# Patient Record
Sex: FEMALE | Race: WHITE | Employment: OTHER | URBAN - METROPOLITAN AREA
[De-identification: names, ages, dates, MRNs, and addresses within clinical notes are randomized per-mention and may not be internally consistent; named-entity substitution may affect disease eponyms.]

---

## 2021-10-15 ENCOUNTER — APPOINTMENT (OUTPATIENT)
Dept: MRI IMAGING | Age: 71
DRG: 064 | End: 2021-10-15
Attending: FAMILY MEDICINE
Payer: MEDICARE

## 2021-10-15 ENCOUNTER — APPOINTMENT (OUTPATIENT)
Dept: CT IMAGING | Age: 71
End: 2021-10-15
Attending: EMERGENCY MEDICINE
Payer: MEDICARE

## 2021-10-15 ENCOUNTER — HOSPITAL ENCOUNTER (EMERGENCY)
Age: 71
Discharge: ANOTHER HOSPITAL | End: 2021-10-15
Attending: EMERGENCY MEDICINE
Payer: MEDICARE

## 2021-10-15 ENCOUNTER — APPOINTMENT (OUTPATIENT)
Dept: CT IMAGING | Age: 71
DRG: 064 | End: 2021-10-15
Attending: EMERGENCY MEDICINE
Payer: MEDICARE

## 2021-10-15 ENCOUNTER — HOSPITAL ENCOUNTER (INPATIENT)
Age: 71
LOS: 10 days | Discharge: NURSING, REHAB OR LONG-TERM CARE | DRG: 064 | End: 2021-10-25
Attending: EMERGENCY MEDICINE | Admitting: FAMILY MEDICINE
Payer: MEDICARE

## 2021-10-15 VITALS
OXYGEN SATURATION: 99 % | RESPIRATION RATE: 17 BRPM | SYSTOLIC BLOOD PRESSURE: 153 MMHG | DIASTOLIC BLOOD PRESSURE: 66 MMHG | BODY MASS INDEX: 41.7 KG/M2 | WEIGHT: 265.65 LBS | TEMPERATURE: 98.2 F | HEIGHT: 67 IN | HEART RATE: 67 BPM

## 2021-10-15 DIAGNOSIS — I63.9 CEREBROVASCULAR ACCIDENT (CVA), UNSPECIFIED MECHANISM (HCC): Primary | ICD-10-CM

## 2021-10-15 DIAGNOSIS — R47.01 APHASIA: ICD-10-CM

## 2021-10-15 DIAGNOSIS — R29.898 WEAKNESS OF RIGHT LOWER EXTREMITY: ICD-10-CM

## 2021-10-15 DIAGNOSIS — M62.81 MUSCLE WEAKNESS OF RIGHT UPPER EXTREMITY: ICD-10-CM

## 2021-10-15 DIAGNOSIS — I63.512 ACUTE STROKE DUE TO OCCLUSION OF LEFT MIDDLE CEREBRAL ARTERY (HCC): Primary | ICD-10-CM

## 2021-10-15 LAB
ALBUMIN SERPL-MCNC: 3.8 G/DL (ref 3.5–5)
ALBUMIN/GLOB SERPL: 1.2 {RATIO} (ref 1.1–2.2)
ALP SERPL-CCNC: 86 U/L (ref 45–117)
ALT SERPL-CCNC: 22 U/L (ref 12–78)
ANION GAP SERPL CALC-SCNC: 3 MMOL/L (ref 5–15)
AST SERPL-CCNC: 17 U/L (ref 15–37)
BASOPHILS # BLD: 0.1 K/UL (ref 0–0.1)
BASOPHILS NFR BLD: 1 % (ref 0–1)
BILIRUB SERPL-MCNC: 0.5 MG/DL (ref 0.2–1)
BUN SERPL-MCNC: 30 MG/DL (ref 6–20)
BUN/CREAT SERPL: 42 (ref 12–20)
CALCIUM SERPL-MCNC: 8.8 MG/DL (ref 8.5–10.1)
CHLORIDE SERPL-SCNC: 104 MMOL/L (ref 97–108)
CK SERPL-CCNC: 43 U/L (ref 26–192)
CO2 SERPL-SCNC: 36 MMOL/L (ref 21–32)
COMMENT, HOLDF: NORMAL
COMMENT, HOLDF: NORMAL
COVID-19 RAPID TEST, COVR: NOT DETECTED
CREAT SERPL-MCNC: 0.72 MG/DL (ref 0.55–1.02)
DIFFERENTIAL METHOD BLD: NORMAL
EOSINOPHIL # BLD: 0.1 K/UL (ref 0–0.4)
EOSINOPHIL NFR BLD: 2 % (ref 0–7)
ERYTHROCYTE [DISTWIDTH] IN BLOOD BY AUTOMATED COUNT: 13.5 % (ref 11.5–14.5)
GLOBULIN SER CALC-MCNC: 3.3 G/DL (ref 2–4)
GLUCOSE BLD STRIP.AUTO-MCNC: 109 MG/DL (ref 65–117)
GLUCOSE BLD STRIP.AUTO-MCNC: 112 MG/DL (ref 65–117)
GLUCOSE SERPL-MCNC: 128 MG/DL (ref 65–100)
HCT VFR BLD AUTO: 40.5 % (ref 35–47)
HGB BLD-MCNC: 13 G/DL (ref 11.5–16)
IMM GRANULOCYTES # BLD AUTO: 0 K/UL (ref 0–0.04)
IMM GRANULOCYTES NFR BLD AUTO: 0 % (ref 0–0.5)
INR PPP: 1.1 (ref 0.9–1.1)
LYMPHOCYTES # BLD: 1.9 K/UL (ref 0.8–3.5)
LYMPHOCYTES NFR BLD: 26 % (ref 12–49)
MCH RBC QN AUTO: 30.6 PG (ref 26–34)
MCHC RBC AUTO-ENTMCNC: 32.1 G/DL (ref 30–36.5)
MCV RBC AUTO: 95.3 FL (ref 80–99)
MONOCYTES # BLD: 0.5 K/UL (ref 0–1)
MONOCYTES NFR BLD: 7 % (ref 5–13)
NEUTS SEG # BLD: 4.7 K/UL (ref 1.8–8)
NEUTS SEG NFR BLD: 64 % (ref 32–75)
NRBC # BLD: 0 K/UL (ref 0–0.01)
NRBC BLD-RTO: 0 PER 100 WBC
PLATELET # BLD AUTO: 270 K/UL (ref 150–400)
PMV BLD AUTO: 9.8 FL (ref 8.9–12.9)
POTASSIUM SERPL-SCNC: 3.7 MMOL/L (ref 3.5–5.1)
PROT SERPL-MCNC: 7.1 G/DL (ref 6.4–8.2)
PROTHROMBIN TIME: 10.6 SEC (ref 9–11.1)
RBC # BLD AUTO: 4.25 M/UL (ref 3.8–5.2)
SAMPLES BEING HELD,HOLD: NORMAL
SAMPLES BEING HELD,HOLD: NORMAL
SERVICE CMNT-IMP: NORMAL
SERVICE CMNT-IMP: NORMAL
SODIUM SERPL-SCNC: 143 MMOL/L (ref 136–145)
SOURCE, COVRS: NORMAL
TROPONIN-HIGH SENSITIVITY: 10 NG/L (ref 0–51)
TROPONIN-HIGH SENSITIVITY: 8 NG/L (ref 0–51)
WBC # BLD AUTO: 7.3 K/UL (ref 3.6–11)

## 2021-10-15 PROCEDURE — 65660000000 HC RM CCU STEPDOWN

## 2021-10-15 PROCEDURE — 80053 COMPREHEN METABOLIC PANEL: CPT

## 2021-10-15 PROCEDURE — 74011250637 HC RX REV CODE- 250/637: Performed by: EMERGENCY MEDICINE

## 2021-10-15 PROCEDURE — 84484 ASSAY OF TROPONIN QUANT: CPT

## 2021-10-15 PROCEDURE — 74011000636 HC RX REV CODE- 636: Performed by: EMERGENCY MEDICINE

## 2021-10-15 PROCEDURE — 36415 COLL VENOUS BLD VENIPUNCTURE: CPT

## 2021-10-15 PROCEDURE — 70450 CT HEAD/BRAIN W/O DYE: CPT

## 2021-10-15 PROCEDURE — 85610 PROTHROMBIN TIME: CPT

## 2021-10-15 PROCEDURE — 82962 GLUCOSE BLOOD TEST: CPT

## 2021-10-15 PROCEDURE — 74011000250 HC RX REV CODE- 250: Performed by: FAMILY MEDICINE

## 2021-10-15 PROCEDURE — 87635 SARS-COV-2 COVID-19 AMP PRB: CPT

## 2021-10-15 PROCEDURE — 4A03X5D MEASUREMENT OF ARTERIAL FLOW, INTRACRANIAL, EXTERNAL APPROACH: ICD-10-PCS | Performed by: RADIOLOGY

## 2021-10-15 PROCEDURE — 93005 ELECTROCARDIOGRAM TRACING: CPT

## 2021-10-15 PROCEDURE — 99285 EMERGENCY DEPT VISIT HI MDM: CPT

## 2021-10-15 PROCEDURE — 74011250636 HC RX REV CODE- 250/636: Performed by: EMERGENCY MEDICINE

## 2021-10-15 PROCEDURE — 70498 CT ANGIOGRAPHY NECK: CPT

## 2021-10-15 PROCEDURE — 0042T CT CODE NEURO PERF W CBF: CPT

## 2021-10-15 PROCEDURE — 85025 COMPLETE CBC W/AUTO DIFF WBC: CPT

## 2021-10-15 PROCEDURE — 74011250636 HC RX REV CODE- 250/636: Performed by: FAMILY MEDICINE

## 2021-10-15 PROCEDURE — 70551 MRI BRAIN STEM W/O DYE: CPT

## 2021-10-15 PROCEDURE — 82550 ASSAY OF CK (CPK): CPT

## 2021-10-15 RX ORDER — CLOPIDOGREL BISULFATE 75 MG/1
75 TABLET ORAL DAILY
Status: DISCONTINUED | OUTPATIENT
Start: 2021-10-16 | End: 2021-10-20

## 2021-10-15 RX ORDER — CHOLECALCIFEROL (VITAMIN D3) 50 MCG
CAPSULE ORAL DAILY
COMMUNITY

## 2021-10-15 RX ORDER — SODIUM CHLORIDE 9 MG/ML
75 INJECTION, SOLUTION INTRAVENOUS CONTINUOUS
Status: DISPENSED | OUTPATIENT
Start: 2021-10-15 | End: 2021-10-16

## 2021-10-15 RX ORDER — ASPIRIN 81 MG/1
81 TABLET ORAL DAILY
Status: DISCONTINUED | OUTPATIENT
Start: 2021-10-16 | End: 2021-10-25 | Stop reason: HOSPADM

## 2021-10-15 RX ORDER — GUAIFENESIN 100 MG/5ML
325 LIQUID (ML) ORAL
Status: COMPLETED | OUTPATIENT
Start: 2021-10-15 | End: 2021-10-15

## 2021-10-15 RX ORDER — MELATONIN
1000 DAILY
COMMUNITY

## 2021-10-15 RX ORDER — ATORVASTATIN CALCIUM 10 MG/1
10 TABLET, FILM COATED ORAL DAILY
Status: DISCONTINUED | OUTPATIENT
Start: 2021-10-16 | End: 2021-10-16

## 2021-10-15 RX ORDER — ASPIRIN 81 MG/1
81 TABLET ORAL DAILY
COMMUNITY

## 2021-10-15 RX ORDER — THERA TABS 400 MCG
1 TAB ORAL DAILY
COMMUNITY

## 2021-10-15 RX ORDER — NAPROXEN 500 MG/1
500 TABLET ORAL 2 TIMES DAILY WITH MEALS
COMMUNITY
End: 2021-10-25

## 2021-10-15 RX ORDER — ACETAMINOPHEN 325 MG/1
650 TABLET ORAL
Status: DISCONTINUED | OUTPATIENT
Start: 2021-10-15 | End: 2021-10-25 | Stop reason: HOSPADM

## 2021-10-15 RX ORDER — HYDROCHLOROTHIAZIDE 25 MG/1
25 TABLET ORAL DAILY
Status: ON HOLD | COMMUNITY
End: 2021-10-25 | Stop reason: SDUPTHER

## 2021-10-15 RX ORDER — ATORVASTATIN CALCIUM 10 MG/1
10 TABLET, FILM COATED ORAL DAILY
Status: ON HOLD | COMMUNITY
End: 2021-10-25 | Stop reason: SDUPTHER

## 2021-10-15 RX ORDER — ACETAMINOPHEN 650 MG/1
650 SUPPOSITORY RECTAL
Status: DISCONTINUED | OUTPATIENT
Start: 2021-10-15 | End: 2021-10-25 | Stop reason: HOSPADM

## 2021-10-15 RX ADMIN — FAMOTIDINE 20 MG: 10 INJECTION, SOLUTION INTRAVENOUS at 22:56

## 2021-10-15 RX ADMIN — IOPAMIDOL 50 ML: 755 INJECTION, SOLUTION INTRAVENOUS at 14:08

## 2021-10-15 RX ADMIN — SODIUM CHLORIDE 75 ML/HR: 9 INJECTION, SOLUTION INTRAVENOUS at 22:57

## 2021-10-15 RX ADMIN — ASPIRIN 81 MG CHEWABLE TABLET 324 MG: 81 TABLET CHEWABLE at 11:40

## 2021-10-15 RX ADMIN — SODIUM CHLORIDE 1000 ML: 9 INJECTION, SOLUTION INTRAVENOUS at 11:40

## 2021-10-15 RX ADMIN — IOPAMIDOL 100 ML: 755 INJECTION, SOLUTION INTRAVENOUS at 10:48

## 2021-10-15 NOTE — H&P
History & Physical    Primary Care Provider: None  Source of Information:     History of Presenting Illness:   Alvin Gavin is a 66 y.o. female who presents with aphasia     Patient presented to [facility] with aphasia. No history can be obtained from the patient, apparently patient went to bed in normal state, woke up today morning with complete aphasia and right-sided weakness, she was unable to speak or stand, came to the ER, was found to have acute CVA and was requested to be admitted to the hospitalist service. No further history could be obtained from the patient or chart review         Review of Systems:  Review of systems not obtained due to patient factors. Aphasia  All other systems reviewed, pertinent positives and negatives noted in HPI    No past medical history on file. No past surgical history on file. Prior to Admission medications    Medication Sig Start Date End Date Taking? Authorizing Provider   atorvastatin (LIPITOR) 10 mg tablet Take 10 mg by mouth daily. Provider, Historical   aspirin delayed-release 81 mg tablet Take 81 mg by mouth daily. Provider, Historical   hydroCHLOROthiazide (HYDRODIURIL) 25 mg tablet Take 25 mg by mouth daily. Provider, Historical   cholecalciferol (VITAMIN D3) (1000 Units /25 mcg) tablet Take 1,000 Units by mouth daily. Provider, Historical   therapeutic multivitamin (THERAGRAN) tablet Take 1 Tablet by mouth daily. Provider, Historical   B.infantis-B.ani-B.long-B.bifi (Probiotic 4X) 10-15 mg TbEC Take  by mouth daily. Provider, Historical   naproxen (NAPROSYN) 500 mg tablet Take 500 mg by mouth two (2) times daily (with meals). Provider, Historical     No Known Allergies   No family history on file.    Family history was discussed with the patient, all pertinent and relevant details are mentioned as above, no other pertinent and relevant family history was noted on my discussion with the patient. Patient specifically denies any history of Gaucher disease in the family  SOCIAL HISTORY:  Patient resides:  Independently    Assisted Living    SNF    With family care       Smoking history:   None x   Former    Chronic      Alcohol history:   None x   Social    Chronic      Ambulates:   Independently x   w/cane    w/walker    w/wc    CODE STATUS:  DNR    Full x   Other      Objective:     Physical Exam:     Visit Vitals  BP (!) 118/94   Pulse 69   Temp 98.8 °F (37.1 °C)   Resp 15   Wt 120.5 kg (265 lb 10.5 oz)   SpO2 95%   BMI 41.61 kg/m²      O2 Device: None (Room air)    General : Awake, aphasic  HEENT: PEERL, moist mucus membrane, TM clear  Neck: supple, no JVD, no meningeal signs  Chest: Clear to auscultation bilaterally   CVS: S1 S2 heard, Capillary refill less than 2 seconds  Abd: soft/ Non tender, non distended, BS physiological,   Ext: no clubbing, no cyanosis, no edema, brisk 2+ DP pulses  Neuro/Psych: Aphasic, 0/5 strength right upper extremity, 1/5 strength right lower extremity, cranial S cannot be tested, DTR 1+x4  Skin: warm     EKG: Normal sinus rhythm with nonspecific ST changes    Data Review:     Recent Days:  Recent Labs     10/15/21  1035   WBC 7.3   HGB 13.0   HCT 40.5        Recent Labs     10/15/21  1035      K 3.7      CO2 36*   *   BUN 30*   CREA 0.72   CA 8.8   ALB 3.8   ALT 22   INR 1.1     No results for input(s): PH, PCO2, PO2, HCO3, FIO2 in the last 72 hours.     24 Hour Results:  Recent Results (from the past 24 hour(s))   GLUCOSE, POC    Collection Time: 10/15/21 10:30 AM   Result Value Ref Range    Glucose (POC) 112 65 - 117 mg/dL    Performed by Elvie Collet    CBC WITH AUTOMATED DIFF    Collection Time: 10/15/21 10:35 AM   Result Value Ref Range    WBC 7.3 3.6 - 11.0 K/uL    RBC 4.25 3.80 - 5.20 M/uL    HGB 13.0 11.5 - 16.0 g/dL    HCT 40.5 35.0 - 47.0 %    MCV 95.3 80.0 - 99.0 FL    MCH 30.6 26.0 - 34.0 PG    MCHC 32.1 30.0 - 36.5 g/dL    RDW 13.5 11.5 - 14.5 %    PLATELET 493 902 - 630 K/uL    MPV 9.8 8.9 - 12.9 FL    NRBC 0.0 0  WBC    ABSOLUTE NRBC 0.00 0.00 - 0.01 K/uL    NEUTROPHILS 64 32 - 75 %    LYMPHOCYTES 26 12 - 49 %    MONOCYTES 7 5 - 13 %    EOSINOPHILS 2 0 - 7 %    BASOPHILS 1 0 - 1 %    IMMATURE GRANULOCYTES 0 0.0 - 0.5 %    ABS. NEUTROPHILS 4.7 1.8 - 8.0 K/UL    ABS. LYMPHOCYTES 1.9 0.8 - 3.5 K/UL    ABS. MONOCYTES 0.5 0.0 - 1.0 K/UL    ABS. EOSINOPHILS 0.1 0.0 - 0.4 K/UL    ABS. BASOPHILS 0.1 0.0 - 0.1 K/UL    ABS. IMM. GRANS. 0.0 0.00 - 0.04 K/UL    DF AUTOMATED     METABOLIC PANEL, COMPREHENSIVE    Collection Time: 10/15/21 10:35 AM   Result Value Ref Range    Sodium 143 136 - 145 mmol/L    Potassium 3.7 3.5 - 5.1 mmol/L    Chloride 104 97 - 108 mmol/L    CO2 36 (H) 21 - 32 mmol/L    Anion gap 3 (L) 5 - 15 mmol/L    Glucose 128 (H) 65 - 100 mg/dL    BUN 30 (H) 6 - 20 MG/DL    Creatinine 0.72 0.55 - 1.02 MG/DL    BUN/Creatinine ratio 42 (H) 12 - 20      GFR est AA >60 >60 ml/min/1.73m2    GFR est non-AA >60 >60 ml/min/1.73m2    Calcium 8.8 8.5 - 10.1 MG/DL    Bilirubin, total 0.5 0.2 - 1.0 MG/DL    ALT (SGPT) 22 12 - 78 U/L    AST (SGOT) 17 15 - 37 U/L    Alk. phosphatase 86 45 - 117 U/L    Protein, total 7.1 6.4 - 8.2 g/dL    Albumin 3.8 3.5 - 5.0 g/dL    Globulin 3.3 2.0 - 4.0 g/dL    A-G Ratio 1.2 1.1 - 2.2     PROTHROMBIN TIME + INR    Collection Time: 10/15/21 10:35 AM   Result Value Ref Range    INR 1.1 0.9 - 1.1      Prothrombin time 10.6 9.0 - 11.1 sec   SAMPLES BEING HELD    Collection Time: 10/15/21 10:35 AM   Result Value Ref Range    SAMPLES BEING HELD 1SST, 1RED     COMMENT        Add-on orders for these samples will be processed based on acceptable specimen integrity and analyte stability, which may vary by analyte.    TROPONIN-HIGH SENSITIVITY    Collection Time: 10/15/21 10:35 AM   Result Value Ref Range    Troponin-High Sensitivity 10 0 - 51 ng/L   EKG, 12 LEAD, INITIAL    Collection Time: 10/15/21 11:43 AM   Result Value Ref Range    Ventricular Rate 66 BPM    Atrial Rate 66 BPM    P-R Interval 168 ms    QRS Duration 90 ms    Q-T Interval 436 ms    QTC Calculation (Bezet) 457 ms    Calculated P Axis 77 degrees    Calculated R Axis -26 degrees    Calculated T Axis 41 degrees    Diagnosis       Normal sinus rhythm  Normal ECG  No previous ECGs available     COVID-19 RAPID TEST    Collection Time: 10/15/21  1:45 PM   Result Value Ref Range    Specimen source Nasopharyngeal      COVID-19 rapid test Not detected NOTD     SAMPLES BEING HELD    Collection Time: 10/15/21  2:02 PM   Result Value Ref Range    SAMPLES BEING HELD 1RED,1BLU,1PST,1LAV     COMMENT        Add-on orders for these samples will be processed based on acceptable specimen integrity and analyte stability, which may vary by analyte. Imaging:         CTA CODE NEURO HEAD AND NECK W CONT    Addendum Date: 10/15/2021    Addendum: Review of the images demonstrates possible occlusion of the anterior division of the left anterior cerebral artery, with a relative paucity of vessels in the left frontal lobe anterior middle cerebral artery territory.:     Result Date: 10/15/2021  1. No acute large vessel occlusion or flow-limiting stenosis. CT CODE NEURO HEAD WO CONTRAST    Result Date: 10/15/2021  Findings compatible with acute left MCA infarct. No evidence for parenchymal hemorrhage at this time. This result was verbally relayed by me at 1341 hours to [place], [address] [facility]    Result Date: 10/15/2021  Subtle decreased attenuation left anterior MCA territory is concerning for acute ischemia. No hemorrhage is identified. The findings were called to Dr. Claudetta Byers on 10/15/2021 at 10:53 AM by myself. 1     CT CODE NEURO PERF W CBF    Result Date: 10/15/2021  Ischemia in the left middle cerebral artery territory in the left frontal lobe. 20 cc mismatch volume. Mismatch ratio 1.8.     Assessment/Plan     Acute CVA      Patient will be admitted on a neuro telemetry bed, NIS assessed the patient, no acute NIS intervention, start patient on DAPT, PT OT speech consult, MRI of the brain, neurology consult, neurovascular checks, echocardiogram, any changes in neurological status will mandate emergent intervention, continue monitor, permissive hypertension reassess as needed      GI DVT prophylaxis: Patient will be on SCD           Please note that this dictation was completed with Intentio, the computer voice recognition software. Quite often unanticipated grammatical, syntax, homophones, and other interpretive errors are inadvertently transcribed by the computer software. Please disregard these errors. Please excuse any errors that have escaped final proofreading.          Signed By: Rinku Harrison MD     October 15, 2021

## 2021-10-15 NOTE — ED TRIAGE NOTES
Pt is a transfer from [address] [place] [address]. Per family report, the pt was last seen normal at 2200 on 10/14/21. This morning she woke up with right sided weakness and complete aphasia. She was able to shower unassisted, but family found her unable to speak or stand, no hx of stroke. Pt had an NIH of 12.

## 2021-10-15 NOTE — ED NOTES
TRANSFER - OUT REPORT:    Verbal report given to CAROL Mccall RN on Tien Brooke  being transferred to [facility] ED for urgent transfer       Report consisted of patients Situation, Background, Assessment and   Recommendations(SBAR). Information from the following report(s) SBAR, Kardex, ED Summary, MAR, Recent Results and Med Rec Status was reviewed with the receiving nurse. Lines:   Peripheral IV 10/15/21 Left Hand (Active)   Site Assessment Clean, dry, & intact 10/15/21 1110   Phlebitis Assessment 0 10/15/21 1110   Infiltration Assessment 0 10/15/21 1110   Dressing Status Clean, dry, & intact 10/15/21 1110        Opportunity for questions and clarification was provided.       Patient transported with:  [employer]

## 2021-10-15 NOTE — ED NOTES
TRANSFER - OUT REPORT:    Verbal report given to Omar Pham RN(name) on Shon Quiroz  being transferred to (unit) for routine progression of care       Report consisted of patients Situation, Background, Assessment and   Recommendations(SBAR). Information from the following report(s) SBAR was reviewed with the receiving nurse. Lines:   Peripheral IV 10/15/21 Right Antecubital (Active)   Site Assessment Clean, dry, & intact 10/15/21 1402   Phlebitis Assessment 0 10/15/21 1402   Infiltration Assessment 0 10/15/21 1402   Dressing Status Clean, dry, & intact 10/15/21 1402   Dressing Type Transparent 10/15/21 1402   Hub Color/Line Status Patent; Flushed 10/15/21 1402        Opportunity for questions and clarification was provided.       Patient transported with:   [employer]

## 2021-10-15 NOTE — ADVANCED PRACTICE NURSE
Neurocritical Care Code Stroke Documentation      Symptoms:   Right sided hemiplegia, expressive aphasia, right facial droop    Last Known Well:  This morning sometime before 10 AM, report is unclear    Medical hx: Active Problems:    * No active hospital problems. *     Anticoagulation:  none   VAN:   Positive   NIHSS:   1a-LOC: 0    1b-Month/Age:2    1c-Open/Close Hand:1    2-Best Gaze:0    3-Visual Fields:0    4-Facial Palsy:1    5a-Left Arm:0    5b-Right Arm:4    6a-Left Le    6b-Right Le    7-Limb Ataxia:0    8-Sensory:0    9-Best Language:0    10-Dysarthria:2    11-Extinction/Inattention:0  TOTAL SCORE: 11   Imaging:   CT- IMPRESSION  Findings compatible with acute left MCA infarct. No evidence for parenchymal hemorrhage at this time. CTA    CTP   Plan:   TPA Candidate:NO    Mechanical thrombectomy Candidate: NO     Discussed with: Dr Roly Wilkins, Dr. Lindle Meckel, ED RN,     Patient arrived with known left M2 occlusion and right sided hemiplegia with expressive aphasia ( mute). Patient arrives via helicopter and is taken immediately for repeat head CT and CTP. The patient unfortunately has completed her stroke and is not a candidate for mechanical thrombectomy. There is not enough ischemic penumbra to justify the procedure at this point. Attempted to call the patients , Stacie Brown with no answer. Message left to return call for update. Arrival time: 0125  Time spent:  minutes.      Damaso Alfred NP  Neurocritical Care Nurse Practitioner  [phone]

## 2021-10-15 NOTE — ED PROVIDER NOTES
[address]  EMERGENCY DEPARTMENT HISTORY AND PHYSICAL EXAM         Date of Service: 10/15/2021   Patient Name: Augie Mosher   YOB: 1950  Medical Record Number: [id number]    History of Presenting Illness     Chief Complaint   Patient presents with    Extremity Weakness        History Provided By:  EMS    Additional History:   Augie Mosher is a 66 y.o. female who presents via EMS to the ED with cc of probable CVA. Per family, pt was normal at bedtime last night. This morning, she awoke with right side weakness and complete aphasia. She was able to take a shower unassisted, but family found her unable to speak or stand. She has no prior H/O strokes. No other history available. There are no other complaints, changes or physical findings at this time. Primary Care Provider: No primary care provider on file. Specialist:    Past History     Past Medical History:   History reviewed. No pertinent past medical history. Past Surgical History:   No past surgical history on file. Family History:   History reviewed. No pertinent family history. Social History:   Social History     Tobacco Use    Smoking status: Not on file   Substance Use Topics    Alcohol use: Not on file    Drug use: Not on file        Allergies:   No Known Allergies     Review of Systems   Review of Systems   Unable to perform ROS: Patient nonverbal       Physical Exam  Physical Exam  Vitals and nursing note reviewed. Constitutional:       General: She is not in acute distress. Appearance: She is well-developed. She is obese. HENT:      Head: Normocephalic and atraumatic. Eyes:      General: No scleral icterus. Conjunctiva/sclera: Conjunctivae normal.      Pupils: Pupils are equal, round, and reactive to light. Cardiovascular:      Rate and Rhythm: Normal rate and regular rhythm. Heart sounds: No murmur heard. No gallop.     Pulmonary:      Effort: Pulmonary effort is normal. No respiratory distress. Breath sounds: No stridor. No wheezing or rales. Abdominal:      General: Bowel sounds are normal. There is no distension. Palpations: Abdomen is soft. There is no mass. Tenderness: There is no abdominal tenderness. There is no guarding or rebound. Musculoskeletal:         General: Normal range of motion. Cervical back: Normal range of motion and neck supple. Lymphadenopathy:      Cervical: No cervical adenopathy. Skin:     General: Skin is warm and dry. Findings: No erythema or rash. Neurological:      Mental Status: She is alert. Cranial Nerves: Cranial nerve deficit present. Sensory: No sensory deficit. Motor: Weakness present. Coordination: Coordination abnormal.      Comments: Aphasic. 3/5 strength right upper and lower extremities, right facial droop. Medical Decision Making   I am the first provider for this patient. I reviewed the vital signs, available nursing notes, past medical history, past surgical history, family history and social history. Old Medical Records: EMS report     Provider Notes:   DDX: CVA, LVO, metabolic abnormality. ED Course:  10:27 AM   Initial assessment performed. The patients presenting problems have been discussed, and they are in agreement with the care plan formulated and outlined with them. I have encouraged them to ask questions as they arise throughout their visit. Progress Notes:  11:03 AM  Pt seen by Dr. Sammi Stewart, Neurology. Radiology sees a hypodensity C/W left MCA infarct. Await CTA. Valerie Bullock MD     11:23 AM  Dr. Sammi Stewart spoke to Dr. Riccardo Barth at [facility]; requests emergent transfer there for possible intervention. Valerie Bullock MD       11:25 AM  Valerie Bullock MD spoke with Dr. Jeff Gonzalez, Consult for [facility] ED. Discussed available diagnostic tests and clinical findings. He is in agreement with care plans as outlined.  He/she accepts transfer. CRITICAL CARE NOTE:  IMPENDING DETERIORATION -CNS  ASSOCIATED RISK FACTORS - Hypotension and CNS Decompensation  MANAGEMENT- Bedside Assessment  INTERPRETATION -  CT Scan, ECG, Blood Pressure and Cardiac Output Measures   INTERVENTIONS - Neurologic interventions   CASE REVIEW - Medical Sub-Specialist and Nursing  TREATMENT RESPONSE -Stable  PERFORMED BY - Self  NOTES:  I have spent 45 minutes of critical care time involved in lab review, consultations with specialist, family decision- making, bedside attention and documentation. During this entire length of time I was immediately available to the patient. Sunday Addison MD            Procedures:   Procedures    Diagnostic Study Results   Labs -      Recent Results (from the past 12 hour(s))   GLUCOSE, POC    Collection Time: 10/15/21 10:30 AM   Result Value Ref Range    Glucose (POC) 112 65 - 117 mg/dL    Performed by Frances Riggins    CBC WITH AUTOMATED DIFF    Collection Time: 10/15/21 10:35 AM   Result Value Ref Range    WBC 7.3 3.6 - 11.0 K/uL    RBC 4.25 3.80 - 5.20 M/uL    HGB 13.0 11.5 - 16.0 g/dL    HCT 40.5 35.0 - 47.0 %    MCV 95.3 80.0 - 99.0 FL    MCH 30.6 26.0 - 34.0 PG    MCHC 32.1 30.0 - 36.5 g/dL    RDW 13.5 11.5 - 14.5 %    PLATELET 108 071 - 527 K/uL    MPV 9.8 8.9 - 12.9 FL    NRBC 0.0 0  WBC    ABSOLUTE NRBC 0.00 0.00 - 0.01 K/uL    NEUTROPHILS 64 32 - 75 %    LYMPHOCYTES 26 12 - 49 %    MONOCYTES 7 5 - 13 %    EOSINOPHILS 2 0 - 7 %    BASOPHILS 1 0 - 1 %    IMMATURE GRANULOCYTES 0 0.0 - 0.5 %    ABS. NEUTROPHILS 4.7 1.8 - 8.0 K/UL    ABS. LYMPHOCYTES 1.9 0.8 - 3.5 K/UL    ABS. MONOCYTES 0.5 0.0 - 1.0 K/UL    ABS. EOSINOPHILS 0.1 0.0 - 0.4 K/UL    ABS. BASOPHILS 0.1 0.0 - 0.1 K/UL    ABS. IMM.  GRANS. 0.0 0.00 - 0.04 K/UL    DF AUTOMATED     METABOLIC PANEL, COMPREHENSIVE    Collection Time: 10/15/21 10:35 AM   Result Value Ref Range    Sodium 143 136 - 145 mmol/L    Potassium 3.7 3.5 - 5.1 mmol/L    Chloride 104 97 - 108 mmol/L    CO2 36 (H) 21 - 32 mmol/L    Anion gap 3 (L) 5 - 15 mmol/L    Glucose 128 (H) 65 - 100 mg/dL    BUN 30 (H) 6 - 20 MG/DL    Creatinine 0.72 0.55 - 1.02 MG/DL    BUN/Creatinine ratio 42 (H) 12 - 20      GFR est AA >60 >60 ml/min/1.73m2    GFR est non-AA >60 >60 ml/min/1.73m2    Calcium 8.8 8.5 - 10.1 MG/DL    Bilirubin, total 0.5 0.2 - 1.0 MG/DL    ALT (SGPT) 22 12 - 78 U/L    AST (SGOT) 17 15 - 37 U/L    Alk. phosphatase 86 45 - 117 U/L    Protein, total 7.1 6.4 - 8.2 g/dL    Albumin 3.8 3.5 - 5.0 g/dL    Globulin 3.3 2.0 - 4.0 g/dL    A-G Ratio 1.2 1.1 - 2.2     PROTHROMBIN TIME + INR    Collection Time: 10/15/21 10:35 AM   Result Value Ref Range    INR 1.1 0.9 - 1.1      Prothrombin time 10.6 9.0 - 11.1 sec   SAMPLES BEING HELD    Collection Time: 10/15/21 10:35 AM   Result Value Ref Range    SAMPLES BEING HELD 1SST, 1RED     COMMENT        Add-on orders for these samples will be processed based on acceptable specimen integrity and analyte stability, which may vary by analyte. TROPONIN-HIGH SENSITIVITY    Collection Time: 10/15/21 10:35 AM   Result Value Ref Range    Troponin-High Sensitivity 10 0 - 51 ng/L       Radiologic Studies -  The following have been ordered and reviewed:  CTA CODE NEURO HEAD AND NECK W CONT   Final Result      1. No acute large vessel occlusion or flow-limiting stenosis. CT CODE NEURO HEAD WO CONTRAST   Final Result   Subtle decreased attenuation left anterior MCA territory is concerning for acute   ischemia. No hemorrhage is identified. The findings were called to Dr. Kate Dickey on 10/15/2021 at 10:53 AM by myself. [phone]              CT Results  (Last 48 hours)               10/15/21 1047  CTA CODE NEURO HEAD AND NECK W CONT Final result    Impression:      1. No acute large vessel occlusion or flow-limiting stenosis.            Narrative:  EXAM:  CTA CODE NEURO HEAD AND NECK W CONT       INDICATION:  Code Stroke       COMPARISON:  Noncontrast head CT of earlier in the day       TECHNIQUE:     Multislice helical axial CT angiography was performed from the aortic arch to   the top of the head during uneventful rapid bolus intravenous administration of   100 mL of Isovue 370. Postprocessing was performed to include MIP and   reformatted images. Standard images of the head were also obtained following   contrast administration and a delay. CT dose reduction was achieved through the use of a standardized protocol   tailored for this examination and automatic exposure control for dose   modulation. This study was analyzed by the [address] ai algorithm. FINDINGS:       HEAD CT:   The ventricles are normal in size and midline. .  There is no intracranial   hemorrhage or evolving infarct. . There is no abnormal enhancement. . .       CTA NECK:       Great vessels: Normal arch anatomy with the origins patent. Right subclavian artery: Patent       Left subclavian artery: Patent        Right common carotid artery: Patent        Left common carotid artery: Patent        Cervical right internal carotid artery: Patent with no significant stenosis by   NASCET criteria. Cervical left internal carotid artery: Patent with no significant stenosis by   NASCET criteria. Focal calcified plaque with less than 25% stenosis. Right vertebral artery: Patent       Left vertebral artery: Patent       No thyroid nodule. Lung apices clear. No cervical adenopathy. Degenerative   changes in the cervical spine. CTA HEAD:       Right cavernous internal carotid artery: Patent       Left cavernous internal carotid artery: Patent       Anterior cerebral arteries: Patent. Hypoplastic absent right A1 segment with   supply of the right A2 segment via the anterior communicating artery. Anterior communicating artery: Patent       Right middle cerebral artery: Patent       Left middle cerebral artery: Patent       Posterior cerebral arteries: Patent.  Fetal origin of the right posterior   cerebral artery. Absent right P1 segment. Patent left posterior cerebral artery. Posterior communicating arteries: No significant left posterior communicating   artery. Basilar artery: Patent       Distal vertebral arteries: Patent       No aneurysm or vascular malformation. Patency of the major venous sinuses and   deep venous system. 10/15/21 1032  CT CODE NEURO HEAD WO CONTRAST Final result    Impression:  Subtle decreased attenuation left anterior MCA territory is concerning for acute   ischemia. No hemorrhage is identified. The findings were called to Dr. Patric Barclay on 10/15/2021 at 10:53 AM by myself. 789               Narrative:  EXAM: CT CODE NEURO HEAD WO CONTRAST       INDICATION: Acute right-sided weakness, complete aphasia       COMPARISON: None. CONTRAST: None. TECHNIQUE: Unenhanced CT of the head was performed using 5 mm images. Brain and   bone windows were generated. Coronal and sagittal reformats. CT dose reduction   was achieved through use of a standardized protocol tailored for this   examination and automatic exposure control for dose modulation. FINDINGS:   The ventricles and sulci are normal in size, shape and configuration. . There is   no significant white matter disease. There is no intracranial hemorrhage,   extra-axial collection, or mass effect. The basilar cisterns are open. There   appears to be subtle decreased attenuation in the left anterior MCA territory   without hemorrhage. The bone windows demonstrate no abnormalities. The visualized portions of the   paranasal sinuses and mastoid air cells are clear. CXR Results  (Last 48 hours)    None            Vital Signs-Reviewed the patient's vital signs.    Patient Vitals for the past 12 hrs:   Temp Pulse Resp BP SpO2   10/15/21 1120  67 19 (!) 170/81 97 %   10/15/21 1113  70 17 (!) 180/91 97 %   10/15/21 1034 98.2 °F (36.8 °C) 72 21 132/67 96 % Medications Given in the ED:  Medications   sodium chloride 0.9 % bolus infusion 1,000 mL (has no administration in time range)   aspirin chewable tablet 324 mg (has no administration in time range)   iopamidoL (ISOVUE-370) 76 % injection 100 mL (100 mL IntraVENous Given 10/15/21 1048)       Diagnosis:  Clinical Impression:   1. Acute stroke due to occlusion of left middle cerebral artery ([address]         Disposition:  Transfer to [facility] ED  _______________________________   Attestations: This note was performed by Holland Hoffmann MD in its entirety.   _______________________________

## 2021-10-15 NOTE — ED PROVIDER NOTES
HPI       74y F here as transfer from [facility] for LVO CVA. Apparently woke up this AM with aphasia. Noted to have occlusion of M2 on L side on CTA. [facility] spoke with neurointerventional rads here who wanted her sent for possible intervention. Pt not able to provide further history at this time due to aphasia. No past medical history on file. No past surgical history on file. No family history on file. Social History     Socioeconomic History    Marital status:      Spouse name: Not on file    Number of children: Not on file    Years of education: Not on file    Highest education level: Not on file   Occupational History    Not on file   Tobacco Use    Smoking status: Not on file   Substance and Sexual Activity    Alcohol use: Not on file    Drug use: Not on file    Sexual activity: Not on file   Other Topics Concern    Not on file   Social History Narrative    Not on file     Social Determinants of Health     Financial Resource Strain:     Difficulty of Paying Living Expenses:    Food Insecurity:     Worried About Running Out of Food in the Last Year:     [address] in the Last Year:    Transportation Needs:     Lack of Transportation (Medical):  Lack of Transportation (Non-Medical):    Physical Activity:     Days of Exercise per Week:     Minutes of Exercise per Session:    Stress:     Feeling of Stress :    Social Connections:     Frequency of Communication with Friends and Family:     Frequency of Social Gatherings with Friends and Family:     Attends [employer] Services:     Active Member of Clubs or Organizations:     Attends Club or Organization Meetings:     Marital Status:    Intimate Partner Violence:     Fear of Current or Ex-Partner:     Emotionally Abused:     Physically Abused:     Sexually Abused: ALLERGIES: Patient has no known allergies. Review of Systems   See hpi, otherwise not able to obtain due to aphasia.     There were no vitals filed for this visit. Physical Exam Nursing note and vitals reviewed. Constitutional: awake and alert. appears well-developed and well-nourished. No distress. Head: Normocephalic and atraumatic. Sclera anicteric  Nose: No rhinorrhea  Mouth/Throat: Oropharynx is clear and moist. Pharynx normal  Eyes: Conjunctivae are normal. Pupils are equal, round, and reactive to light. Right eye exhibits no discharge. Left eye exhibits no discharge. Neck: Painless normal range of motion. Neck supple. No LAD. Cardiovascular: Normal rate, regular rhythm, normal heart sounds and intact distal pulses. Exam reveals no gallop and no friction rub. No murmur heard. Pulmonary/Chest:  No respiratory distress. No wheezes. No rales. No rhonchi. No increased work of breathing. No accessory muscle use. Good air exchange throughout. Abdominal: soft, non-tender, no rebound or guarding. No hepatosplenomegaly. Normal bowel sounds throughout. Back: no deformities, no CVA tenderness  Extremities/Musculoskeletal: Normal range of motion. no tenderness. No edema. Distal extremities are neurovasc intact. Lymphadenopathy:   No adenopathy. Neurological:  Aphasic. Does not move RUE. Minimal movement of RLE. Nods \"yes\" and \"no\" appropriately to questions. Skin: Skin is warm and dry. No rash noted. No pallor. MDM 74y F here as transfer for LVO CVA. Right to CT on arrival for CT head and CT perfusion. Neurointerventional at bedside on pt arrival as well. Procedures    2:06 PM  No intervention planned by neuroIR. Will admit to hospitalist.     [address] [facility] for Admission  2:07 PM    ED Room Number: ER30/30  Patient Name and age:  Chito Burton 66 y.o.  female  Working Diagnosis:   1. Cerebrovascular accident (CVA), unspecified mechanism ([facility] [address]    2. Aphasia    3. Muscle weakness of right upper extremity    4.  Weakness of right lower extremity        COVID-19 Suspicion:  no  Sepsis present:  no  Reassessment needed: N/A  Code Status:  Full Code  Readmission: no  Isolation Requirements:  no  Recommended Level of Care:  step down  Department:[facility] Adult ED - 21   Other:  74y F here as transfer from [facility] with LVO CVA. Apparently woke up with aphasia and R sided weakness. CTA showed M2 occlusion on L side. Repeat perfusion study here showed stoke had completed and no intervention planned by IR.

## 2021-10-15 NOTE — ROUTINE PROCESS
1830 TRANSFER - IN REPORT:    Verbal report received from [facility]) on Angel Barillas  being received from ED(unit) for routine progression of care      Report consisted of patients Situation, Background, Assessment and   Recommendations(SBAR). Information from the following report(s) SBAR, Kardex, ED Summary, Procedure Summary, Intake/Output, MAR, Accordion, Recent Results, Cardiac Rhythm NSR, Quality Measures and Dual Neuro Assessment was reviewed with the receiving nurse. Opportunity for questions and clarification was provided. Assessment completed upon patients arrival to unit and care assumed.   _____________________________________________________    3201 Patient remains in MRI department. Verbal shift change report given to [id number] (oncoming nurse) by Mer George RN (offgoing nurse). Report included the following information SBAR, Kardex, ED Summary, Procedure Summary, Intake/Output, MAR, Recent Results and Cardiac Rhythm NSR.

## 2021-10-16 LAB
ANION GAP SERPL CALC-SCNC: 6 MMOL/L (ref 5–15)
BUN SERPL-MCNC: 20 MG/DL (ref 6–20)
BUN/CREAT SERPL: 38 (ref 12–20)
CALCIUM SERPL-MCNC: 8.7 MG/DL (ref 8.5–10.1)
CHLORIDE SERPL-SCNC: 108 MMOL/L (ref 97–108)
CHOLEST SERPL-MCNC: 162 MG/DL
CK SERPL-CCNC: 43 U/L (ref 26–192)
CO2 SERPL-SCNC: 26 MMOL/L (ref 21–32)
CREAT SERPL-MCNC: 0.52 MG/DL (ref 0.55–1.02)
CRP SERPL HS-MCNC: 5.5 MG/L
ERYTHROCYTE [DISTWIDTH] IN BLOOD BY AUTOMATED COUNT: 13.4 % (ref 11.5–14.5)
ERYTHROCYTE [SEDIMENTATION RATE] IN BLOOD: 21 MM/HR (ref 0–30)
EST. AVERAGE GLUCOSE BLD GHB EST-MCNC: 120 MG/DL
GLUCOSE BLD STRIP.AUTO-MCNC: 110 MG/DL (ref 65–117)
GLUCOSE BLD STRIP.AUTO-MCNC: 119 MG/DL (ref 65–117)
GLUCOSE BLD STRIP.AUTO-MCNC: 127 MG/DL (ref 65–117)
GLUCOSE BLD STRIP.AUTO-MCNC: 97 MG/DL (ref 65–117)
GLUCOSE SERPL-MCNC: 125 MG/DL (ref 65–100)
HBA1C MFR BLD: 5.8 % (ref 4–5.6)
HCT VFR BLD AUTO: 39.2 % (ref 35–47)
HDLC SERPL-MCNC: 39 MG/DL
HDLC SERPL: 4.2 {RATIO} (ref 0–5)
HGB BLD-MCNC: 12.6 G/DL (ref 11.5–16)
LDLC SERPL CALC-MCNC: 102.6 MG/DL (ref 0–100)
MAGNESIUM SERPL-MCNC: 2.1 MG/DL (ref 1.6–2.4)
MCH RBC QN AUTO: 31.2 PG (ref 26–34)
MCHC RBC AUTO-ENTMCNC: 32.1 G/DL (ref 30–36.5)
MCV RBC AUTO: 97 FL (ref 80–99)
NRBC # BLD: 0 K/UL (ref 0–0.01)
NRBC BLD-RTO: 0 PER 100 WBC
PHOSPHATE SERPL-MCNC: 3 MG/DL (ref 2.6–4.7)
PLATELET # BLD AUTO: 272 K/UL (ref 150–400)
PMV BLD AUTO: 9.9 FL (ref 8.9–12.9)
POTASSIUM SERPL-SCNC: 3.4 MMOL/L (ref 3.5–5.1)
RBC # BLD AUTO: 4.04 M/UL (ref 3.8–5.2)
SERVICE CMNT-IMP: ABNORMAL
SERVICE CMNT-IMP: ABNORMAL
SERVICE CMNT-IMP: NORMAL
SERVICE CMNT-IMP: NORMAL
SODIUM SERPL-SCNC: 140 MMOL/L (ref 136–145)
TRIGL SERPL-MCNC: 102 MG/DL (ref ?–150)
TROPONIN-HIGH SENSITIVITY: 7 NG/L (ref 0–51)
TSH SERPL DL<=0.05 MIU/L-ACNC: 1.64 UIU/ML (ref 0.36–3.74)
VLDLC SERPL CALC-MCNC: 20.4 MG/DL
WBC # BLD AUTO: 9.9 K/UL (ref 3.6–11)

## 2021-10-16 PROCEDURE — 80061 LIPID PANEL: CPT

## 2021-10-16 PROCEDURE — 97161 PT EVAL LOW COMPLEX 20 MIN: CPT

## 2021-10-16 PROCEDURE — 97530 THERAPEUTIC ACTIVITIES: CPT

## 2021-10-16 PROCEDURE — 84100 ASSAY OF PHOSPHORUS: CPT

## 2021-10-16 PROCEDURE — 83036 HEMOGLOBIN GLYCOSYLATED A1C: CPT

## 2021-10-16 PROCEDURE — 99223 1ST HOSP IP/OBS HIGH 75: CPT | Performed by: PSYCHIATRY & NEUROLOGY

## 2021-10-16 PROCEDURE — 74011000250 HC RX REV CODE- 250: Performed by: FAMILY MEDICINE

## 2021-10-16 PROCEDURE — 36600 WITHDRAWAL OF ARTERIAL BLOOD: CPT

## 2021-10-16 PROCEDURE — 74011250636 HC RX REV CODE- 250/636: Performed by: FAMILY MEDICINE

## 2021-10-16 PROCEDURE — 82550 ASSAY OF CK (CPK): CPT

## 2021-10-16 PROCEDURE — 97165 OT EVAL LOW COMPLEX 30 MIN: CPT

## 2021-10-16 PROCEDURE — 85027 COMPLETE CBC AUTOMATED: CPT

## 2021-10-16 PROCEDURE — 74011250637 HC RX REV CODE- 250/637: Performed by: INTERNAL MEDICINE

## 2021-10-16 PROCEDURE — 82962 GLUCOSE BLOOD TEST: CPT

## 2021-10-16 PROCEDURE — 65660000000 HC RM CCU STEPDOWN

## 2021-10-16 PROCEDURE — 80048 BASIC METABOLIC PNL TOTAL CA: CPT

## 2021-10-16 PROCEDURE — 97112 NEUROMUSCULAR REEDUCATION: CPT

## 2021-10-16 PROCEDURE — 36415 COLL VENOUS BLD VENIPUNCTURE: CPT

## 2021-10-16 PROCEDURE — 86141 C-REACTIVE PROTEIN HS: CPT

## 2021-10-16 PROCEDURE — 85652 RBC SED RATE AUTOMATED: CPT

## 2021-10-16 PROCEDURE — 84484 ASSAY OF TROPONIN QUANT: CPT

## 2021-10-16 PROCEDURE — 84443 ASSAY THYROID STIM HORMONE: CPT

## 2021-10-16 PROCEDURE — 83735 ASSAY OF MAGNESIUM: CPT

## 2021-10-16 PROCEDURE — 74011250637 HC RX REV CODE- 250/637: Performed by: FAMILY MEDICINE

## 2021-10-16 RX ORDER — LISINOPRIL 10 MG/1
10 TABLET ORAL DAILY
Status: DISCONTINUED | OUTPATIENT
Start: 2021-10-17 | End: 2021-10-21

## 2021-10-16 RX ORDER — POTASSIUM CHLORIDE 750 MG/1
10 TABLET, FILM COATED, EXTENDED RELEASE ORAL
Status: COMPLETED | OUTPATIENT
Start: 2021-10-16 | End: 2021-10-16

## 2021-10-16 RX ORDER — ATORVASTATIN CALCIUM 40 MG/1
40 TABLET, FILM COATED ORAL DAILY
Status: DISCONTINUED | OUTPATIENT
Start: 2021-10-17 | End: 2021-10-25 | Stop reason: HOSPADM

## 2021-10-16 RX ADMIN — FAMOTIDINE 20 MG: 10 INJECTION, SOLUTION INTRAVENOUS at 09:26

## 2021-10-16 RX ADMIN — ATORVASTATIN CALCIUM 10 MG: 10 TABLET, FILM COATED ORAL at 09:26

## 2021-10-16 RX ADMIN — ACETAMINOPHEN 650 MG: 325 TABLET ORAL at 17:57

## 2021-10-16 RX ADMIN — CLOPIDOGREL BISULFATE 75 MG: 75 TABLET ORAL at 09:26

## 2021-10-16 RX ADMIN — POTASSIUM CHLORIDE 10 MEQ: 750 TABLET, FILM COATED, EXTENDED RELEASE ORAL at 15:32

## 2021-10-16 RX ADMIN — ASPIRIN 81 MG: 81 TABLET, COATED ORAL at 09:26

## 2021-10-16 RX ADMIN — FAMOTIDINE 20 MG: 10 INJECTION, SOLUTION INTRAVENOUS at 20:16

## 2021-10-16 NOTE — PROGRESS NOTES
[facility] [facility] Adult  Hospitalist Group                                                                                          Hospitalist Progress Note  Rabia Salter MD  Answering service: [phone] from in house phone        Date of Service:  10/16/2021  NAME:  Dayana Hairston  :    MRN:  [id number]      Admission Summary:   Dayana Hairston is a 66 y.o. female who presents with aphasia      Patient presented to [facility] with aphasia.     No history can be obtained from the patient, apparently patient went to bed in normal state, woke up today morning with complete aphasia and right-sided weakness, she was unable to speak or stand, came to the ER, was found to have acute CVA and was requested to be admitted to the hospitalist service. No further history could be obtained from the patient or chart review    Interval history / Subjective:    CC:  Nonverbal    Patient has had no events overnight. RN having difficulty obtaining lab today for platelet testing.  at bedside and all questions answered. Assessment & Plan:     Acute Left MCA ischemic stroke with right U/L weakness and aphasia -- not thrombectomy candidate as stroke completed at time of eval  - CTA:  possible occlusion of the anterior division of  the left anterior cerebral artery, with a relative paucity of vessels in the  left frontal lobe anterior middle cerebral artery territory  - MRI:  Moderate acute infarction of the anterior inferior left frontal lobe,  predominantly cortically based without significant superimposed hemorrhage, mass  Effect.  Mild chronic microvascular ischemic change and mild cerebral atrophy.  - ; Lipitor 40 mg daily  - Cards:  EKG NSR, echo not done, on tele  - continue ASA/Plavix; was on ASA alone PTA; platelet studies ordered  - rehab services  - appreciate neuro consult  - CM for DC planning    HTN  - will place on lisinopril 10 mg daily for now (was on HCTZ 25mg daily at home)  - monitor    HLD  - will place on high intensity statin    Hypokalemia - prob due to HCTZ  - replace      Code status: Full  DVT prophylaxis: SCDs    Care Plan discussed with: Patient/Family; discussed with   Anticipated Disposition: SAH/Rehab vs SNF  Anticipated Discharge: 24 hours to 50 hours     Hospital Problems  Never Reviewed        Codes Class Noted POA    CVA (cerebral vascular accident) [facility]) ICD-10-CM: I63.9  ICD-9-CM: 434.91  10/15/2021 Unknown                Review of Systems:   Pertinent items are noted in HPI. Vital Signs:    Last 24hrs VS reviewed since prior progress note. Most recent are:  Visit Vitals  BP (!) 141/86 (BP 1 Location: Left arm)   Pulse 81   Temp 98.2 °F (36.8 °C)   Resp 18   Wt 123 kg (271 lb 2.7 oz)   SpO2 97%   BMI 42.47 kg/m²       No intake or output data in the 24 hours ending 10/16/21 1435     Physical Examination:             Constitutional:  No acute distress, mute, does not follow instructions       Resp:  CTA bilaterally. No wheezing/rhonchi/rales. No accessory muscle use   CV:  Regular rhythm, normal rate, no murmurs, gallops, rubs    GI:  Soft, non distended, non tender.  Protuberant normoactive bowel sounds, no hepatosplenomegaly     Musculoskeletal:  No edema, warm, 2+ pulses throughout    Neurologic:  RUE/RLE weakness, right facial weakness, aphasia            Data Review:    Review and/or order of clinical lab test      Labs:     Recent Labs     10/16/21  0025 10/15/21  1035   WBC 9.9 7.3   HGB 12.6 13.0   HCT 39.2 40.5    270     Recent Labs     10/16/21  0025 10/15/21  1035    143   K 3.4* 3.7    104   CO2 26 36*   BUN 20 30*   CREA 0.52* 0.72   * 128*   CA 8.7 8.8   MG 2.1  --    PHOS 3.0  --      Recent Labs     10/15/21  1035   ALT 22   AP 86   TBILI 0.5   TP 7.1   ALB 3.8   GLOB 3.3     Recent Labs     10/15/21  1035   INR 1.1   PTP 10.6      No results for input(s): FE, TIBC, PSAT, FERR in the last 72 hours. No results found for: FOL, RBCF   No results for input(s): PH, PCO2, PO2 in the last 72 hours.   Recent Labs     10/16/21  0025 10/15/21  1736   CPK 43 43     Lab Results   Component Value Date/Time    Cholesterol, total 162 10/16/2021 12:25 AM    HDL Cholesterol 39 10/16/2021 12:25 AM    LDL, calculated 102.6 (H) 10/16/2021 12:25 AM    Triglyceride 102 10/16/2021 12:25 AM    CHOL/HDL Ratio 4.2 10/16/2021 12:25 AM     Lab Results   Component Value Date/Time    Glucose (POC) 127 (H) 10/16/2021 11:56 AM    Glucose (POC) 119 (H) 10/16/2021 07:31 AM    Glucose (POC) 109 10/15/2021 10:55 PM    Glucose (POC) 112 10/15/2021 10:30 AM     No results found for: COLOR, APPRN, SPGRU, REFSG, HARRISON, PROTU, GLUCU, KETU, BILU, UROU, ANAY, LEUKU, GLUKE, EPSU, BACTU, WBCU, RBCU, CASTS, UCRY      Medications Reviewed:     Current Facility-Administered Medications   Medication Dose Route Frequency    aspirin delayed-release tablet 81 mg  81 mg Oral DAILY    atorvastatin (LIPITOR) tablet 10 mg  10 mg Oral DAILY    acetaminophen (TYLENOL) tablet 650 mg  650 mg Oral Q4H PRN    Or    acetaminophen (TYLENOL) solution 650 mg  650 mg Per NG tube Q4H PRN    Or    acetaminophen (TYLENOL) suppository 650 mg  650 mg Rectal Q4H PRN    0.9% sodium chloride infusion  75 mL/hr IntraVENous CONTINUOUS    clopidogreL (PLAVIX) tablet 75 mg  75 mg Oral DAILY    famotidine (PF) (PEPCID) 20 mg in 0.9% sodium chloride 10 mL injection  20 mg IntraVENous Q12H     ______________________________________________________________________  EXPECTED LENGTH OF STAY: - - -  ACTUAL LENGTH OF STAY:          1                 Karin Fairbanks MD

## 2021-10-16 NOTE — PROGRESS NOTES
Problem: Patient Education: Go to Patient Education Activity  Goal: Patient/Family Education  Outcome: Progressing Towards Goal     Problem: TIA/CVA Stroke: 0-24 hours  Goal: Off Pathway (Use only if patient is Off Pathway)  Outcome: Progressing Towards Goal  Goal: Activity/Safety  Outcome: Progressing Towards Goal  Goal: Consults, if ordered  Outcome: Progressing Towards Goal  Goal: Diagnostic Test/Procedures  Outcome: Progressing Towards Goal  Goal: Nutrition/Diet  Outcome: Progressing Towards Goal  Goal: Discharge Planning  Outcome: Progressing Towards Goal  Goal: Medications  Outcome: Progressing Towards Goal  Goal: Respiratory  Outcome: Progressing Towards Goal  Goal: Treatments/Interventions/Procedures  Outcome: Progressing Towards Goal  Goal: Minimize risk of bleeding post-thrombolytic infusion  Outcome: Progressing Towards Goal  Goal: Monitor for complications post-thrombolytic infusion  Outcome: Progressing Towards Goal  Goal: Psychosocial  Outcome: Progressing Towards Goal  Goal: *Hemodynamically stable  Outcome: Progressing Towards Goal  Goal: *Neurologically stable  Description: Absence of additional neurological deficits    Outcome: Progressing Towards Goal  Goal: *Verbalizes anxiety and depression are reduced or absent  Outcome: Progressing Towards Goal  Goal: *Absence of Signs of Aspiration on Current Diet  Outcome: Progressing Towards Goal  Goal: *Absence of deep venous thrombosis signs and symptoms(Stroke Metric)  Outcome: Progressing Towards Goal  Goal: *Ability to perform ADLs and demonstrates progressive mobility and function  Outcome: Progressing Towards Goal  Goal: *Stroke education started(Stroke Metric)  Outcome: Progressing Towards Goal  Goal: *Dysphagia screen performed(Stroke Metric)  Outcome: Progressing Towards Goal  Goal: *Rehab consulted(Stroke Metric)  Outcome: Progressing Towards Goal     Problem: TIA/CVA Stroke: Day 2 Until Discharge  Goal: Off Pathway (Use only if patient is Off Pathway)  Outcome: Progressing Towards Goal  Goal: Activity/Safety  Outcome: Progressing Towards Goal  Goal: Diagnostic Test/Procedures  Outcome: Progressing Towards Goal  Goal: Nutrition/Diet  Outcome: Progressing Towards Goal  Goal: Discharge Planning  Outcome: Progressing Towards Goal  Goal: Medications  Outcome: Progressing Towards Goal  Goal: Respiratory  Outcome: Progressing Towards Goal  Goal: Treatments/Interventions/Procedures  Outcome: Progressing Towards Goal  Goal: Psychosocial  Outcome: Progressing Towards Goal  Goal: *Verbalizes anxiety and depression are reduced or absent  Outcome: Progressing Towards Goal  Goal: *Absence of aspiration  Outcome: Progressing Towards Goal  Goal: *Absence of deep venous thrombosis signs and symptoms(Stroke Metric)  Outcome: Progressing Towards Goal  Goal: *Optimal pain control at patient's stated goal  Outcome: Progressing Towards Goal  Goal: *Tolerating diet  Outcome: Progressing Towards Goal  Goal: *Ability to perform ADLs and demonstrates progressive mobility and function  Outcome: Progressing Towards Goal  Goal: *Stroke education continued(Stroke Metric)  Outcome: Progressing Towards Goal     Problem: Ischemic Stroke: Discharge Outcomes  Goal: *Verbalizes anxiety and depression are reduced or absent  Outcome: Progressing Towards Goal  Goal: *Verbalize understanding of risk factor modification(Stroke Metric)  Outcome: Progressing Towards Goal  Goal: *Hemodynamically stable  Outcome: Progressing Towards Goal  Goal: *Absence of aspiration pneumonia  Outcome: Progressing Towards Goal  Goal: *Aware of needed dietary changes  Outcome: Progressing Towards Goal  Goal: *Verbalize understanding of prescribed medications including anti-coagulants, anti-lipid, and/or anti-platelets(Stroke Metric)  Outcome: Progressing Towards Goal  Goal: *Tolerating diet  Outcome: Progressing Towards Goal  Goal: *Aware of follow-up diagnostics related to anticoagulants  Outcome: Progressing Towards Goal  Goal: *Ability to perform ADLs and demonstrates progressive mobility and function  Outcome: Progressing Towards Goal  Goal: *Absence of DVT(Stroke Metric)  Outcome: Progressing Towards Goal  Goal: *Absence of aspiration  Outcome: Progressing Towards Goal  Goal: *Optimal pain control at patient's stated goal  Outcome: Progressing Towards Goal  Goal: *Home safety concerns addressed  Outcome: Progressing Towards Goal  Goal: *Describes available resources and support systems  Outcome: Progressing Towards Goal  Goal: *Verbalizes understanding of activation of EMS(911) for stroke symptoms(Stroke Metric)  Outcome: Progressing Towards Goal  Goal: *Understands and describes signs and symptoms to report to providers(Stroke Metric)  Outcome: Progressing Towards Goal  Goal: *Neurolgocially stable (absence of additional neurological deficits)  Outcome: Progressing Towards Goal  Goal: *Verbalizes importance of follow-up with primary care physician(Stroke Metric)  Outcome: Progressing Towards Goal  Goal: *Smoking cessation discussed,if applicable(Stroke Metric)  Outcome: Progressing Towards Goal  Goal: *Depression screening completed(Stroke Metric)  Outcome: Progressing Towards Goal     Problem: Falls - Risk of  Goal: *Absence of Falls  Description: Document Rose Mary Fall Risk and appropriate interventions in the flowsheet.   Outcome: Progressing Towards Goal  Note: Fall Risk Interventions:       Mentation Interventions: Door open when patient unattended, Bed/chair exit alarm    Medication Interventions: Bed/chair exit alarm, Patient to call before getting OOB    Elimination Interventions: Call light in reach, Bed/chair exit alarm              Problem: Patient Education: Go to Patient Education Activity  Goal: Patient/Family Education  Outcome: Progressing Towards Goal

## 2021-10-16 NOTE — PROGRESS NOTES
CM went to meet with pt for initial eval, but she is aphasic. CM called her 's home and cell phones and left voice mail messages for him. Will attempt to call again later.  Francisco Javier Lawrence

## 2021-10-16 NOTE — PROGRESS NOTES
Bedside shift change report given to [address] (oncoming nurse) by Jeremy Mace RN (offgoing nurse). Report included the following information SBAR, Kardex, MAR, Recent Results and Quality Measures.

## 2021-10-16 NOTE — PROGRESS NOTES
Occupational Therapy  10/16/21    Orders received, chart reviewed and patient evaluated by occupational therapy. Pending progression with skilled acute occupational therapy, recommend:  Rehab--IPR vs SNF pending progress    Recommend with nursing ADLs with assist, HOB elevation as tolerated, and rolling in supine for toileting with 2 assist. Thank you for completing as able in order to maintain patient strength, endurance and independence. Full evaluation to follow.      Thank you,   Jermaine Najera, OTSUSAN, OTR/L

## 2021-10-16 NOTE — PROGRESS NOTES
Problem: Self Care Deficits Care Plan (Adult)  Goal: *Acute Goals and Plan of Care (Insert Text)  Description: FUNCTIONAL STATUS PRIOR TO ADMISSION: Patient is globally aphasic, unable to provide PLOF, limited information in chart with indication of a spouse. HOME SUPPORT: Unclear per chart review    Occupational Therapy Goals  Initiated 10/16/2021   1. Patient will perform self-feeding with maximal assistance within 7 day(s). 2.  Patient will perform grooming with moderate assistance within 7 day(s). 3.  Patient will perform lower body dressing with maximal assistance within 7 day(s). 4.  Patient will perform toilet transfers to/from [facility] with maximal assistance x2 within 7 day(s). 5.  Patient will perform all aspects of toileting with maximal assistance within 7 day(s). 6.  Patient will participate in upper extremity therapeutic exercise/activities with supervision/set-up within 7 day(s). 7.  Patient will utilize energy conservation techniques during functional activities with verbal and visual cues within 7 day(s). 8.  Patient will complete the Fugl Lafleur UE Assessment within 7 days. Outcome: Not Met     OCCUPATIONAL THERAPY EVALUATION  Patient: Silvia Segal (39 y.o. female)  Date: 10/16/2021  Primary Diagnosis: CVA (cerebral vascular accident) [facility]) [I63.9]        Precautions: Fall, Skin    ASSESSMENT  Based on the objective data described below, the patient presents with global aphasia (seemingly expressive>receptive) decreased balance, command following, cognition, strength, coordination, AROM (R>L), activity tolerance, coordination, and attention to the R s/p admission for L MCA CVA. PLOF unclear as she is globally aphasic, only indication of  in chart. She now presents with Max-Total A x2 for bed mobility, Total A for all ADLs. Mod-max multimodal cues required for all functional tasks with fair carryover, MAX cues for visual scanning to the R with fair-poor carryover.  Of note, SBP dropping ~20 points with transition for elevated HOB to supine, dizziness reported throughout (shaking head yes all times asked). Pending PLOF and progression with acute care therapies, anticipate she will require extensive rehabilitation. Current Level of Function Impacting Discharge (ADLs/self-care): Total A for ADLs, Max-Total A x2 for rolling    Functional Outcome Measure: The patient scored 0/100 on the Barthel Index, indicating she is totally dependent is basic self care    Other factors to consider for discharge: assist of 2, significant neuro deficits, unknown PLOF     Patient will benefit from skilled therapy intervention to address the above noted impairments. PLAN :  Recommendations and Planned Interventions: self care training, functional mobility training, therapeutic exercise, balance training, visual/perceptual training, therapeutic activities, cognitive retraining, endurance activities, neuromuscular re-education, patient education, home safety training, and family training/education    Frequency/Duration: Patient will be followed by occupational therapy 5 times a week to address goals. Recommendation for discharge: (in order for the patient to meet his/her long term goals)  Rehab--IPR vs SNF pending progress    This discharge recommendation:  A follow-up discussion with the attending provider and/or case management is planned    IF patient discharges home will need the following DME: To be determined (TBD)         SUBJECTIVE:   Patient stated Yes.     OBJECTIVE DATA SUMMARY:   HISTORY:   No past medical history on file. No past surgical history on file.   Expanded or extensive additional review of patient history:          Hand dominance: Right    EXAMINATION OF PERFORMANCE DEFICITS:  Cognitive/Behavioral Status:  Neurologic State: Eyes open spontaneously;Drowsy (aphasic)  Orientation Level: Unable to verbalize;Disoriented X4  Cognition: Decreased attention/concentration;Decreased command following; Impaired decision making  Perception: Cues to attend right visual field;Cues to attend to right side of body  Perseveration: No perseveration noted  Safety/Judgement: Decreased awareness of environment;Decreased awareness of need for assistance;Decreased awareness of need for safety;Decreased insight into deficits    Skin: Appears intact    Edema: None    Hearing:       Vision/Perceptual:    Tracking: Able to track left of midline; Able to track right of midline; Requires cues, head turns, or add eye shifts to track (increased time & cues for attention to R, decr targeting)    Saccades: Unable to test secondary to decreased visual attention    Convergence: Unable to test secondary due to decreased visual attention    Visual Fields: Unable to test secondary due to decreased visual attention  Diplopia:  (unable to assess)    Acuity:  (unable to assess)         Range of Motion:  AROM: Grossly decreased, non-functional                         Strength:  Strength: Grossly decreased, non-functional                Coordination:  Coordination: Grossly decreased, non-functional  Fine Motor Skills-Upper: Left Impaired;Right Impaired (R moreso impaired)    Gross Motor Skills-Upper: Left Impaired;Right Impaired (R moreso impaired)    Tone & Sensation:  Tone: Abnormal (slight flexor tone in RUE)  Sensation:  (unable to assess d/t cognition & aphasia)                      Balance:       Functional Mobility and Transfers for ADLs:  Bed Mobility:  Rolling: Maximum assistance;Assist x2  Supine to Sit: Total assistance;Assist x2  Sit to Supine: Total assistance;Assist x2  Scooting: Maximum assistance;Assist x2; Additional time    Transfers: Toilet Transfer : Total assistance;Assist x2 (infer per mobility)    ADL Assessment:  Feeding: Total assistance    Oral Facial Hygiene/Grooming: Total assistance    Bathing: Total assistance    Upper Body Dressing:  Total assistance    Lower Body Dressing: Total assistance    Toileting: Total assistance                ADL Intervention and task modifications:     Lower Body Dressing Assistance  Dressing Assistance: Total assistance(dependent)  Socks: Total assistance (dependent)  Leg Crossed Method Used: No  Position Performed: Supine  Cues: Physical assistance;Verbal cues provided    Toileting  Toileting Assistance: Total assistance(dependent) (purewick)    Cognitive Retraining  Safety/Judgement: Decreased awareness of environment;Decreased awareness of need for assistance;Decreased awareness of need for safety;Decreased insight into deficits    Neuromuscular Re-Education:  Visual scanning to R side to locate RUE, repositioned into midline with limited sustained visual attention with fair-poor carryover & quick fatigue  RUE AROM, AAROM, and PROM    Functional Measure:    Barthel Index:  Bathin  Bladder: 0  Bowels: 0  Groomin  Dressin  Feedin  Mobility: 0  Stairs: 0  Toilet Use: 0  Transfer (Bed to Chair and Back): 0  Total: 0/100      The Barthel ADL Index: Guidelines  1. The index should be used as a record of what a patient does, not as a record of what a patient could do. 2. The main aim is to establish degree of independence from any help, physical or verbal, however minor and for whatever reason. 3. The need for supervision renders the patient not independent. 4. A patient's performance should be established using the best available evidence. Asking the patient, friends/relatives and nurses are the usual sources, but direct observation and common sense are also important. However direct testing is not needed. 5. Usually the patient's performance over the preceding 24-48 hours is important, but occasionally longer periods will be relevant. 6. Middle categories imply that the patient supplies over 50 per cent of the effort. 7. Use of aids to be independent is allowed.     Score Interpretation (from [address] [address])    Independent 60-79 Minimally independent   40-59 Partially dependent   20-39 Very dependent   <20 Totally dependent     -Bethel Blood, Barthel, D.W. (1634). Functional evaluation: the Barthel Index. [address] ([address] (1997). The Barthel activities of daily living index: self-reporting versus actual performance in the old (> or = 75 years). Journal of [address] [place] [address] 45(7), [facility], TINA, Lakshmi Juarez., Haroon Argue. (1999). Measuring the change in disability after inpatient rehabilitation; comparison of the responsiveness of the Barthel Index and Functional [place] Measure. Journal of Neurology, Neurosurgery, and Psychiatry, 66(4), 901-384. MYRIAM Espinoza, TAMIKA Celeste, & Connie Hayes MJEAN-CLAUDE. (2004) Assessment of post-stroke quality of life in cost-effectiveness studies: The usefulness of the Barthel Index and the EuroQoL-5D. Quality of Life Research, 15, 467-69         Occupational Therapy Evaluation Charge Determination   History Examination Decision-Making   LOW Complexity : Brief history review  MEDIUM Complexity : 3-5 performance deficits relating to physical, cognitive , or psychosocial skils that result in activity limitations and / or participation restrictions MEDIUM Complexity : Patient may present with comorbidities that affect occupational performnce.  Miniml to moderate modification of tasks or assistance (eg, physical or verbal ) with assesment(s) is necessary to enable patient to complete evaluation       Based on the above components, the patient evaluation is determined to be of the following complexity level: LOW   Pain Rating:  Nodding head yes when asked but unable to verbalize or point to where    Activity Tolerance:   Poor    After treatment patient left in no apparent distress:    Supine in bed, Call bell within reach, Bed / chair alarm activated, and Side rails x 3    COMMUNICATION/EDUCATION:   The patients plan of care was discussed with: Physical therapist and Registered nurse. Patient with impaired cognition, not appropriate for BE FAST and deficit education at this time    Patient is unable to participate in goal setting and plan of care. This patients plan of care is appropriate for delegation to [facility].     Thank you for this referral.  DUY Stevens, OTR/L  Time Calculation: 23 mins

## 2021-10-16 NOTE — PROGRESS NOTES
Bedside shift change report given to Christoph (oncoming nurse) by Julián Browning RN (offgoing nurse). Report included the following information SBAR, Kardex, ED Summary, Intake/Output, MAR, Cardiac Rhythm NSR, Alarm Parameters  and Dual Neuro Assessment.

## 2021-10-16 NOTE — PROGRESS NOTES
Problem: Mobility Impaired (Adult and Pediatric)  Goal: *Acute Goals and Plan of Care (Insert Text)  Outcome: Progressing Towards Goal  Note: FUNCTIONAL STATUS PRIOR TO ADMISSION:  Per documentation, it is inferred patient was independent PLOF however patient is aphasic and unable to provide medical history    HOME SUPPORT PRIOR TO ADMISSION: The patient lived with . Physical Therapy Goals  Initiated 10/16/2021  1. Patient will move from supine to sit and sit to supine , scoot up and down, and roll side to side in bed with moderate assistance  within 7 day(s). 2.  Patient will transfer from bed to chair and chair to bed with maximal assistance using the least restrictive device within 7 day(s). 3.  Patient will perform sit to stand with maximal assistance within 7 day(s). PHYSICAL THERAPY EVALUATION- NEURO POPULATION  Patient: Chito Burton (79 y.o. female)  Date: 10/16/2021  Primary Diagnosis: CVA (cerebral vascular accident) [facility]) [I63.9]        Precautions: R hemiparesis,  Fall, Skin      ASSESSMENT  Based on the objective data described below, the patient presents with significant mobility deficits s/p L CVA. Patient with significant R hemiparesis and reduced function of LLE/LUE as well. Patient demonstrates expressive aphasic but able to nod yes and no and verbalized the word \"yep\" on two occassions. When asking yes/no questions about her  and orientation, patient is oriented to herself only. Unclear how much she is comprehending at this time. Patient able to follow simple one step commands with extensive encouragement and extra time. Patient demonstrates non functional AROM and strength of RUE/RLE, reduced AROM/strength of LUE/LLE (2-/5). Patient requires TA x 2 for rolling R/L and scooting in bed. Unable to tolerate supine to sit or sit to supine. Infer based on mobility witnessed that patient would require use of van lift for any transfers at this point.  Patient with reports of dizziness when questioned, decreased BP measures when transitioning from supine with head elevated to supine with head flat. Patient also reports double vision when questioned and demonstrates spontaneous nystagmus in both eyes. Unclear what her PLOF as patient was unable to report and no family present at time of evaluation. Per documentation,  has contacted  to obtain this information. Current Level of Function Impacting Discharge (mobility/balance): TA x 2 for all mobility, recommend use of van lift OOB    Functional Outcome Measure: The patient scored 0/56 on the [address] which is indicative of high fall risk. Other factors to consider for discharge: unknown PLOF at this time. Likely will require IPR or SNF placement due to significant mobility deficits. Patient will benefit from skilled therapy intervention to address the above noted impairments. PLAN :  Recommendations and Planned Interventions: bed mobility training, transfer training, therapeutic exercises, neuromuscular re-education, patient and family training/education, and therapeutic activities      Frequency/Duration: Patient will be followed by physical therapy:  5 times a week to address goals. Recommendation for discharge: (in order for the patient to meet his/her long term goals)  Therapy 3 hours per day 5-7 days per week or SNF placement depending on PLOF and progression with care in acute setting    This discharge recommendation:  Has not yet been discussed the attending provider and/or case management    IF patient discharges home will need the following DME: to be determined (TBD), unknown due to patient unable to provide history         SUBJECTIVE:   Patient pleasant and smiles but non verbal except replying \"yep\" on two occassions    OBJECTIVE DATA SUMMARY:   HISTORY:    No past medical history on file. No past surgical history on file.     Personal factors and/or comorbidities impacting plan of care: significant mobility deficits present today, unclear PLOF at this time          EXAMINATION/PRESENTATION/DECISION MAKING:   Critical Behavior:  Neurologic State: Eyes open spontaneously, Drowsy (aphasic)  Orientation Level: Unable to verbalize, Disoriented X4  Cognition: Decreased attention/concentration, Decreased command following, Impaired decision making  Safety/Judgement: Decreased awareness of environment, Decreased awareness of need for assistance, Decreased awareness of need for safety, Decreased insight into deficits  Hearing:     Skin:  unremarkable  Edema: unremarkable  Range Of Motion:  AROM: Grossly decreased, non-functional        Strength:    Strength: Grossly decreased, non-functional       Tone & Sensation:   Tone: Abnormal (slight flexor tone in RUE)       Sensation:  (unable to assess d/t cognition & aphasia)        Coordination:  Coordination: Grossly decreased, non-functional  Vision:   Tracking: Able to track left of midline; Able to track right of midline; Requires cues, head turns, or add eye shifts to track (increased time & cues for attention to R, decr targeting)  Saccades: Unable to test secondary to decreased visual attention  Convergence: Unable to test secondary due to decreased visual attention  Visual Fields: Unable to test secondary due to decreased visual attention  Diplopia:  (unable to assess)  Acuity:  (unable to assess)  Functional Mobility:  Bed Mobility:  Rolling: Maximum assistance;Assist x2  Supine to Sit: Total assistance;Assist x2  Sit to Supine: Total assistance;Assist x2  Scooting: Maximum assistance;Assist x2; Additional time  Transfers:     Infer with mobility observed, patient would require TA x 2 with use of van lift    Balance:    Unable to assess    Ambulation/Gait Training:     Non ambulatory, unable to assess       Functional Measure    Barthel Index:    Bathin  Bladder: 0  Bowels: 0  Groomin  Dressin  Feedin  Mobility: 0  Stairs: 0  Toilet Use: 0  Transfer (Bed to Chair and Back): 0  Total: 0/100       The Barthel ADL Index: Guidelines  1. The index should be used as a record of what a patient does, not as a record of what a patient could do. 2. The main aim is to establish degree of independence from any help, physical or verbal, however minor and for whatever reason. 3. The need for supervision renders the patient not independent. 4. A patient's performance should be established using the best available evidence. Asking the patient, friends/relatives and nurses are the usual sources, but direct observation and common sense are also important. However direct testing is not needed. 5. Usually the patient's performance over the preceding 24-48 hours is important, but occasionally longer periods will be relevant. 6. Middle categories imply that the patient supplies over 50 per cent of the effort. 7. Use of aids to be independent is allowed. Brigida Swan, Barthel, D.W. (3411). Functional evaluation: the Barthel Index. [facility] (14)2. TINA Campoverde Se, Leonard Garza., Geetha Rosas., [place], [address] (1999). Measuring the change indisability after inpatient rehabilitation; comparison of the responsiveness of the Barthel Index and Functional Wilkin Measure. Journal of Neurology, Neurosurgery, and Psychiatry, 66(4), 779-885. PATRICIA Mccullough.CHERYL, TAMIKA Celeste, & Christen Swartz, M.A. (2004.) Assessment of post-stroke quality of life in cost-effectiveness studies: The usefulness of the Barthel Index and the EuroQoL-5D.  Quality of Life Research, 13, 811-08        Extended / Orthostatic Vitals:    Vital Signs  Level of Consciousness: Alert (0) (10/16/21 0958)  Temp: 98.2 °F (36.8 °C) (10/16/21 0958)  Temp Source: Oral (10/16/21 0958)  Pulse (Heart Rate): 70 (10/16/21 1200)  Heart Rate Source: Monitor (10/16/21 1200)  Cardiac Rhythm: Sinus Rhythm (10/16/21 1200)  Resp Rate: 18 (10/16/21 0958)  BP: (!) 141/86 (10/16/21 0958)  MAP (Monitor): 75 (10/15/21 1930)  MAP (Calculated): 104 (10/16/21 0958)  BP 1 Location: Left arm (10/16/21 0958)  BP 1 Method: Automatic (10/16/21 0900)  BP Patient Position: At rest;Supine; Other (Comment) (head elevated) (10/16/21 0900)  MEWS Score: 1 (10/16/21 3825)  More BP/Pulse rows needed?: Yes (10/16/21 0900)    Additional Blood Pressure/Pulse Data  BP 2: 128/74 (10/16/21 0900)  BP 2 Location: Left arm (10/16/21 0900)  BP Method 2: Automatic (10/16/21 0900)  Patient Position 2: Supine; At rest;Other (Comment) (flat) (10/16/21 0900)  Pulse 3: 72 (10/16/21 0900)  BP 3: 145/59 (10/16/21 0900)  BP 3 Location: Left arm (10/16/21 0900)  BP Method 3: Automatic (10/16/21 0900)  Patient Position 3: Supine (10/16/21 0900)    Oxygen Therapy  O2 Sat (%): 97 % (10/16/21 0958)  Pulse via Oximetry: 70 beats per minute (10/15/21 1930)  O2 Device: None (Room air) (10/16/21 0800)        Physical Therapy Evaluation Charge Determination   History Examination Presentation Decision-Making   MEDIUM  Complexity : 1-2 comorbidities / personal factors will impact the outcome/ POC  LOW Complexity : 1-2 Standardized tests and measures addressing body structure, function, activity limitation and / or participation in recreation  LOW Complexity : Stable, uncomplicated  LOW Complexity : FOTO score of       Based on the above components, the patient evaluation is determined to be of the following complexity level: LOW     Pain Rating:  Unable to provide pain rating    Activity Tolerance:   Poor, requires rest breaks, and signs and symptoms of orthostatic hypotension      After treatment patient left in no apparent distress:   Supine in bed, Heels elevated for pressure relief, Call bell within reach, and Side rails x 3    COMMUNICATION/EDUCATION:   The patients plan of care was discussed with: Occupational therapist and Registered nurse.      Patient was educated regarding her deficit(s) as this relates to her diagnosis of CVA. She demonstrated Fair understanding as evidenced by nodding head yes and smiling. Patient is unable to participate in goal setting and plan of care.     Thank you for this referral.  Keyla Gaviria, PT   Time Calculation: 23 mins

## 2021-10-16 NOTE — CONSULTS
INPATIENT NEUROLOGY CONSULTATION  10/16/2021     Consulted by: Tip Weaver MD        Patient ID:  Sam Roblero  [id number]  66 y.o.  1950    CC: Weakness and difficulty speaking    HPI    Ms. Rajeev Ramirez is a 78-year-old woman with no known medical history who was found with increasing weakness and inability to produce words. She was transferred here from an outside hospital.  She remains aphasic and weak. It looks like she was on low-dose aspirin prior to admission. Head CT with MRI confirming left MCA territory infarcts in the posterior frontal lobe anterior temporal lobe. CTA unrevealing. LDL elevated at 102. She indicates to me she has a headache, but no nausea. Review of Systems   Unable to perform ROS: Language       No past medical history on file. No family history on file. Social History     Socioeconomic History    Marital status:      Spouse name: Not on file    Number of children: Not on file    Years of education: Not on file    Highest education level: Not on file   Occupational History    Not on file   Tobacco Use    Smoking status: Not on file   Substance and Sexual Activity    Alcohol use: Not on file    Drug use: Not on file    Sexual activity: Not on file   Other Topics Concern    Not on file   Social History Narrative    Not on file     Social Determinants of Health     Financial Resource Strain:     Difficulty of Paying Living Expenses:    Food Insecurity:     Worried About Running Out of Food in the Last Year:     [address] in the Last Year:    Transportation Needs:     Lack of Transportation (Medical):      Lack of Transportation (Non-Medical):    Physical Activity:     Days of Exercise per Week:     Minutes of Exercise per Session:    Stress:     Feeling of Stress :    Social Connections:     Frequency of Communication with Friends and Family:     Frequency of Social Gatherings with Friends and Family:     Attends Orthodox Services:     Active Member of Clubs or Organizations:     Attends Club or Organization Meetings:     Marital Status:    Intimate Partner Violence:     Fear of Current or Ex-Partner:     Emotionally Abused:     Physically Abused:     Sexually Abused:      Current Facility-Administered Medications   Medication Dose Route Frequency    aspirin delayed-release tablet 81 mg  81 mg Oral DAILY    atorvastatin (LIPITOR) tablet 10 mg  10 mg Oral DAILY    acetaminophen (TYLENOL) tablet 650 mg  650 mg Oral Q4H PRN    Or    acetaminophen (TYLENOL) solution 650 mg  650 mg Per NG tube Q4H PRN    Or    acetaminophen (TYLENOL) suppository 650 mg  650 mg Rectal Q4H PRN    0.9% sodium chloride infusion  75 mL/hr IntraVENous CONTINUOUS    clopidogreL (PLAVIX) tablet 75 mg  75 mg Oral DAILY    famotidine (PF) (PEPCID) 20 mg in 0.9% sodium chloride 10 mL injection  20 mg IntraVENous Q12H     No Known Allergies    Visit Vitals  BP (!) 153/64 (BP 1 Location: Left arm, BP Patient Position: At rest)   Pulse 75   Temp 98.7 °F (37.1 °C)   Resp 20   Wt 271 lb 2.7 oz (123 kg)   SpO2 95%   BMI 42.47 kg/m²     Physical Exam  Vitals and nursing note reviewed. Constitutional:       Appearance: She is obese. Neurologic Exam     Mental Status   Elderly woman in bed very drowsy. She wakes to voice. She can follow basic commands such as squeeze my hand and stick her tongue out. She is aphasic. There is some impairment in comprehension following more complex commands. Pupils are equal symmetric with a left gaze preference. Face is asymmetric to the right. She cannot activate the right upper extremity. She can activate her right toes but cannot lift. She does not lift her left leg on command I suspect due to difficulty with comprehension. She can squeeze with the left hand on command. No abnormal movements.   Gait deferred due to condition            Lab Results   Component Value Date/Time    WBC 9.9 10/16/2021 12:25 AM HGB 12.6 10/16/2021 12:25 AM    HCT 39.2 10/16/2021 12:25 AM    PLATELET 265 11/05/5823 12:25 AM    MCV 97.0 10/16/2021 12:25 AM     Lab Results   Component Value Date/Time    Hemoglobin A1c 5.8 (H) 10/16/2021 12:25 AM    Glucose 125 (H) 10/16/2021 12:25 AM    Glucose (POC) 119 (H) 10/16/2021 07:31 AM    LDL, calculated 102.6 (H) 10/16/2021 12:25 AM    Creatinine 0.52 (L) 10/16/2021 12:25 AM      Lab Results   Component Value Date/Time    Cholesterol, total 162 10/16/2021 12:25 AM    HDL Cholesterol 39 10/16/2021 12:25 AM    LDL, calculated 102.6 (H) 10/16/2021 12:25 AM    Triglyceride 102 10/16/2021 12:25 AM    CHOL/HDL Ratio 4.2 10/16/2021 12:25 AM     Lab Results   Component Value Date/Time    ALT (SGPT) 22 10/15/2021 10:35 AM    Alk. phosphatase 86 10/15/2021 10:35 AM    Bilirubin, total 0.5 10/15/2021 10:35 AM    Albumin 3.8 10/15/2021 10:35 AM    Protein, total 7.1 10/15/2021 10:35 AM    INR 1.1 10/15/2021 10:35 AM    Prothrombin time 10.6 10/15/2021 10:35 AM    PLATELET 881 60/15/9415 12:25 AM        CT Results (maximum last 3): Results from [place] encounter on 10/15/21    CT CODE NEURO PERF W CBF    Narrative  EXAM:  CT CODE NEURO PERF W CBF    INDICATION:  CVA transfer with LVO    COMPARISON:  CT head and CT angiogram of earlier in the day    TECHNIQUE:    CT brain perfusion was performed with generation of hemodynamic maps of multiple  parameters, including cerebral blood flow, cerebral volume, Tmax and MTT (mean  transit time). The study was performed following the IV injection of 50 cc  Isovue-370. This study was analyzed by the [address] ai algorithm. CT dose reduction was achieved through the use of a standardized protocol  tailored for this examination and automatic exposure control for dose  modulation. FINDINGS:    CT PERFUSION:  There is diminished perfusion in the left middle cerebral artery territory in  the left frontal lobe. rCBF < 30% = 26 cc.   Tmax > 6 seconds = 46 cc.  Mismatch volume: 20 cc. Mismatch ratio: 1.8    Impression  Ischemia in the left middle cerebral artery territory in the left frontal lobe. 20 cc mismatch volume. Mismatch ratio 1.8. CT CODE NEURO HEAD WO CONTRAST    Narrative  EXAM: CT CODE NEURO HEAD WO CONTRAST    INDICATION: CVA transfer    COMPARISON: 1043 hours and 1030 hours. CONTRAST: None. TECHNIQUE: Unenhanced CT of the head was performed using 5 mm images. Brain and  bone windows were generated. Coronal and sagittal reformats. CT dose reduction  was achieved through use of a standardized protocol tailored for this  examination and automatic exposure control for dose modulation. FINDINGS:  The ventricles and sulci are normal in size, shape and configuration. . Air is  loss of the gray-white differentiation in the left posterior frontal and left  anterior parietal lobes (series 2, image 20). . There is a hyperdense left MCA. There is no intracranial hemorrhage, extra-axial collection, or mass effect. The  basilar cisterns are open. No CT evidence of acute infarct. The bone windows demonstrate no abnormalities. The visualized portions of the  paranasal sinuses and mastoid air cells are clear. Impression  Findings compatible with acute left MCA infarct. No evidence for parenchymal hemorrhage at this time. This result was verbally relayed by me at 1341 hours to Dania Infante, [address]      MRI Results (maximum last 3): Results from [place] encounter on 10/15/21    MRI BRAIN WO CONT    Narrative  *PRELIMINARY REPORT*    There is acute infarction in the gray matter of the left frontal lobe in an area  measuring approximately 5.4 x 2.3 cm. Preliminary report was provided by Dr. Angelo Reese, the on-call radiologist, at 8:00 PM  on 10/15/2021    Final report to follow. *END PRELIMINARY REPORT*    CLINICAL HISTORY: Left MCA territory ischemia. INDICATION: Left MCA territory ischemia.     COMPARISON: CTA perfusion and 1521    TECHNIQUE: MR examination of the brain includes axial and sagittal T1, coronal  T2, axial T2, axial FLAIR, axial gradient echo, axial DWI. CONTRAST: None    FINDINGS:  There is a moderate acute infarction in the anterior left frontal lobe  inferiorly. Infarct overlies the insular cortex. Mild periventricular and  scattered foci of increased T2 signal intensity and corona radiata and centrum  semiovale. Likely occluded anterior division branch vessel on the left. Minimal  sulcal and ventricular prominence. Infarct is predominantly cortical in  appearance. There is no significant superimposed hemorrhage midline shift or  mass effect. The brain architecture is normal. There is no evidence of midline  shift or mass-effect. There are no extra-axial fluid collections. There is no  Chiari or syrinx. The pituitary and infundibulum are grossly unremarkable. There  is no skull base mass. Cerebellopontine angles are grossly unremarkable. The  major intracranial vascular flow-voids are unremarkable. The cavernous sinuses  are symmetric. Optic chiasm and infundibulum grossly unremarkable. Orbits are  grossly symmetric. Dural venous sinuses are grossly patent. Trace sphenoid sinus disease. The mastoid air cells are well pneumatized and clear. Impression  Moderate acute infarction of the anterior inferior left frontal lobe,  predominantly cortically based without significant superimposed hemorrhage, mass  effect. Mild chronic microvascular ischemic change and mild cerebral atrophy. VAS/US/Carotid Doppler Results (maximum last 3): No results found for this or any previous visit. PET Results (maximum last 3): No results found for this or any previous visit. Assessment and Plan        24-year-old woman who has left MCA territory infarct with subsequent right upper extremity greater than lower extremity weakness and aphasia. I have added Plavix to her regimen.   She was already on aspirin to begin with which is now on aspirin failure. I plan to check aspirin assay and Plavix assay today. All of her medications are written as p.o. If for some reason she has not cleared swallowing she will need to be on aspirin AL if that is the case. Stroke work-up to proceed. Any acute change in exam please obtain a stat noncontrast head CT. We will continue to follow. During this evaluation, we also discussed stroke education to include signs and symptoms of stroke and TIA. This clinical note was dictated with an electronic dictation software that can make unintentional errors. If there are any questions, please contact me directly for clarification.       2 [facility], DO  NEUROLOGIST  Diplomate MAR  10/16/2021

## 2021-10-17 ENCOUNTER — APPOINTMENT (OUTPATIENT)
Dept: GENERAL RADIOLOGY | Age: 71
DRG: 064 | End: 2021-10-17
Attending: INTERNAL MEDICINE
Payer: MEDICARE

## 2021-10-17 LAB
B PERT DNA SPEC QL NAA+PROBE: NOT DETECTED
BORDETELLA PARAPERTUSSIS PCR, BORPAR: NOT DETECTED
C PNEUM DNA SPEC QL NAA+PROBE: NOT DETECTED
FLUAV H1 2009 PAND RNA SPEC QL NAA+PROBE: NOT DETECTED
FLUAV H1 RNA SPEC QL NAA+PROBE: NOT DETECTED
FLUAV H3 RNA SPEC QL NAA+PROBE: NOT DETECTED
FLUAV SUBTYP SPEC NAA+PROBE: NOT DETECTED
FLUBV RNA SPEC QL NAA+PROBE: NOT DETECTED
GLUCOSE BLD STRIP.AUTO-MCNC: 121 MG/DL (ref 65–117)
GLUCOSE BLD STRIP.AUTO-MCNC: 124 MG/DL (ref 65–117)
GLUCOSE BLD STRIP.AUTO-MCNC: 125 MG/DL (ref 65–117)
GLUCOSE BLD STRIP.AUTO-MCNC: 148 MG/DL (ref 65–117)
HADV DNA SPEC QL NAA+PROBE: NOT DETECTED
HCOV 229E RNA SPEC QL NAA+PROBE: NOT DETECTED
HCOV HKU1 RNA SPEC QL NAA+PROBE: NOT DETECTED
HCOV NL63 RNA SPEC QL NAA+PROBE: NOT DETECTED
HCOV OC43 RNA SPEC QL NAA+PROBE: NOT DETECTED
HMPV RNA SPEC QL NAA+PROBE: NOT DETECTED
HPIV1 RNA SPEC QL NAA+PROBE: NOT DETECTED
HPIV2 RNA SPEC QL NAA+PROBE: NOT DETECTED
HPIV3 RNA SPEC QL NAA+PROBE: NOT DETECTED
HPIV4 RNA SPEC QL NAA+PROBE: NOT DETECTED
M PNEUMO DNA SPEC QL NAA+PROBE: NOT DETECTED
RSV RNA SPEC QL NAA+PROBE: NOT DETECTED
RV+EV RNA SPEC QL NAA+PROBE: NOT DETECTED
SARS-COV-2 PCR, COVPCR: NOT DETECTED
SERVICE CMNT-IMP: ABNORMAL

## 2021-10-17 PROCEDURE — 74011250637 HC RX REV CODE- 250/637: Performed by: INTERNAL MEDICINE

## 2021-10-17 PROCEDURE — 71045 X-RAY EXAM CHEST 1 VIEW: CPT

## 2021-10-17 PROCEDURE — 74011250636 HC RX REV CODE- 250/636: Performed by: FAMILY MEDICINE

## 2021-10-17 PROCEDURE — 0202U NFCT DS 22 TRGT SARS-COV-2: CPT

## 2021-10-17 PROCEDURE — 65660000000 HC RM CCU STEPDOWN

## 2021-10-17 PROCEDURE — 74011250636 HC RX REV CODE- 250/636: Performed by: INTERNAL MEDICINE

## 2021-10-17 PROCEDURE — 74011000250 HC RX REV CODE- 250: Performed by: FAMILY MEDICINE

## 2021-10-17 PROCEDURE — 74011250637 HC RX REV CODE- 250/637: Performed by: FAMILY MEDICINE

## 2021-10-17 PROCEDURE — 74011000250 HC RX REV CODE- 250: Performed by: INTERNAL MEDICINE

## 2021-10-17 PROCEDURE — 82962 GLUCOSE BLOOD TEST: CPT

## 2021-10-17 RX ORDER — DEXTROSE MONOHYDRATE AND SODIUM CHLORIDE 5; .45 G/100ML; G/100ML
75 INJECTION, SOLUTION INTRAVENOUS CONTINUOUS
Status: DISCONTINUED | OUTPATIENT
Start: 2021-10-17 | End: 2021-10-25 | Stop reason: HOSPADM

## 2021-10-17 RX ORDER — HYDROCHLOROTHIAZIDE 25 MG/1
12.5 TABLET ORAL DAILY
Status: DISCONTINUED | OUTPATIENT
Start: 2021-10-17 | End: 2021-10-25 | Stop reason: HOSPADM

## 2021-10-17 RX ADMIN — HYDROCHLOROTHIAZIDE 12.5 MG: 25 TABLET ORAL at 09:34

## 2021-10-17 RX ADMIN — CLOPIDOGREL BISULFATE 75 MG: 75 TABLET ORAL at 09:34

## 2021-10-17 RX ADMIN — ASPIRIN 81 MG: 81 TABLET, COATED ORAL at 09:35

## 2021-10-17 RX ADMIN — CEFTRIAXONE SODIUM 1 G: 1 INJECTION, POWDER, FOR SOLUTION INTRAMUSCULAR; INTRAVENOUS at 17:45

## 2021-10-17 RX ADMIN — FAMOTIDINE 20 MG: 10 INJECTION, SOLUTION INTRAVENOUS at 09:35

## 2021-10-17 RX ADMIN — DEXTROSE AND SODIUM CHLORIDE 75 ML/HR: 5; 450 INJECTION, SOLUTION INTRAVENOUS at 17:44

## 2021-10-17 RX ADMIN — ATORVASTATIN CALCIUM 40 MG: 40 TABLET, FILM COATED ORAL at 09:34

## 2021-10-17 RX ADMIN — FAMOTIDINE 20 MG: 10 INJECTION, SOLUTION INTRAVENOUS at 20:59

## 2021-10-17 RX ADMIN — LISINOPRIL 10 MG: 10 TABLET ORAL at 09:34

## 2021-10-17 NOTE — PROGRESS NOTES
[address] [facility] Adult  Hospitalist Group                                                                                          Hospitalist Progress Note  Gisel Menard MD  Answering service: [phone] from in house phone        Date of Service:  10/17/2021  NAME:  Earnest Machuca  :  877  MRN:  [id number]      Admission Summary:   Earnest Machuca is a 66 y.o. female who presents with aphasia      Patient presented to [facility] with aphasia.     No history can be obtained from the patient, apparently patient went to bed in normal state, woke up today morning with complete aphasia and right-sided weakness, she was unable to speak or stand, came to the ER, was found to have acute CVA and was requested to be admitted to the hospitalist service. No further history could be obtained from the patient or chart review    Interval history / Subjective:    CC:  Nonverbal    Patient has had no events overnight. Daughter in room. Patient has been more sleepy today. Patient nonverbal.     Assessment & Plan:     Acute Left MCA ischemic stroke with right U/L weakness and aphasia -- not thrombectomy candidate as stroke completed at time of eval  - CTA:  possible occlusion of the anterior division of  the left anterior cerebral artery, with a relative paucity of vessels in the  left frontal lobe anterior middle cerebral artery territory  - MRI:  Moderate acute infarction of the anterior inferior left frontal lobe,  predominantly cortically based without significant superimposed hemorrhage, mass  Effect.  Mild chronic microvascular ischemic change and mild cerebral atrophy.  - ; Lipitor 40 mg daily  - Cards:  EKG NSR, echo not done, on tele  - continue ASA/Plavix; was on ASA alone PTA; platelet studies ordered  - rehab services  - appreciate neuro consult  - CM for DC planning    Pneumonia  - probably aspiration so will cover with CTX X 5 days  - will check Covid PCR with fever and pneumonia  - NPO except meds until seen by     Fever - prob due to pneumonia  - UA ordered as part of w/u and is pending    HTN  - Lisinopril 10mg PO daily  - added HCTZ 12.5mg today  - monitor    HLD  - will place on high intensity statin    Hypokalemia - prob due to HCTZ  - replace      Code status: Full  DVT prophylaxis: SCDs    Care Plan discussed with: Patient/Family; discussed with   Anticipated Disposition: SAH/Rehab vs SNF  Anticipated Discharge: 24 hours to 50 hours     Hospital Problems  Never Reviewed        Codes Class Noted POA    CVA (cerebral vascular accident) [facility]) ICD-10-CM: I63.9  ICD-9-CM: 434.91  10/15/2021 Unknown                Review of Systems:   Pertinent items are noted in HPI. Vital Signs:    Last 24hrs VS reviewed since prior progress note. Most recent are:  Visit Vitals  BP (!) 161/68 (BP 1 Location: Left arm, BP Patient Position: At rest)   Pulse 81   Temp 99.5 °F (37.5 °C)   Resp 25   Wt 125.7 kg (277 lb 1.9 oz)   SpO2 95%   BMI 43.40 kg/m²       No intake or output data in the 24 hours ending 10/17/21 1723     Physical Examination:             Constitutional:  No acute distress, mute, does not follow instructions       Resp:  CTA bilaterally. No wheezing/rhonchi/rales. No accessory muscle use   CV:  Regular rhythm, normal rate, no murmurs, gallops, rubs    GI:  Soft, non distended, non tender.  Protuberant normoactive bowel sounds, no hepatosplenomegaly     Musculoskeletal:  No edema, warm, 2+ pulses throughout    Neurologic:  RUE/RLE weakness, right facial weakness, aphasia            Data Review:    Review and/or order of clinical lab test      Labs:     Recent Labs     10/16/21  0025 10/15/21  1035   WBC 9.9 7.3   HGB 12.6 13.0   HCT 39.2 40.5    270     Recent Labs     10/16/21  0025 10/15/21  1035    143   K 3.4* 3.7    104   CO2 26 36*   BUN 20 30*   CREA 0.52* 0.72   * 128*   CA 8.7 8.8   MG 2.1  --    PHOS 3.0  -- Recent Labs     10/15/21  1035   ALT 22   AP 86   TBILI 0.5   TP 7.1   ALB 3.8   GLOB 3.3     Recent Labs     10/15/21  1035   INR 1.1   PTP 10.6      No results for input(s): FE, TIBC, PSAT, FERR in the last 72 hours. No results found for: FOL, RBCF   No results for input(s): PH, PCO2, PO2 in the last 72 hours.   Recent Labs     10/16/21  0025 10/15/21  1736   CPK 43 43     Lab Results   Component Value Date/Time    Cholesterol, total 162 10/16/2021 12:25 AM    HDL Cholesterol 39 10/16/2021 12:25 AM    LDL, calculated 102.6 (H) 10/16/2021 12:25 AM    Triglyceride 102 10/16/2021 12:25 AM    CHOL/HDL Ratio 4.2 10/16/2021 12:25 AM     Lab Results   Component Value Date/Time    Glucose (POC) 121 (H) 10/17/2021 04:53 PM    Glucose (POC) 124 (H) 10/17/2021 11:27 AM    Glucose (POC) 125 (H) 10/17/2021 08:00 AM    Glucose (POC) 110 10/16/2021 10:41 PM    Glucose (POC) 97 10/16/2021 04:15 PM     No results found for: COLOR, APPRN, SPGRU, REFSG, HARRISON, PROTU, GLUCU, KETU, BILU, UROU, ANAY, LEUKU, GLUKE, EPSU, BACTU, WBCU, RBCU, CASTS, UCRY      Medications Reviewed:     Current Facility-Administered Medications   Medication Dose Route Frequency    hydroCHLOROthiazide (HYDRODIURIL) tablet 12.5 mg  12.5 mg Oral DAILY    cefTRIAXone (ROCEPHIN) 1 g in sterile water (preservative free) 10 mL IV syringe  1 g IntraVENous Q24H    lisinopriL (PRINIVIL, ZESTRIL) tablet 10 mg  10 mg Oral DAILY    atorvastatin (LIPITOR) tablet 40 mg  40 mg Oral DAILY    aspirin delayed-release tablet 81 mg  81 mg Oral DAILY    acetaminophen (TYLENOL) tablet 650 mg  650 mg Oral Q4H PRN    Or    acetaminophen (TYLENOL) solution 650 mg  650 mg Per NG tube Q4H PRN    Or    acetaminophen (TYLENOL) suppository 650 mg  650 mg Rectal Q4H PRN    clopidogreL (PLAVIX) tablet 75 mg  75 mg Oral DAILY    famotidine (PF) (PEPCID) 20 mg in 0.9% sodium chloride 10 mL injection  20 mg IntraVENous Q12H ______________________________________________________________________  EXPECTED LENGTH OF STAY: - - -  ACTUAL LENGTH OF STAY:          2                 Melanie Ortega MD

## 2021-10-17 NOTE — PROGRESS NOTES
Problem: Patient Education: Go to Patient Education Activity  Goal: Patient/Family Education  Outcome: Progressing Towards Goal     Problem: TIA/CVA Stroke: 0-24 hours  Goal: Off Pathway (Use only if patient is Off Pathway)  Outcome: Progressing Towards Goal  Goal: Activity/Safety  Outcome: Progressing Towards Goal  Goal: Consults, if ordered  Outcome: Progressing Towards Goal  Goal: Diagnostic Test/Procedures  Outcome: Progressing Towards Goal  Goal: Nutrition/Diet  Outcome: Progressing Towards Goal  Goal: Discharge Planning  Outcome: Progressing Towards Goal  Goal: Medications  Outcome: Progressing Towards Goal  Goal: Respiratory  Outcome: Progressing Towards Goal  Goal: Treatments/Interventions/Procedures  Outcome: Progressing Towards Goal  Goal: Minimize risk of bleeding post-thrombolytic infusion  Outcome: Progressing Towards Goal  Goal: Monitor for complications post-thrombolytic infusion  Outcome: Progressing Towards Goal  Goal: Psychosocial  Outcome: Progressing Towards Goal  Goal: *Hemodynamically stable  Outcome: Progressing Towards Goal  Goal: *Neurologically stable  Description: Absence of additional neurological deficits    Outcome: Progressing Towards Goal  Goal: *Verbalizes anxiety and depression are reduced or absent  Outcome: Progressing Towards Goal  Goal: *Absence of Signs of Aspiration on Current Diet  Outcome: Progressing Towards Goal  Goal: *Absence of deep venous thrombosis signs and symptoms(Stroke Metric)  Outcome: Progressing Towards Goal  Goal: *Ability to perform ADLs and demonstrates progressive mobility and function  Outcome: Progressing Towards Goal  Goal: *Stroke education started(Stroke Metric)  Outcome: Progressing Towards Goal  Goal: *Dysphagia screen performed(Stroke Metric)  Outcome: Progressing Towards Goal  Goal: *Rehab consulted(Stroke Metric)  Outcome: Progressing Towards Goal     Problem: TIA/CVA Stroke: Day 2 Until Discharge  Goal: Off Pathway (Use only if patient is Off Pathway)  Outcome: Progressing Towards Goal  Goal: Activity/Safety  Outcome: Progressing Towards Goal  Goal: Diagnostic Test/Procedures  Outcome: Progressing Towards Goal  Goal: Nutrition/Diet  Outcome: Progressing Towards Goal  Goal: Discharge Planning  Outcome: Progressing Towards Goal  Goal: Medications  Outcome: Progressing Towards Goal  Goal: Respiratory  Outcome: Progressing Towards Goal  Goal: Treatments/Interventions/Procedures  Outcome: Progressing Towards Goal  Goal: Psychosocial  Outcome: Progressing Towards Goal  Goal: *Verbalizes anxiety and depression are reduced or absent  Outcome: Progressing Towards Goal  Goal: *Absence of aspiration  Outcome: Progressing Towards Goal  Goal: *Absence of deep venous thrombosis signs and symptoms(Stroke Metric)  Outcome: Progressing Towards Goal  Goal: *Optimal pain control at patient's stated goal  Outcome: Progressing Towards Goal  Goal: *Tolerating diet  Outcome: Progressing Towards Goal  Goal: *Ability to perform ADLs and demonstrates progressive mobility and function  Outcome: Progressing Towards Goal  Goal: *Stroke education continued(Stroke Metric)  Outcome: Progressing Towards Goal     Problem: Ischemic Stroke: Discharge Outcomes  Goal: *Verbalizes anxiety and depression are reduced or absent  Outcome: Progressing Towards Goal  Goal: *Verbalize understanding of risk factor modification(Stroke Metric)  Outcome: Progressing Towards Goal  Goal: *Hemodynamically stable  Outcome: Progressing Towards Goal  Goal: *Absence of aspiration pneumonia  Outcome: Progressing Towards Goal  Goal: *Aware of needed dietary changes  Outcome: Progressing Towards Goal  Goal: *Verbalize understanding of prescribed medications including anti-coagulants, anti-lipid, and/or anti-platelets(Stroke Metric)  Outcome: Progressing Towards Goal  Goal: *Tolerating diet  Outcome: Progressing Towards Goal  Goal: *Aware of follow-up diagnostics related to anticoagulants  Outcome: Progressing Towards Goal  Goal: *Ability to perform ADLs and demonstrates progressive mobility and function  Outcome: Progressing Towards Goal  Goal: *Absence of DVT(Stroke Metric)  Outcome: Progressing Towards Goal  Goal: *Absence of aspiration  Outcome: Progressing Towards Goal  Goal: *Optimal pain control at patient's stated goal  Outcome: Progressing Towards Goal  Goal: *Home safety concerns addressed  Outcome: Progressing Towards Goal  Goal: *Describes available resources and support systems  Outcome: Progressing Towards Goal  Goal: *Verbalizes understanding of activation of EMS(911) for stroke symptoms(Stroke Metric)  Outcome: Progressing Towards Goal  Goal: *Understands and describes signs and symptoms to report to providers(Stroke Metric)  Outcome: Progressing Towards Goal  Goal: *Neurolgocially stable (absence of additional neurological deficits)  Outcome: Progressing Towards Goal  Goal: *Verbalizes importance of follow-up with primary care physician(Stroke Metric)  Outcome: Progressing Towards Goal  Goal: *Smoking cessation discussed,if applicable(Stroke Metric)  Outcome: Progressing Towards Goal  Goal: *Depression screening completed(Stroke Metric)  Outcome: Progressing Towards Goal     Problem: Falls - Risk of  Goal: *Absence of Falls  Description: Document Rose Mary Fall Risk and appropriate interventions in the flowsheet.   Outcome: Progressing Towards Goal  Note: Fall Risk Interventions:       Mentation Interventions: Bed/chair exit alarm, Door open when patient unattended    Medication Interventions: Bed/chair exit alarm, Patient to call before getting OOB    Elimination Interventions: Bed/chair exit alarm, Call light in reach, Patient to call for help with toileting needs              Problem: Patient Education: Go to Patient Education Activity  Goal: Patient/Family Education  Outcome: Progressing Towards Goal     Problem: Patient Education: Go to Patient Education Activity  Goal: Patient/Family Education  Outcome: Progressing Towards Goal     Problem: Patient Education: Go to Patient Education Activity  Goal: Patient/Family Education  Outcome: Progressing Towards Goal

## 2021-10-17 NOTE — PROGRESS NOTES
A Spiritual Care Partner Volunteer visited patient in  653 on 10/17/2021.   Documented by:  Chaplain Smith MDiv, MS, [facility]

## 2021-10-17 NOTE — PROGRESS NOTES
Bedside shift change report given to Li Brown (oncoming nurse) by Fatou Mathew RN (offgoing nurse). Report included the following information SBAR, Kardex, ED Summary, Procedure Summary, Intake/Output, MAR, Cardiac Rhythm NSR, Alarm Parameters  and Dual Neuro Assessment.

## 2021-10-17 NOTE — PROGRESS NOTES
[address] [facility] Adult  Hospitalist Group                                                                                          Hospitalist Progress Note  Kevni Mccrary MD  Answering service: [phone] OR [phone] from in house phone        Date of Service:  10/17/2021  NAME:  Augie Msoher  :  2519  MRN:  [id number]      Admission Summary:   Augie Mosher is a 66 y.o. female who presents with aphasia      Patient presented to [facility] with aphasia.     No history can be obtained from the patient, apparently patient went to bed in normal state, woke up today morning with complete aphasia and right-sided weakness, she was unable to speak or stand, came to the ER, was found to have acute CVA and was requested to be admitted to the hospitalist service. No further history could be obtained from the patient or chart review    Interval history / Subjective:    CC:  Nonverbal    Patient has had no events overnight. Daughter in room. Patient has been more sleepy today. Patient nonverbal.     Assessment & Plan:     Acute Left MCA ischemic stroke with right U/L weakness and aphasia -- not thrombectomy candidate as stroke completed at time of eval  - CTA:  possible occlusion of the anterior division of  the left anterior cerebral artery, with a relative paucity of vessels in the  left frontal lobe anterior middle cerebral artery territory  - MRI:  Moderate acute infarction of the anterior inferior left frontal lobe,  predominantly cortically based without significant superimposed hemorrhage, mass  Effect.  Mild chronic microvascular ischemic change and mild cerebral atrophy.  - ; Lipitor 40 mg daily  - Cards:  EKG NSR, echo not done, on tele  - continue ASA/Plavix; was on ASA alone PTA; platelet studies ordered  - rehab services  - appreciate neuro consult  - CM for DC planning    Pneumonia  - probably aspiration so will cover with CTX X 5 days  - will check Covid PCR with fever and pneumonia      HTN  - Lisinopril 10mg PO daily  - added HCTZ 12.5mg today  - monitor    HLD  - will place on high intensity statin    Hypokalemia - prob due to HCTZ  - replace      Code status: Full  DVT prophylaxis: SCDs    Care Plan discussed with: Patient/Family; discussed with   Anticipated Disposition: SAH/Rehab vs SNF  Anticipated Discharge: 24 hours to 48 hours     Hospital Problems  Never Reviewed        Codes Class Noted POA    CVA (cerebral vascular accident) [facility]) ICD-10-CM: I63.9  ICD-9-CM: 434.91  10/15/2021 Unknown                Review of Systems:   Pertinent items are noted in HPI. Vital Signs:    Last 24hrs VS reviewed since prior progress note. Most recent are:  Visit Vitals  BP (!) 161/68 (BP 1 Location: Left arm, BP Patient Position: At rest)   Pulse 81   Temp 99.5 °F (37.5 °C)   Resp 25   Wt 125.7 kg (277 lb 1.9 oz)   SpO2 95%   BMI 43.40 kg/m²       No intake or output data in the 24 hours ending 10/17/21 8973     Physical Examination:             Constitutional:  No acute distress, mute, does not follow instructions       Resp:  CTA bilaterally. No wheezing/rhonchi/rales. No accessory muscle use   CV:  Regular rhythm, normal rate, no murmurs, gallops, rubs    GI:  Soft, non distended, non tender.  Protuberant normoactive bowel sounds, no hepatosplenomegaly     Musculoskeletal:  No edema, warm, 2+ pulses throughout    Neurologic:  RUE/RLE weakness, right facial weakness, aphasia            Data Review:    Review and/or order of clinical lab test      Labs:     Recent Labs     10/16/21  0025 10/15/21  1035   WBC 9.9 7.3   HGB 12.6 13.0   HCT 39.2 40.5    270     Recent Labs     10/16/21  0025 10/15/21  1035    143   K 3.4* 3.7    104   CO2 26 36*   BUN 20 30*   CREA 0.52* 0.72   * 128*   CA 8.7 8.8   MG 2.1  --    PHOS 3.0  --      Recent Labs     10/15/21  1035   ALT 22   AP 86   TBILI 0.5   TP 7.1   ALB 3.8   GLOB 3.3     Recent Labs 10/15/21  1035   INR 1.1   PTP 10.6      No results for input(s): FE, TIBC, PSAT, FERR in the last 72 hours. No results found for: FOL, RBCF   No results for input(s): PH, PCO2, PO2 in the last 72 hours.   Recent Labs     10/16/21  0025 10/15/21  1736   CPK 43 43     Lab Results   Component Value Date/Time    Cholesterol, total 162 10/16/2021 12:25 AM    HDL Cholesterol 39 10/16/2021 12:25 AM    LDL, calculated 102.6 (H) 10/16/2021 12:25 AM    Triglyceride 102 10/16/2021 12:25 AM    CHOL/HDL Ratio 4.2 10/16/2021 12:25 AM     Lab Results   Component Value Date/Time    Glucose (POC) 121 (H) 10/17/2021 04:53 PM    Glucose (POC) 124 (H) 10/17/2021 11:27 AM    Glucose (POC) 125 (H) 10/17/2021 08:00 AM    Glucose (POC) 110 10/16/2021 10:41 PM    Glucose (POC) 97 10/16/2021 04:15 PM     No results found for: COLOR, APPRN, SPGRU, REFSG, HARRISON, PROTU, GLUCU, KETU, BILU, UROU, ANAY, LEUKU, GLUKE, EPSU, BACTU, WBCU, RBCU, CASTS, UCRY      Medications Reviewed:     Current Facility-Administered Medications   Medication Dose Route Frequency    hydroCHLOROthiazide (HYDRODIURIL) tablet 12.5 mg  12.5 mg Oral DAILY    lisinopriL (PRINIVIL, ZESTRIL) tablet 10 mg  10 mg Oral DAILY    atorvastatin (LIPITOR) tablet 40 mg  40 mg Oral DAILY    aspirin delayed-release tablet 81 mg  81 mg Oral DAILY    acetaminophen (TYLENOL) tablet 650 mg  650 mg Oral Q4H PRN    Or    acetaminophen (TYLENOL) solution 650 mg  650 mg Per NG tube Q4H PRN    Or    acetaminophen (TYLENOL) suppository 650 mg  650 mg Rectal Q4H PRN    clopidogreL (PLAVIX) tablet 75 mg  75 mg Oral DAILY    famotidine (PF) (PEPCID) 20 mg in 0.9% sodium chloride 10 mL injection  20 mg IntraVENous Q12H     ______________________________________________________________________  EXPECTED LENGTH OF STAY: - - -  ACTUAL LENGTH OF STAY:          2                 Lissette Griffin MD

## 2021-10-17 NOTE — PROGRESS NOTES
Bedside shift change report given to [address] (oncoming nurse) by Kelley Gibbons RN (offgoing nurse). Report included the following information SBAR, Kardex, MAR, Recent Results and Quality Measures.

## 2021-10-18 ENCOUNTER — APPOINTMENT (OUTPATIENT)
Dept: GENERAL RADIOLOGY | Age: 71
DRG: 064 | End: 2021-10-18
Attending: NURSE PRACTITIONER
Payer: MEDICARE

## 2021-10-18 ENCOUNTER — APPOINTMENT (OUTPATIENT)
Dept: NON INVASIVE DIAGNOSTICS | Age: 71
DRG: 064 | End: 2021-10-18
Attending: FAMILY MEDICINE
Payer: MEDICARE

## 2021-10-18 LAB
APPEARANCE UR: ABNORMAL
BACTERIA URNS QL MICRO: ABNORMAL /HPF
BASOPHILS # BLD: 0 K/UL (ref 0–0.1)
BASOPHILS NFR BLD: 0 % (ref 0–1)
BILIRUB UR QL: NEGATIVE
COLOR UR: ABNORMAL
DIFFERENTIAL METHOD BLD: ABNORMAL
EOSINOPHIL # BLD: 0 K/UL (ref 0–0.4)
EOSINOPHIL NFR BLD: 0 % (ref 0–7)
EPITH CASTS URNS QL MICRO: ABNORMAL /LPF
ERYTHROCYTE [DISTWIDTH] IN BLOOD BY AUTOMATED COUNT: 13.2 % (ref 11.5–14.5)
GLUCOSE BLD STRIP.AUTO-MCNC: 142 MG/DL (ref 65–117)
GLUCOSE BLD STRIP.AUTO-MCNC: 155 MG/DL (ref 65–117)
GLUCOSE BLD STRIP.AUTO-MCNC: 158 MG/DL (ref 65–117)
GLUCOSE UR STRIP.AUTO-MCNC: 100 MG/DL
HCT VFR BLD AUTO: 44 % (ref 35–47)
HGB BLD-MCNC: 14.5 G/DL (ref 11.5–16)
HGB UR QL STRIP: ABNORMAL
IMM GRANULOCYTES # BLD AUTO: 0.1 K/UL (ref 0–0.04)
IMM GRANULOCYTES NFR BLD AUTO: 1 % (ref 0–0.5)
KETONES UR QL STRIP.AUTO: NEGATIVE MG/DL
LEUKOCYTE ESTERASE UR QL STRIP.AUTO: ABNORMAL
LYMPHOCYTES # BLD: 1.1 K/UL (ref 0.8–3.5)
LYMPHOCYTES NFR BLD: 7 % (ref 12–49)
MCH RBC QN AUTO: 30.9 PG (ref 26–34)
MCHC RBC AUTO-ENTMCNC: 33 G/DL (ref 30–36.5)
MCV RBC AUTO: 93.8 FL (ref 80–99)
MONOCYTES # BLD: 1.4 K/UL (ref 0–1)
MONOCYTES NFR BLD: 8 % (ref 5–13)
NEUTS SEG # BLD: 13.8 K/UL (ref 1.8–8)
NEUTS SEG NFR BLD: 84 % (ref 32–75)
NITRITE UR QL STRIP.AUTO: POSITIVE
NRBC # BLD: 0 K/UL (ref 0–0.01)
NRBC BLD-RTO: 0 PER 100 WBC
PH UR STRIP: 5.5 [PH] (ref 5–8)
PLATELET # BLD AUTO: 277 K/UL (ref 150–400)
PMV BLD AUTO: 10 FL (ref 8.9–12.9)
PROCALCITONIN SERPL-MCNC: <0.05 NG/ML
PROT UR STRIP-MCNC: ABNORMAL MG/DL
RBC # BLD AUTO: 4.69 M/UL (ref 3.8–5.2)
RBC #/AREA URNS HPF: ABNORMAL /HPF (ref 0–5)
SERVICE CMNT-IMP: ABNORMAL
SP GR UR REFRACTOMETRY: 1.02 (ref 1–1.03)
UA: UC IF INDICATED,UAUC: ABNORMAL
UROBILINOGEN UR QL STRIP.AUTO: 0.2 EU/DL (ref 0.2–1)
WBC # BLD AUTO: 16.4 K/UL (ref 3.6–11)
WBC URNS QL MICRO: ABNORMAL /HPF (ref 0–4)

## 2021-10-18 PROCEDURE — 74011250636 HC RX REV CODE- 250/636: Performed by: NURSE PRACTITIONER

## 2021-10-18 PROCEDURE — 71045 X-RAY EXAM CHEST 1 VIEW: CPT

## 2021-10-18 PROCEDURE — 74011000250 HC RX REV CODE- 250: Performed by: FAMILY MEDICINE

## 2021-10-18 PROCEDURE — 97110 THERAPEUTIC EXERCISES: CPT

## 2021-10-18 PROCEDURE — 87077 CULTURE AEROBIC IDENTIFY: CPT

## 2021-10-18 PROCEDURE — 87086 URINE CULTURE/COLONY COUNT: CPT

## 2021-10-18 PROCEDURE — 82962 GLUCOSE BLOOD TEST: CPT

## 2021-10-18 PROCEDURE — 97530 THERAPEUTIC ACTIVITIES: CPT

## 2021-10-18 PROCEDURE — 85025 COMPLETE CBC W/AUTO DIFF WBC: CPT

## 2021-10-18 PROCEDURE — 92522 EVALUATE SPEECH PRODUCTION: CPT

## 2021-10-18 PROCEDURE — 74011250636 HC RX REV CODE- 250/636: Performed by: FAMILY MEDICINE

## 2021-10-18 PROCEDURE — 74011250636 HC RX REV CODE- 250/636: Performed by: INTERNAL MEDICINE

## 2021-10-18 PROCEDURE — 92610 EVALUATE SWALLOWING FUNCTION: CPT

## 2021-10-18 PROCEDURE — 81001 URINALYSIS AUTO W/SCOPE: CPT

## 2021-10-18 PROCEDURE — 65660000000 HC RM CCU STEPDOWN

## 2021-10-18 PROCEDURE — 74011000250 HC RX REV CODE- 250: Performed by: INTERNAL MEDICINE

## 2021-10-18 PROCEDURE — 36415 COLL VENOUS BLD VENIPUNCTURE: CPT

## 2021-10-18 PROCEDURE — 87040 BLOOD CULTURE FOR BACTERIA: CPT

## 2021-10-18 PROCEDURE — 74011250637 HC RX REV CODE- 250/637: Performed by: INTERNAL MEDICINE

## 2021-10-18 PROCEDURE — 84145 PROCALCITONIN (PCT): CPT

## 2021-10-18 PROCEDURE — 74011250637 HC RX REV CODE- 250/637: Performed by: FAMILY MEDICINE

## 2021-10-18 PROCEDURE — 87186 SC STD MICRODIL/AGAR DIL: CPT

## 2021-10-18 RX ORDER — METRONIDAZOLE 500 MG/100ML
500 INJECTION, SOLUTION INTRAVENOUS EVERY 12 HOURS
Status: DISCONTINUED | OUTPATIENT
Start: 2021-10-18 | End: 2021-10-22

## 2021-10-18 RX ADMIN — FAMOTIDINE 20 MG: 10 INJECTION, SOLUTION INTRAVENOUS at 09:55

## 2021-10-18 RX ADMIN — DEXTROSE AND SODIUM CHLORIDE 75 ML/HR: 5; 450 INJECTION, SOLUTION INTRAVENOUS at 10:24

## 2021-10-18 RX ADMIN — FAMOTIDINE 20 MG: 10 INJECTION, SOLUTION INTRAVENOUS at 21:20

## 2021-10-18 RX ADMIN — METRONIDAZOLE 500 MG: 500 INJECTION, SOLUTION INTRAVENOUS at 21:24

## 2021-10-18 RX ADMIN — CLOPIDOGREL BISULFATE 75 MG: 75 TABLET ORAL at 09:55

## 2021-10-18 RX ADMIN — ATORVASTATIN CALCIUM 40 MG: 40 TABLET, FILM COATED ORAL at 10:01

## 2021-10-18 RX ADMIN — LISINOPRIL 10 MG: 10 TABLET ORAL at 10:01

## 2021-10-18 RX ADMIN — HYDROCHLOROTHIAZIDE 12.5 MG: 25 TABLET ORAL at 10:01

## 2021-10-18 RX ADMIN — CEFTRIAXONE SODIUM 1 G: 1 INJECTION, POWDER, FOR SOLUTION INTRAMUSCULAR; INTRAVENOUS at 17:17

## 2021-10-18 RX ADMIN — ACETAMINOPHEN 650 MG: 650 SUPPOSITORY RECTAL at 21:26

## 2021-10-18 RX ADMIN — ASPIRIN 81 MG: 81 TABLET, COATED ORAL at 10:01

## 2021-10-18 RX ADMIN — METRONIDAZOLE 500 MG: 500 INJECTION, SOLUTION INTRAVENOUS at 10:02

## 2021-10-18 NOTE — PROGRESS NOTES
[facility] Adult  Hospitalist Group                                                                                          Hospitalist Progress Note  Carmen Jarrett NP  Answering service: [phone] from in house phone        Date of Service:  10/18/2021  NAME:  Sandra Irving  :    MRN:  [id number]      Admission Summary:   Per medical record, Sandra Irving is a 66 y.o. female who presents with aphasia    Patient presented to [facility] with aphasia.   No history can be obtained from the patient, apparently patient went to bed in normal state, woke up today morning with complete aphasia and right-sided weakness, she was unable to speak or stand, came to the ER, was found to have acute CVA and was requested to be admitted to the hospitalist service. No further history could be obtained from the patient or chart review    Interval history / Subjective:   Patient seen on morning rounds. Able to indicate yes/no to simple questions. Denies pain, discomfort however does answer yes regarding cough. Patient was febrile 38.2 degrees overnight     Assessment & Plan:     Acute Left MCA ischemic stroke with right U/L weakness and aphasia -- not thrombectomy candidate as stroke completed at time of eval  CTA:  possible occlusion of the anterior division of  the left anterior cerebral artery, with a relative paucity of vessels in the  left frontal lobe anterior middle cerebral artery territory  MRI:  Moderate acute infarction of the anterior inferior left frontal lobe,  predominantly cortically based without significant superimposed hemorrhage, mass  Effect. Mild chronic microvascular ischemic change and mild cerebral atrophy.   Continuing atorvastatin  - Cards:  EKG NSR, echo not done, on tele  Continue aspirin  Continuing clopidogrel  Neurology following, thank you recommendations  Physical therapy recommending SNF versus IPR  Working with OT  SLP to evaluate      Pneumonia  In setting of aphasia, concern for aspiration  COVID-19 PCR was negative  Patient  febrile overnight  Continuing ceftriaxone  Adding metronidazole to cover for aspiration  SLP   N.p.o. except meds for now          HTN  Blood pressure currently controlled  Continuing hydrochlorothiazide  Continuing lisinopril  We will continue to monitor      HLD    Continuing atorvastatin    Hypokalemia  Has been repleted  Has not had labs in a couple of days, will check labs in the morning          Code status: Full    DVT prophylaxis: SCDs    Care Plan discussed with: Patient/Family     Anticipated Disposition: [facility]H/Rehab     Anticipated Discharge: 24 hours to 48 hours     Hospital Problems  Never Reviewed        Codes Class Noted POA    * (Principal) CVA (cerebral vascular accident) [facility]) ICD-10-CM: I63.9  ICD-9-CM: 434.91  10/15/2021 Unknown                Review of Systems:   Pertinent items are noted in HPI. Vital Signs:    Last 24hrs VS reviewed since prior progress note. Most recent are:  Visit Vitals  BP (!) 157/62 (BP 1 Location: Right upper arm, BP Patient Position: At rest)   Pulse 77   Temp (!) 100.7 °F (38.2 °C)   Resp 22   Wt 124.4 kg (274 lb 4 oz)   SpO2 93%   BMI 42.95 kg/m²       No intake or output data in the 24 hours ending 10/18/21 0955     Physical Examination:             Constitutional:  No acute distress, mute, does not follow instructions       Resp:  CTA bilaterally. No wheezing/rhonchi/rales. No accessory muscle use   CV:  Regular rhythm, normal rate, no murmurs, gallops, rubs    GI:  Soft, non distended, non tender.  Protuberant normoactive bowel sounds, no hepatosplenomegaly     Musculoskeletal:  No edema, warm, 2+ pulses throughout    Neurologic:  Right-sided hemiplegia, right facial weakness, aphasia            Data Review:    Review and/or order of clinical lab test      Labs:     Recent Labs     10/16/21  0025 10/15/21  1035   WBC 9.9 7.3   HGB 12.6 13.0   HCT 39.2 40.5    270     Recent Labs     10/16/21  0025 10/15/21  1035    143   K 3.4* 3.7    104   CO2 26 36*   BUN 20 30*   CREA 0.52* 0.72   * 128*   CA 8.7 8.8   MG 2.1  --    PHOS 3.0  --      Recent Labs     10/15/21  1035   ALT 22   AP 86   TBILI 0.5   TP 7.1   ALB 3.8   GLOB 3.3     Recent Labs     10/15/21  1035   INR 1.1   PTP 10.6      No results for input(s): FE, TIBC, PSAT, FERR in the last 72 hours. No results found for: FOL, RBCF   No results for input(s): PH, PCO2, PO2 in the last 72 hours.   Recent Labs     10/16/21  0025 10/15/21  1736   CPK 43 43     Lab Results   Component Value Date/Time    Cholesterol, total 162 10/16/2021 12:25 AM    HDL Cholesterol 39 10/16/2021 12:25 AM    LDL, calculated 102.6 (H) 10/16/2021 12:25 AM    Triglyceride 102 10/16/2021 12:25 AM    CHOL/HDL Ratio 4.2 10/16/2021 12:25 AM     Lab Results   Component Value Date/Time    Glucose (POC) 158 (H) 10/18/2021 05:47 AM    Glucose (POC) 148 (H) 10/17/2021 09:54 PM    Glucose (POC) 121 (H) 10/17/2021 04:53 PM    Glucose (POC) 124 (H) 10/17/2021 11:27 AM    Glucose (POC) 125 (H) 10/17/2021 08:00 AM     No results found for: COLOR, APPRN, SPGRU, REFSG, HARRISON, PROTU, GLUCU, KETU, BILU, UROU, ANAY, LEUKU, GLUKE, EPSU, BACTU, WBCU, RBCU, CASTS, UCRY      Medications Reviewed:     Current Facility-Administered Medications   Medication Dose Route Frequency    metroNIDAZOLE (FLAGYL) IVPB premix 500 mg  500 mg IntraVENous Q12H    hydroCHLOROthiazide (HYDRODIURIL) tablet 12.5 mg  12.5 mg Oral DAILY    cefTRIAXone (ROCEPHIN) 1 g in sterile water (preservative free) 10 mL IV syringe  1 g IntraVENous Q24H    dextrose 5 % - 0.45% NaCl infusion  75 mL/hr IntraVENous CONTINUOUS    lisinopriL (PRINIVIL, ZESTRIL) tablet 10 mg  10 mg Oral DAILY    atorvastatin (LIPITOR) tablet 40 mg  40 mg Oral DAILY    aspirin delayed-release tablet 81 mg  81 mg Oral DAILY    acetaminophen (TYLENOL) tablet 650 mg  650 mg Oral Q4H PRN    Or    acetaminophen (TYLENOL) solution 650 mg  650 mg Per NG tube Q4H PRN    Or    acetaminophen (TYLENOL) suppository 650 mg  650 mg Rectal Q4H PRN    clopidogreL (PLAVIX) tablet 75 mg  75 mg Oral DAILY    famotidine (PF) (PEPCID) 20 mg in 0.9% sodium chloride 10 mL injection  20 mg IntraVENous Q12H     ______________________________________________________________________  EXPECTED LENGTH OF STAY: - - -  ACTUAL LENGTH OF STAY:          3                 Sekou Veronica NP

## 2021-10-18 NOTE — PROGRESS NOTES
Occupational Therapy: defer    Chart reviewed, consulted with physical therapy assistant, who just worked with patient and reported patient was very lethargic, with no command following, no active movement, and insufficient alertness to actively participate. Will defer OT at this time and will f/u as able and appropriate.     Brayan Corbin, OTR/L

## 2021-10-18 NOTE — PROGRESS NOTES
Problem: Dysphagia (Adult)  Goal: *Acute Goals and Plan of Care (Insert Text)  Description: Speech Pathology Goals    Initiated 10/18/2021  1. Patient will tolerate ice chips with no adverse effects within 7 days. 2. Patient will participate in swallow reassessment within 7 days. Outcome: Progressing Towards Goal     Problem: Communication Impaired (Adult)  Goal: *Acute Goals and Plan of Care (Insert Text)  Description: Speech Pathology Goals    Initiated 10/18/2021  1. Patient will respond to simple Yes/No questions with 80% accuracy within 7 days  2. Patient will follow simple one step commands with 80% accuracy within 7 days. 3. Patient will name simple objects of ADL with 80% accuracy given mod cues within 7 days. Outcome: Progressing Towards Goal     SPEECH LANGUAGE PATHOLOGY BEDSIDE SWALLOW AND SPEECH EVALUATIONS  Patient: Silvia Segal (60 y.o. female)  Date: 10/18/2021  Primary Diagnosis: CVA (cerebral vascular accident) [facility]) [I63.9]        Precautions:   Fall, Skin    ASSESSMENT :  Based on the objective data described below, the patient presents with moderately severe oropharyngeal dysphagia. Patient with limited acceptance of PO trials 2/2 drowsiness and limited command following due to receptive language deficits. Patient with increased WOB, RR in high 20's and O2 sats at 92 upon SLP entry. Patient accepted ice chip, however then held ice chip in mouth for extended period of time requiring repeated cues to initiate a swallow. With teaspoons of water pt with more timely swallow initiation however then with throat clearing and increase in RR. No s/s of aspiration with puree trial, however pt with prolonged manipulation and posterior propulsion. Given bedside presentation, suspect pt is at elevated risk for prandial aspiration. Therefore, recommend pt remain NPO with alternate means for nutrition/hydration. Recommend crush meds in applesauce as able.  Suspect pt may benefit from imaging to objectively assess safety of the swallow, however given lethargy, increased WOB/RR, and limited acceptance do not suspect pt is appropriate for it this AM. SLP will continue to follow for swallow reassessment. Patient also presents with moderately-severe mixed receptive/expressive aphasia. Receptive language characterized by inconsistent simple command following and responses to Y/N questions. Pt responding \"mhmm\" or \"yeah\" to all simple yes/no questions asked. Expressive language characterized by limited verbal output, suspect that receptive language deficits are contributing to this. When pt asked to say her name or name any objects she continues to respond with \"mhmm\" (yes). Patient presents as non-functional communicator at this time and will benefit from SLP intervention to improve receptive/expressive language to express basic wants/needs, access her environment and for safety needs. SLP to follow for language reassessment and intervention. Patient will benefit from skilled intervention to address the above impairments. Patients rehabilitation potential is considered to be Fair     PLAN :  Recommendations and Planned Interventions:  -- NPO   -- meds crushed in applesauce as able  -- provide cues to completely clear bolus from mouth   -- ice chips following oral care for comfort and to work against swallow muscle atrophy    Frequency/Duration: Patient will be followed by speech-language pathology 3 times a week to address goals. Discharge Recommendations: To Be Determined     SUBJECTIVE:   Patient stated mhm when asked if she was in a hotel. Pt responded \"mhm\" again when asked if she was in a hospital, or at home. OBJECTIVE:   No past medical history on file. No past surgical history on file.   Prior Level of Function/Home Situation:   Home Situation  Support Systems: Spouse/Significant Other  Diet prior to admission: presumed regular/thin  Current Diet:  NPO   Cognitive and Communication Status:  Neurologic State: Confused, Drowsy  Orientation Level: Other (Comment) (unable to fully assess 2/2 aphasia)  Cognition: Decreased command following  Perception: Cues to attend to right side of body  Perseveration: No perseveration noted (miimal verbalizations)  Safety/Judgement: Decreased awareness of environment, Decreased awareness of need for assistance, Decreased awareness of need for safety  Swallowing Evaluation:   Oral Assessment:  Oral Assessment  Labial: Right droop; Decreased rate;Decreased seal;Impaired coordination  Dentition: Intact; Natural  Oral Hygiene: moist oral mucosa - some excess secretions noted  Lingual: Decreased rate;Decreased strength  Velum: Unable to visualize  Mandible: No impairment  P.O. Trials:  Patient Position: upright in bed  Vocal quality prior to P.O.: Low volume  Consistency Presented: Thin liquid; Ice chips;Puree  How Presented: SLP-fed/presented;Spoon;Straw     Bolus Acceptance: Impaired  Bolus Formation/Control: Impaired  Type of Impairment: Delayed; Incomplete;Poor;Lip closure  Propulsion: Delayed (# of seconds); Discoordination  Oral Residue: 10-50% of bolus  Initiation of Swallow: Delayed (# of seconds)  Laryngeal Elevation: Decreased  Aspiration Signs/Symptoms: Clear throat; Increase in RR  Pharyngeal Phase Characteristics: Poor endurance;Multiple swallows  Effective Modifications: None          Oral Phase Severity: Moderate  Pharyngeal Phase Severity :  (at risk)    NOMS:   The NOMS functional outcome measure was used to quantify this patient's level of swallowing impairment. Based on the NOMS, the patient was determined to be at level 1 for swallow function       NOMS Swallowing Levels:  Level 1 (CN): NPO  Level 2 (CM): NPO but takes consistency in therapy  Level 3 (CL): Takes less than 50% of nutrition p.o. and continues with nonoral feedings; and/or safe with mod cues; and/or max diet restriction  Level 4 (CK):  Safe swallow but needs mod cues; and/or mod diet restriction; and/or still requires some nonoral feeding/supplements  Level 5 (CJ): Safe swallow with min diet restriction; and/or needs min cues  Level 6 (CI): Independent with p.o.; rare cues; usually self cues; may need to avoid some foods or needs extra time  Level 7 ([address] [place] [address]): Independent for all p.o.  GLORIA. (2003). National Outcomes Measurement System (NOMS): Adult Speech-Language Pathology User's Guide. Speech/Language Evaluation  Motor Speech:  Oral-Motor Structure/Motor Speech  Labial: Right droop; Decreased rate;Decreased seal;Impaired coordination  Dentition: Intact; Natural  Oral Hygiene: moist oral mucosa - some excess secretions noted  Lingual: Decreased rate;Decreased strength  Velum: Unable to visualize  Mandible: No impairment  Language Comprehension and Expression:  Auditory Comprehension  Auditory Impairment: Yes  Response to Basic Yes/No Questions (%): 50 % (responds \"yeah\" or \"mhmm\" to all questions)  One-Step Basic Commands (%): 75 %  Interfering Components: Attention - sustained;Processing speed  Effective Techniques: Repetition;Stressing words;Visual/Gestural cues  Cueing type: Multi modality  Cueing amount: Maximum  Verbal Expression  Primary Mode of Expression: Verbal  Initiation: No impairment  Automatic Speech Task: Impaired (comment)  Repetition: Impaired  Naming: Impaired  Conversation: Other (comment) (no verbalization attempts)  Speech Characteristics: Other (comment) (unable to fully assess 2/2 receptive lanaguge deficits)  Interfering Components: Attention; Impaired thought organization;Limited error awareness  Overall Impairment: Severe       Voice:                 Vocal Quality: Low volume                                 NOMS: The NOMS functional outcome measure was used to quantify this patient's level of receptive language impairment. Based on the NOMS, the patient was determined to be at level 2 for spoken language comprehension.          NOMS Spoken Language Comprehension:  Level 1 (CN): Unable to follow directions or respond to simple yes/no  Level 2 (CM): Can follow directions & respond to simple y/n in context with max cues. Level 3 (CL): Responds to simple y/n; follows simple commands w/mod cues out of context  Level 4 (CK): Responds to y/n; occasionally follows simple directions without cues. Mod cues for complex info. Understands limited conversation about routine activities with familiar communication partner  Level 5 (Kyler Molina): Understands communication in structured conversation with familiar and unfamiliar partners. Min cues for complex information. Occasionally initiates use of compensatory strategies  Level 6 (CI): Limitations still apparent in voc/social situations. Rarely cued for complex info. Level 7 (CH): Independently participates in vocational, avocational, and social situations. GLORIA. (2003). National Outcomes Measurement System (NOMS): Adult Speech-Language Pathology User's Guide. Pain:  Pain Scale 1: Numeric (0 - 10)  Pain Intensity 1: 0       After treatment:   Patient left in no apparent distress in bed, Call bell within reach, and Nursing notified    COMMUNICATION/EDUCATION:   Patient was educated regarding her deficit(s) of aphasia/dysphagia as this relates to her diagnosis of CVA. She demonstrated Guarded understanding as evidenced by receptive language deficits. The patient's plan of care including recommendations, planned interventions, and recommended diet changes were discussed with: Registered nurse. Patient/family have participated as able in goal setting and plan of care. Patient/family agree to work toward stated goals and plan of care.     Thank you for this referral.  Harvey Parisi M.S. CF-SLP   Speech Language Pathologist     Time Calculation: 16 mins

## 2021-10-18 NOTE — PROGRESS NOTES
Clinical Pharmacy Note: Metronidazole Dosing    Please note that the metronidazole dose for Braxton Barry has been changed to 500 mg IV q12h per [facility]-approved protocol. Please contact the pharmacy with any questions.     Irma Robeldo

## 2021-10-18 NOTE — PROGRESS NOTES
I received notification that patient had fever of 101.6 degrees. This is concerning especially as patient has been on ceftriaxone with addition of metronidazole today  Discussed this with the nurse by telephone.   We'll check another chest radiograph  We'll check blood cultures and urine  Tylenol for fever      Isrealalla Wolf DNP

## 2021-10-18 NOTE — PROGRESS NOTES
Transition of Care Plan   RUR- Low  7%   DISPOSITION: SNF - Referrals pending    F/U with PCP/Specialist     Transport: [facility]      Reason for Admission:  CVA                     RUR Score:          7%           Plan for utilizing home health:    PT/OT recommending IPR vs SNF      PCP: First and Last name:  None - spouse could not verify PCP name     [facility] of [facility]: [facility]    Are you a current patient: Yes/No:    Approximate date of last visit:    Can you participate in a virtual visit with your PCP:                     Current Advanced Directive/Advance Care Plan: Full Code      Healthcare Decision Maker: Esme Saldaña is patient's spouse  Click here to complete [address] including selection of the Healthcare Decision Maker Relationship (ie \"Primary\")                             Transition of Care Plan:                      CM completed patient's initial assessment with patient's spouse, Alexa Cai due to patient's aphasia . Living situation: lives with spouse in 2 story home  ADLs: independent at baseline  DME: ambulates independently; owns cane but has not used in 5+ years  Previous IPR/SNF: none  Previous home health: Yes, unable to recall name, has completed OP pT/OT  Demographics: confirmed   Main point of contact: spouse, Alexa Cai [phone]    PT/OT recommendations IPR vs SNF pending progress and PLOF. Patient's spouse would like patient to go to facility near his home. [place] [facility] [address] has IPR, message left for [place] in Admissions. Spouse plans to spend day at the bedside today, CM plan to follow up after recommendations are clarified and discuss choice. CM to follow patient progress and assist as recommended with [facility] plan. 10:42pm: CM received return call from Shanell at [facility] [address], this is a SNF not IPR. They have SP 4 days a week. Closest IPR to patient would be [facility].        12:20pm: Transition of Care Plan: PT/OT and NP, Edmundo Canada, recommending SNF at discharge. The Plan for Transition of Care is related to the following treatment goals: SNF - 1st choice: [facility]. Other [facility] [address], [facility], and [facility]. Referrals sent in [employer], CM plan to follow up this afternoon. The Patient and/or patient representative Tyrone Genao \"Jonny\" Hong Galvez, was provided with a choice of provider and agrees  with the discharge plan. Yes [x] No []    A Freedom of choice list was provided with basic dialogue that supports the patient's individualized plan of care/goals and shares the quality data associated with the providers. Yes [x] No []    1:34pm: CM left message for Niko Cutler in Admissions with [facility]. Referral scanned to [email or web address]    3:48pm: CM left message for Niko Cutler in Admissions with [facility] and followed up via email. 4:17pm: [facility] does not have bed available in next 48 hours. Will update SNF if patient is here past 48 hours. CM scanned referrals to [email or web address] in Admissions with [address] [place] [address] [facility] and [email or web address] [email or web address] in Admissions with [facility]. CM spoke with Ghassan Newton in Admissions with [facility], they do have bed available pending patient is medically accepted. Will need rapid covid test within 24 hours of DC. CM informed patient's spouse, who remains hopeful patient will be able to get into [facility]. Care Management Interventions  PCP Verified by CM:  Yes  Palliative Care Criteria Met (RRAT>21 & CHF Dx)?: No  Mode of Transport at Discharge: BLS  Transition of Care Consult (CM Consult): Discharge Planning  MyChart Signup: Yes  Discharge Durable Medical Equipment: No  Health Maintenance Reviewed: Yes  Physical Therapy Consult: Yes  Occupational Therapy Consult: Yes  Speech Therapy Consult: Yes  Support Systems: Spouse/Significant Other  Confirm Follow Up Transport: Other (see comment) (likely BLS)  Discharge Location  Discharge Placement: Rehab hospital/unit acute    Medicare pt has received, reviewed, and signed 1st IM letter informing them of their right to appeal the discharge. Signed copy has been placed on pt bedside chart. Constanza Mckinley) BILL Melendez.

## 2021-10-18 NOTE — PROGRESS NOTES
Problem: Mobility Impaired (Adult and Pediatric)  Goal: *Acute Goals and Plan of Care (Insert Text)  10/18/2021 1024 by MeansDay  Note:   Outcome: Progressing Towards Goal  Note: FUNCTIONAL STATUS PRIOR TO ADMISSION:  Per documentation, it is inferred patient was independent PLOF however patient is aphasic and unable to provide medical history     HOME SUPPORT PRIOR TO ADMISSION: The patient lived with . Physical Therapy Goals  Initiated 10/16/2021  1. Patient will move from supine to sit and sit to supine , scoot up and down, and roll side to side in bed with moderate assistance  within 7 day(s). 2.  Patient will transfer from bed to chair and chair to bed with maximal assistance using the least restrictive device within 7 day(s). 3.  Patient will perform sit to stand with maximal assistance within 7 day(s). 10/18/2021 1024 by Means, Day LUNA  Outcome: Progressing Towards Goal  PHYSICAL THERAPY TREATMENT  Patient: Chito Burton (54 y.o. female)  Date: 10/18/2021  Diagnosis: CVA (cerebral vascular accident) [facility]) [I63.9] CVA (cerebral vascular accident) [facility])       Precautions: Fall, Skin  Chart, physical therapy assessment, plan of care and goals were reviewed. ASSESSMENT  Patient continues with skilled PT services. Pt lethargic this morning and unable to keep EO despite sternal chest rub, PROM to (B)U/LE's, and repositioning w/ Total Ax2. Pt w/ limited command following; moans and displays facial grimace w/ initial PROM and w/ repositioning in bed. Not safe to transfer pt to EOB at this time d/t limited responsiveness. PT to continue to follow. Current Level of Function Impacting Discharge (mobility/balance): Total Ax2    Other factors to consider for discharge: PLOF inferred to be independent. Mobility well below baseline. Assist x2 for simple bed mobility. PLAN :  Patient continues to benefit from skilled intervention to address the above impairments.   Continue treatment per established plan of care. to address goals. Recommendation for discharge: (in order for the patient to meet his/her long term goals)  Therapy up to 5 days/week in SNF setting at this time. If pt activity tolerance and progress improves, recommend IPR. This discharge recommendation:  Has been made in collaboration with the attending provider and/or case management    IF patient discharges home will need the following DME: to be determined (TBD)       SUBJECTIVE:   Patient stated Mmm. Lanre Crain    OBJECTIVE DATA SUMMARY:   Critical Behavior:  Neurologic State: Confused, Drowsy  Orientation Level: Other (Comment) (unable to fully assess 2/2 aphasia)  Cognition: Decreased command following  Safety/Judgement: Decreased awareness of environment, Decreased awareness of need for assistance, Decreased awareness of need for safety  Functional Mobility Training:  Bed Mobility:  Rolling: Total assistance;Assist x2    Therapeutic Exercises:   PROM completed to increase stimuli and awaken pt. (Hip/knee flexion (2x10), DF/PF, (B)UE GH flexion/ext, and elbow flex/ext)    Pain Rating:  Responds w Bradley Alvarado grimace and moans w/ initial RU/LE PROM and w/ roll to Right    Activity Tolerance:   Limited    After treatment patient left in no apparent distress:   Supine in bed, Heels elevated for pressure relief, Call bell within reach, and Side rails x 3    COMMUNICATION/COLLABORATION:   The patients plan of care was discussed with: Registered nurse.      Day Dawkins PTA   Time Calculation: 25 mins

## 2021-10-19 ENCOUNTER — APPOINTMENT (OUTPATIENT)
Dept: NON INVASIVE DIAGNOSTICS | Age: 71
DRG: 064 | End: 2021-10-19
Attending: FAMILY MEDICINE
Payer: MEDICARE

## 2021-10-19 LAB
ALBUMIN SERPL-MCNC: 2.7 G/DL (ref 3.5–5)
ALBUMIN/GLOB SERPL: 0.7 {RATIO} (ref 1.1–2.2)
ALP SERPL-CCNC: 86 U/L (ref 45–117)
ALT SERPL-CCNC: 29 U/L (ref 12–78)
ANION GAP SERPL CALC-SCNC: 9 MMOL/L (ref 5–15)
ASPIRIN TEST, ASPIRN: 474 ARU
AST SERPL-CCNC: 26 U/L (ref 15–37)
BASOPHILS # BLD: 0 K/UL (ref 0–0.1)
BASOPHILS NFR BLD: 0 % (ref 0–1)
BILIRUB SERPL-MCNC: 0.8 MG/DL (ref 0.2–1)
BUN SERPL-MCNC: 16 MG/DL (ref 6–20)
BUN/CREAT SERPL: 32 (ref 12–20)
CALCIUM SERPL-MCNC: 9.1 MG/DL (ref 8.5–10.1)
CHLORIDE SERPL-SCNC: 99 MMOL/L (ref 97–108)
CO2 SERPL-SCNC: 26 MMOL/L (ref 21–32)
CREAT SERPL-MCNC: 0.5 MG/DL (ref 0.55–1.02)
DIFFERENTIAL METHOD BLD: ABNORMAL
ECHO AO ROOT DIAM: 3.53 CM
ECHO AV AREA PEAK VELOCITY: 2.19 CM2
ECHO AV AREA/BSA PEAK VELOCITY: 0.9 CM2/M2
ECHO AV PEAK GRADIENT: 12.56 MMHG
ECHO AV PEAK VELOCITY: 177.18 CM/S
ECHO LA VOL 2C: 61.43 ML (ref 22–52)
ECHO LA VOLUME INDEX A2C: 26.59 ML/M2 (ref 16–28)
ECHO LV INTERNAL DIMENSION DIASTOLIC: 4.4 CM (ref 3.9–5.3)
ECHO LV INTERNAL DIMENSION SYSTOLIC: 2.97 CM
ECHO LV IVSD: 1 CM (ref 0.6–0.9)
ECHO LV MASS 2D: 127.1 G (ref 67–162)
ECHO LV MASS INDEX 2D: 55 G/M2 (ref 43–95)
ECHO LV POSTERIOR WALL DIASTOLIC: 0.79 CM (ref 0.6–0.9)
ECHO LVOT DIAM: 2.04 CM
ECHO LVOT PEAK GRADIENT: 5.68 MMHG
ECHO LVOT PEAK VELOCITY: 119.16 CM/S
ECHO LVOT SV: 80 ML
ECHO LVOT VTI: 24.54 CM
ECHO MV A VELOCITY: 82.45 CM/S
ECHO MV AREA PHT: 3.45 CM2
ECHO MV E DECELERATION TIME (DT): 220.14 MS
ECHO MV E VELOCITY: 95.33 CM/S
ECHO MV E/A RATIO: 1.16
ECHO MV PRESSURE HALF TIME (PHT): 63.84 MS
ECHO PV PEAK INSTANTANEOUS GRADIENT SYSTOLIC: 7.06 MMHG
ECHO RV TAPSE: 2.38 CM (ref 1.5–2)
EOSINOPHIL # BLD: 0 K/UL (ref 0–0.4)
EOSINOPHIL NFR BLD: 0 % (ref 0–7)
ERYTHROCYTE [DISTWIDTH] IN BLOOD BY AUTOMATED COUNT: 13.3 % (ref 11.5–14.5)
GLOBULIN SER CALC-MCNC: 3.9 G/DL (ref 2–4)
GLUCOSE BLD STRIP.AUTO-MCNC: 144 MG/DL (ref 65–117)
GLUCOSE BLD STRIP.AUTO-MCNC: 157 MG/DL (ref 65–117)
GLUCOSE SERPL-MCNC: 139 MG/DL (ref 65–100)
HCT VFR BLD AUTO: 38.5 % (ref 35–47)
HGB BLD-MCNC: 12.9 G/DL (ref 11.5–16)
IMM GRANULOCYTES # BLD AUTO: 0.1 K/UL (ref 0–0.04)
IMM GRANULOCYTES NFR BLD AUTO: 1 % (ref 0–0.5)
LYMPHOCYTES # BLD: 1.5 K/UL (ref 0.8–3.5)
LYMPHOCYTES NFR BLD: 10 % (ref 12–49)
MCH RBC QN AUTO: 31.3 PG (ref 26–34)
MCHC RBC AUTO-ENTMCNC: 33.5 G/DL (ref 30–36.5)
MCV RBC AUTO: 93.4 FL (ref 80–99)
MONOCYTES # BLD: 1.5 K/UL (ref 0–1)
MONOCYTES NFR BLD: 10 % (ref 5–13)
NEUTS SEG # BLD: 11.8 K/UL (ref 1.8–8)
NEUTS SEG NFR BLD: 79 % (ref 32–75)
NRBC # BLD: 0 K/UL (ref 0–0.01)
NRBC BLD-RTO: 0 PER 100 WBC
P2Y12 PLT RESPONSE,PPPR: 247 PRU (ref 194–418)
PLATELET # BLD AUTO: 282 K/UL (ref 150–400)
PMV BLD AUTO: 9.8 FL (ref 8.9–12.9)
POTASSIUM SERPL-SCNC: 3.4 MMOL/L (ref 3.5–5.1)
PROT SERPL-MCNC: 6.6 G/DL (ref 6.4–8.2)
RBC # BLD AUTO: 4.12 M/UL (ref 3.8–5.2)
SERVICE CMNT-IMP: ABNORMAL
SERVICE CMNT-IMP: ABNORMAL
SODIUM SERPL-SCNC: 134 MMOL/L (ref 136–145)
WBC # BLD AUTO: 15 K/UL (ref 3.6–11)

## 2021-10-19 PROCEDURE — 74011000250 HC RX REV CODE- 250: Performed by: FAMILY MEDICINE

## 2021-10-19 PROCEDURE — 93306 TTE W/DOPPLER COMPLETE: CPT | Performed by: SPECIALIST

## 2021-10-19 PROCEDURE — 65660000000 HC RM CCU STEPDOWN

## 2021-10-19 PROCEDURE — 36415 COLL VENOUS BLD VENIPUNCTURE: CPT

## 2021-10-19 PROCEDURE — 92507 TX SP LANG VOICE COMM INDIV: CPT | Performed by: SPEECH-LANGUAGE PATHOLOGIST

## 2021-10-19 PROCEDURE — 82962 GLUCOSE BLOOD TEST: CPT

## 2021-10-19 PROCEDURE — 74011000250 HC RX REV CODE- 250: Performed by: NURSE PRACTITIONER

## 2021-10-19 PROCEDURE — 99233 SBSQ HOSP IP/OBS HIGH 50: CPT | Performed by: NURSE PRACTITIONER

## 2021-10-19 PROCEDURE — 97530 THERAPEUTIC ACTIVITIES: CPT

## 2021-10-19 PROCEDURE — 97112 NEUROMUSCULAR REEDUCATION: CPT | Performed by: OCCUPATIONAL THERAPIST

## 2021-10-19 PROCEDURE — 92526 ORAL FUNCTION THERAPY: CPT | Performed by: SPEECH-LANGUAGE PATHOLOGIST

## 2021-10-19 PROCEDURE — C8929 TTE W OR WO FOL WCON,DOPPLER: HCPCS

## 2021-10-19 PROCEDURE — 74011250636 HC RX REV CODE- 250/636: Performed by: FAMILY MEDICINE

## 2021-10-19 PROCEDURE — 74011000250 HC RX REV CODE- 250: Performed by: INTERNAL MEDICINE

## 2021-10-19 PROCEDURE — 80053 COMPREHEN METABOLIC PANEL: CPT

## 2021-10-19 PROCEDURE — 74011250637 HC RX REV CODE- 250/637: Performed by: INTERNAL MEDICINE

## 2021-10-19 PROCEDURE — 85025 COMPLETE CBC W/AUTO DIFF WBC: CPT

## 2021-10-19 PROCEDURE — 85576 BLOOD PLATELET AGGREGATION: CPT

## 2021-10-19 PROCEDURE — 74011250637 HC RX REV CODE- 250/637: Performed by: FAMILY MEDICINE

## 2021-10-19 PROCEDURE — 97535 SELF CARE MNGMENT TRAINING: CPT | Performed by: OCCUPATIONAL THERAPIST

## 2021-10-19 PROCEDURE — 74011250636 HC RX REV CODE- 250/636: Performed by: NURSE PRACTITIONER

## 2021-10-19 RX ADMIN — HYDROCHLOROTHIAZIDE 12.5 MG: 25 TABLET ORAL at 09:09

## 2021-10-19 RX ADMIN — ACETAMINOPHEN 650 MG: 650 SUPPOSITORY RECTAL at 03:01

## 2021-10-19 RX ADMIN — ATORVASTATIN CALCIUM 40 MG: 40 TABLET, FILM COATED ORAL at 09:10

## 2021-10-19 RX ADMIN — CEFEPIME HYDROCHLORIDE 2 G: 2 INJECTION, POWDER, FOR SOLUTION INTRAVENOUS at 16:14

## 2021-10-19 RX ADMIN — FAMOTIDINE 20 MG: 10 INJECTION, SOLUTION INTRAVENOUS at 21:37

## 2021-10-19 RX ADMIN — METRONIDAZOLE 500 MG: 500 INJECTION, SOLUTION INTRAVENOUS at 21:37

## 2021-10-19 RX ADMIN — ACETAMINOPHEN 650 MG: 325 TABLET ORAL at 14:40

## 2021-10-19 RX ADMIN — DEXTROSE AND SODIUM CHLORIDE 75 ML/HR: 5; 450 INJECTION, SOLUTION INTRAVENOUS at 03:01

## 2021-10-19 RX ADMIN — METRONIDAZOLE 500 MG: 500 INJECTION, SOLUTION INTRAVENOUS at 09:10

## 2021-10-19 RX ADMIN — CLOPIDOGREL BISULFATE 75 MG: 75 TABLET ORAL at 09:09

## 2021-10-19 RX ADMIN — LISINOPRIL 10 MG: 10 TABLET ORAL at 09:00

## 2021-10-19 RX ADMIN — PERFLUTREN 1.5 ML: 6.52 INJECTION, SUSPENSION INTRAVENOUS at 16:00

## 2021-10-19 RX ADMIN — CEFEPIME HYDROCHLORIDE 2 G: 2 INJECTION, POWDER, FOR SOLUTION INTRAVENOUS at 09:10

## 2021-10-19 RX ADMIN — FAMOTIDINE 20 MG: 10 INJECTION, SOLUTION INTRAVENOUS at 09:10

## 2021-10-19 RX ADMIN — ASPIRIN 81 MG: 81 TABLET, COATED ORAL at 09:09

## 2021-10-19 NOTE — PROGRESS NOTES
Neurology Progress Note  Conception ASHLEY Crain      Date of admission: 10/15/2021    Patient: Johnathan Linares MRN: [id number]  SSN: [id number]    YOB: 1950  Age: 66 y.o. Sex: female        Subjective:     HPI: Johnathan Linares is a 66 y.o. female with no known medical history who was found with increasing weakness and inability to produce words. She was transferred here from an outside hospital.  She remains aphasic and weak. It looks like she was on low-dose aspirin prior to admission. Head CT with MRI confirming left MCA territory infarcts in the posterior frontal lobe anterior temporal lobe. CTA unrevealing. LDL elevated at 102. She indicates to me she has a headache, but no nausea. Interval 10/19/21:    She remain aphasic only responding yes to questions able to nod appropriately. Review of systems  Unable to obtain     No past medical history on file. No past surgical history on file. No family history on file. Social History     Tobacco Use    Smoking status: Not on file   Substance Use Topics    Alcohol use: Not on file      Prior to Admission medications    Medication Sig Start Date End Date Taking? Authorizing Provider   atorvastatin (LIPITOR) 10 mg tablet Take 10 mg by mouth daily. Provider, Historical   aspirin delayed-release 81 mg tablet Take 81 mg by mouth daily. Provider, Historical   hydroCHLOROthiazide (HYDRODIURIL) 25 mg tablet Take 25 mg by mouth daily. Provider, Historical   cholecalciferol (VITAMIN D3) (1000 Units /25 mcg) tablet Take 1,000 Units by mouth daily. Provider, Historical   therapeutic multivitamin (THERAGRAN) tablet Take 1 Tablet by mouth daily. Provider, Historical   B.infantis-B.ani-B.long-B.bifi (Probiotic 4X) 10-15 mg TbEC Take  by mouth daily. Provider, Historical   naproxen (NAPROSYN) 500 mg tablet Take 500 mg by mouth two (2) times daily (with meals).     Provider, Historical     Current Facility-Administered Medications   Medication Dose Route Frequency Provider Last Rate Last Admin    cefepime (MAXIPIME) 2 g in sterile water (preservative free) 10 mL IV syringe  2 g IntraVENous Q8H Colton Dedrickpapa Benoit,  mL/hr at 10/19/21 0910 2 g at 10/19/21 0910    metroNIDAZOLE (FLAGYL) IVPB premix 500 mg  500 mg IntraVENous Q12H Rene Mar  mL/hr at 10/19/21 0910 500 mg at 10/19/21 0910    hydroCHLOROthiazide (HYDRODIURIL) tablet 12.5 mg  12.5 mg Oral DAILY Rudi PALOMO MD   12.5 mg at 10/19/21 0909    dextrose 5 % - 0.45% NaCl infusion  75 mL/hr IntraVENous CONTINUOUS Lenora Munoz MD 75 mL/hr at 10/19/21 0301 75 mL/hr at 10/19/21 0301    lisinopriL (PRINIVIL, ZESTRIL) tablet 10 mg  10 mg Oral DAILY Rudi PALOMO MD   10 mg at 10/19/21 0900    atorvastatin (LIPITOR) tablet 40 mg  40 mg Oral DAILY Lenora Munoz MD   40 mg at 10/19/21 0910    aspirin delayed-release tablet 81 mg  81 mg Oral DAILY Jannie Mancuso MD   81 mg at 10/19/21 0909    acetaminophen (TYLENOL) tablet 650 mg  650 mg Oral Q4H PRN Jannie Mancuso MD   650 mg at 10/16/21 1757    Or    acetaminophen (TYLENOL) solution 650 mg  650 mg Per NG tube Q4H PRN Jannie Mancuso MD        Or   [facility] acetaminophen (TYLENOL) suppository 650 mg  650 mg Rectal Q4H PRN Jannie Mancuso MD   650 mg at 10/19/21 0301    clopidogreL (PLAVIX) tablet 75 mg  75 mg Oral DAILY aJnnie Mancuso MD   75 mg at 10/19/21 0909    famotidine (PF) (PEPCID) 20 mg in 0.9% sodium chloride 10 mL injection  20 mg IntraVENous Q12H Jannie Mancuso MD   20 mg at 10/19/21 0910        No Known Allergies    Objective:     Vitals:    10/19/21 0600 10/19/21 0603 10/19/21 1000 10/19/21 1004   BP: (!) 140/50  (!) 152/63    Pulse: 72 72 74 71   Resp: 17  21    Temp: (!) 100.8 °F (38.2 °C)  98.9 °F (37.2 °C)    SpO2: 91%  92%         Temp (24hrs), Av.7 °F (38.2 °C), Min:98.9 °F (37.2 °C), Max:102.2 °F (39 °C)        O2 Device: None (Room air)         Intake/Output Summary (Last 24 hours) at 10/19/2021 1032  Last data filed at 10/19/2021 0707  Gross per 24 hour   Intake 240 ml   Output 600 ml   Net -360 ml       General: In NAD. Cardiac: RRR  Lungs: Unlabored breathing. Abdomen: Soft/NT/non-distended. Extremities. No edema. Neurologic Exam:  Mental Status:  Alert and oriented x self     Language:    Expressive aphasia; response yes to most questions   Cranial Nerves:   Pupils equal, round and reactive to light. Left gaze preference      Extraocular movements intact w/o nystagmus      Facial sensation intact to LT     Left facial droop      Hearing grossly intact. Motor:    Bulk and tone normal.      Follow command on the left side, unable to lift  Leg on command but withdraws : Right side pain and flaccid with edema      No pronator drift. No involuntary movements. Sensation:        Coordination & Gait: Gait deferred    LABS:  Recent Labs     10/18/21  1038   WBC 16.4*   HGB 14.5   HCT 44.0        No results for input(s): NA, K, CL, CO2, BUN, CREA, GLU, CA, MG, PHOS, URICA in the last 72 hours. No results for input(s): ALT, AP, TBIL, TBILI, TP, ALB, GLOB, GGT, AML, LPSE in the last 72 hours. No lab exists for component: SGOT, GPT, AMYP, HLPSE  No results for input(s): INR, PTP, APTT, INREXT in the last 72 hours. No results for input(s): PHI, PCO2I, PO2I, HCO3I in the last 72 hours. No results for input(s): CPK, CKNDX, TROIQ in the last 72 hours.     No lab exists for component: CPKMB  Lab Results   Component Value Date/Time    Cholesterol, total 162 10/16/2021 12:25 AM    HDL Cholesterol 39 10/16/2021 12:25 AM    LDL, calculated 102.6 (H) 10/16/2021 12:25 AM    Triglyceride 102 10/16/2021 12:25 AM    CHOL/HDL Ratio 4.2 10/16/2021 12:25 AM     Lab Results   Component Value Date/Time    Glucose (POC) 144 (H) 10/19/2021 07:02 AM    Glucose (POC) 157 (H) 10/19/2021 05:14 AM    Glucose (POC) 142 (H) 10/18/2021 10:37 PM    Glucose (POC) 155 (H) 10/18/2021 11:39 AM    Glucose (POC) 158 (H) 10/18/2021 05:47 AM     Lab Results   Component Value Date/Time    Color YELLOW/STRAW 10/18/2021 07:01 PM    Appearance CLOUDY (A) 10/18/2021 07:01 PM    Specific gravity 1.021 10/18/2021 07:01 PM    pH (UA) 5.5 10/18/2021 07:01 PM    Protein TRACE (A) 10/18/2021 07:01 PM    Glucose 100 (A) 10/18/2021 07:01 PM    Ketone Negative 10/18/2021 07:01 PM    Bilirubin Negative 10/18/2021 07:01 PM    Urobilinogen 0.2 10/18/2021 07:01 PM    Nitrites Positive (A) 10/18/2021 07:01 PM    Leukocyte Esterase MODERATE (A) 10/18/2021 07:01 PM    Epithelial cells MODERATE (A) 10/18/2021 07:01 PM    Bacteria 2+ (A) 10/18/2021 07:01 PM    WBC 20-50 10/18/2021 07:01 PM    RBC 5-10 10/18/2021 07:01 PM       No results for input(s): FE, TIBC, PSAT, FERR in the last 72 hours. No results found for: FOL, RBCF   No results for input(s): PH, PCO2, PO2 in the last 72 hours. No results for input(s): CPK, CKNDX, TROIQ in the last 72 hours. No lab exists for component: CPKMB    Imaging:  XR CHEST PORT    Result Date: 10/18/2021  No significant change. CT Results:  Results from Hospital Encounter encounter on 10/15/21    CT CODE NEURO PERF W CBF    Narrative  EXAM:  CT CODE NEURO PERF W CBF    INDICATION:  CVA transfer with LVO    COMPARISON:  CT head and CT angiogram of earlier in the day    TECHNIQUE:    CT brain perfusion was performed with generation of hemodynamic maps of multiple  parameters, including cerebral blood flow, cerebral volume, Tmax and MTT (mean  transit time). The study was performed following the IV injection of 50 cc  Isovue-370. This study was analyzed by the [address] ai algorithm. CT dose reduction was achieved through the use of a standardized protocol  tailored for this examination and automatic exposure control for dose  modulation. FINDINGS:    CT PERFUSION:  There is diminished perfusion in the left middle cerebral artery territory in  the left frontal lobe.   rCBF < 30% = 26 cc. Tmax > 6 seconds = 46 cc. Mismatch volume: 20 cc. Mismatch ratio: 1.8    Impression  Ischemia in the left middle cerebral artery territory in the left frontal lobe. 20 cc mismatch volume. Mismatch ratio 1.8. CT CODE NEURO HEAD WO CONTRAST    Narrative  EXAM: CT CODE NEURO HEAD WO CONTRAST    INDICATION: CVA transfer    COMPARISON: 1043 hours and 1030 hours. CONTRAST: None. TECHNIQUE: Unenhanced CT of the head was performed using 5 mm images. Brain and  bone windows were generated. Coronal and sagittal reformats. CT dose reduction  was achieved through use of a standardized protocol tailored for this  examination and automatic exposure control for dose modulation. FINDINGS:  The ventricles and sulci are normal in size, shape and configuration. . Air is  loss of the gray-white differentiation in the left posterior frontal and left  anterior parietal lobes (series 2, image 20). . There is a hyperdense left MCA. There is no intracranial hemorrhage, extra-axial collection, or mass effect. The  basilar cisterns are open. No CT evidence of acute infarct. The bone windows demonstrate no abnormalities. The visualized portions of the  paranasal sinuses and mastoid air cells are clear. Impression  Findings compatible with acute left MCA infarct. No evidence for parenchymal hemorrhage at this time.     This result was verbally relayed by me at 1341 hours to Mariely Ty      Results from [place] encounter on 10/15/21    CTA CODE NEURO HEAD AND NECK W CONT    Addendum 10/15/2021 12:08 PM  Addendum:    Review of the images demonstrates possible occlusion of the anterior division of  the left anterior cerebral artery, with a relative paucity of vessels in the  left frontal lobe anterior middle cerebral artery territory.:    Narrative  EXAM:  CTA CODE NEURO HEAD AND NECK W CONT    INDICATION:  Code Stroke    COMPARISON:  Noncontrast head CT of earlier in the day    TECHNIQUE:  Multislice helical axial CT angiography was performed from the aortic arch to  the top of the head during uneventful rapid bolus intravenous administration of  100 mL of Isovue 370. Postprocessing was performed to include MIP and  reformatted images. Standard images of the head were also obtained following  contrast administration and a delay. CT dose reduction was achieved through the use of a standardized protocol  tailored for this examination and automatic exposure control for dose  modulation. This study was analyzed by the [address] ai algorithm. FINDINGS:    HEAD CT:  The ventricles are normal in size and midline. .  There is no intracranial  hemorrhage or evolving infarct. . There is no abnormal enhancement. . .    CTA NECK:    Great vessels: Normal arch anatomy with the origins patent. Right subclavian artery: Patent    Left subclavian artery: Patent    Right common carotid artery: Patent    Left common carotid artery: Patent    Cervical right internal carotid artery: Patent with no significant stenosis by  NASCET criteria. Cervical left internal carotid artery: Patent with no significant stenosis by  NASCET criteria. Focal calcified plaque with less than 25% stenosis. Right vertebral artery: Patent    Left vertebral artery: Patent    No thyroid nodule. Lung apices clear. No cervical adenopathy. Degenerative  changes in the cervical spine. CTA HEAD:    Right cavernous internal carotid artery: Patent    Left cavernous internal carotid artery: Patent    Anterior cerebral arteries: Patent. Hypoplastic absent right A1 segment with  supply of the right A2 segment via the anterior communicating artery. Anterior communicating artery: Patent    Right middle cerebral artery: Patent    Left middle cerebral artery: Patent    Posterior cerebral arteries: Patent. Fetal origin of the right posterior  cerebral artery. Absent right P1 segment.  Patent left posterior cerebral artery. Posterior communicating arteries: No significant left posterior communicating  artery. Basilar artery: Patent    Distal vertebral arteries: Patent    No aneurysm or vascular malformation. Patency of the major venous sinuses and  deep venous system. Impression  1. No acute large vessel occlusion or flow-limiting stenosis. MRI Results:  Results from [place] encounter on 10/15/21    MRI BRAIN WO CONT    Narrative  *PRELIMINARY REPORT*    There is acute infarction in the gray matter of the left frontal lobe in an area  measuring approximately 5.4 x 2.3 cm. Preliminary report was provided by Dr. Favio Faith, the on-call radiologist, at 8:00 PM  on 10/15/2021    Final report to follow. *END PRELIMINARY REPORT*    CLINICAL HISTORY: Left MCA territory ischemia. INDICATION: Left MCA territory ischemia. COMPARISON: CTA perfusion and 1521    TECHNIQUE: MR examination of the brain includes axial and sagittal T1, coronal  T2, axial T2, axial FLAIR, axial gradient echo, axial DWI. CONTRAST: None    FINDINGS:  There is a moderate acute infarction in the anterior left frontal lobe  inferiorly. Infarct overlies the insular cortex. Mild periventricular and  scattered foci of increased T2 signal intensity and corona radiata and centrum  semiovale. Likely occluded anterior division branch vessel on the left. Minimal  sulcal and ventricular prominence. Infarct is predominantly cortical in  appearance. There is no significant superimposed hemorrhage midline shift or  mass effect. The brain architecture is normal. There is no evidence of midline  shift or mass-effect. There are no extra-axial fluid collections. There is no  Chiari or syrinx. The pituitary and infundibulum are grossly unremarkable. There  is no skull base mass. Cerebellopontine angles are grossly unremarkable. The  major intracranial vascular flow-voids are unremarkable. The cavernous sinuses  are symmetric.  Optic chiasm and infundibulum grossly unremarkable. Orbits are  grossly symmetric. Dural venous sinuses are grossly patent. Trace sphenoid sinus disease. The mastoid air cells are well pneumatized and clear. Impression  Moderate acute infarction of the anterior inferior left frontal lobe,  predominantly cortically based without significant superimposed hemorrhage, mass  effect. Mild chronic microvascular ischemic change and mild cerebral atrophy. XR Results   Results from [place] encounter on 10/15/21    XR CHEST PORT    Impression  No significant change. XR CHEST PORT    Impression  Mild diffuse interstitial and airspace opacities could be due to viral or  atypical pneumonia in the correct clinical setting. Pulmonary edema less likely. VAS/US Results (maximum last 3): No results found for this or any previous visit. TTE        EKG  Results for orders placed or performed during the hospital encounter of 10/15/21   EKG, 12 LEAD, INITIAL   Result Value Ref Range    Ventricular Rate 66 BPM    Atrial Rate 66 BPM    P-R Interval 168 ms    QRS Duration 90 ms    Q-T Interval 436 ms    QTC Calculation (Bezet) 457 ms    Calculated P Axis 77 degrees    Calculated R Axis -26 degrees    Calculated T Axis 41 degrees    Diagnosis       Normal sinus rhythm  Normal ECG  No previous ECGs available         Hospital Problems  Never Reviewed        Codes Class Noted POA    * (Principal) CVA (cerebral vascular accident) [facility]) ICD-10-CM: I63.9  ICD-9-CM: 434.91  10/15/2021 Unknown              Assessment/Plan:     Earnest Machuca is a 66 y.o. female who presented with upper and lower extremity weakness and aphasia on the right side She was discovered to have a left MCA territory infarct. On examination, she remained aphasic, and you could tell she was frustrated.  She was able to follow commands on the left , flaccid on the right with pain, some edema    Plan    -.6, continue Lipitor 40 mg nightly   -A1c 5.8, at goal   -ARU TEST and P2Y12 -pending    -continue Plavix  75 mg and ASA  81 mg daily    -echo, Pending    -Right arm pain, will defer workup to primary team    - Stroke education    -PT/OT/SLP     Thank you for allowing the Neurology Service the pleasure of participating in the care of your patient. This patient will be discussed with my collaborating care team physician  and he may have further recommendations regarding this patient's care. Thank you for this consult.     Signed By: Isidra Villalta NP     October 19, 2021 10:32 AM

## 2021-10-19 NOTE — PROGRESS NOTES
Problem: Mobility Impaired (Adult and Pediatric)  Goal: *Acute Goals and Plan of Care (Insert Text)  Outcome: Progressing Towards Goal   PHYSICAL THERAPY TREATMENT  Patient: Eze Lazo (40 y.o. female)  Date: 10/19/2021  Diagnosis: CVA (cerebral vascular accident) [facility]) [I63.9] CVA (cerebral vascular accident) [facility])       Precautions: Fall, Skin  Chart, physical therapy assessment, plan of care and goals were reviewed. ASSESSMENT  Patient continues with skilled PT services and is progressing towards goals. She demonstrates mildly improved alertness this session. Patient demonstrates poor command following. She wiggles bilateral slightly when requested. Provided increased time patient demonstrates the ability to attempt to raise the L UE in to her gown when assisted with changing. She is provided total A for supine to with. Once in sitting patient demonstrates fair/good sitting balance with feet planted. As she fatigues patient demonstrates increased R posterior lean. Patient also winces and grimaces with movement off all joints. Patient is most sensitive about movement of the R shoulder and actively resist gently movement. Current Level of Function Impacting Discharge (mobility/balance): Total A     Other factors to consider for discharge: Medical stability, increased need for assistance, inability to ambulate or transfer at this time          PLAN :  Patient continues to benefit from skilled intervention to address the above impairments. Continue treatment per established plan of care. to address goals. Recommendation for discharge: (in order for the patient to meet his/her long term goals)  Therapy up to 5 days/week in SNF setting    This discharge recommendation:  Has been made in collaboration with the attending provider and/or case management    IF patient discharges home will need the following DME: to be determined (TBD)       SUBJECTIVE:   Patient stated Yes.     OBJECTIVE DATA SUMMARY:   Critical Behavior:  Neurologic State: Alert  Orientation Level: Unable to verbalize  Cognition: No command following  Safety/Judgement: Decreased awareness of environment, Decreased awareness of need for assistance, Decreased awareness of need for safety  Functional Mobility Training:  Bed Mobility:  Rolling: Total assistance;Bed Modified;Assist x2  Supine to Sit: Total assistance;Assist x2  Sit to Supine: Total assistance;Assist x2  Scooting: Maximum assistance        Transfers:                                   Balance:  Sitting: Impaired; Without support  Sitting - Static: Good (unsupported); Fair (occasional)  Sitting - Dynamic: Fair (occasional)  Ambulation/Gait Training:                                                        Stairs: Therapeutic Exercises:     Pain Rating:      Activity Tolerance:   Good    After treatment patient left in no apparent distress:   Supine in bed, Call bell within reach, and Side rails x 3    COMMUNICATION/COLLABORATION:   The patients plan of care was discussed with: Occupational therapist and Registered nurse.      Zaina Bruno PT   Time Calculation: 29 mins

## 2021-10-19 NOTE — PROGRESS NOTES
[address] [facility] Adult  Hospitalist Group                                                                                          Hospitalist Progress Note  Robby Kohli NP  Answering service: [phone] from in house phone        Date of Service:  10/19/2021  NAME:  Eze Lazo  :    MRN:  [id number]      Admission Summary:   Per medical record, Eze Lazo is a 66 y.o. female who presents with aphasia    Patient presented to [facility] with aphasia.   No history can be obtained from the patient, apparently patient went to bed in normal state, woke up today morning with complete aphasia and right-sided weakness, she was unable to speak or stand, came to the ER, was found to have acute CVA and was requested to be admitted to the hospitalist service. No further history could be obtained from the patient or chart review    Interval history / Subjective:   I saw the patient this morning on rounds. Patient denies pain, discomfort however does endorse dysuria. Patient does answer \"yeah\" for yes and 'hmmm' or no appropriately. Assessment & Plan:     Acute Left MCA ischemic stroke with right U/L weakness and aphasia -- not thrombectomy candidate as stroke completed at time of eval  CTA:  possible occlusion of the anterior division of  the left anterior cerebral artery, with a relative paucity of vessels in the  left frontal lobe anterior middle cerebral artery territory  MRI:  Moderate acute infarction of the anterior inferior left frontal lobe,  predominantly cortically based without significant superimposed hemorrhage, mass  Effect. Mild chronic microvascular ischemic change and mild cerebral atrophy.   Continuing atorvastatin  - Cards:  EKG NSR, echo not done, on tele  Continue aspirin  Continuing clopidogrel  Neurology following, thank you recommendations  Physical therapy recommending SNF versus IPR  Working with OT  SLP working with patient, currently n.p.o. except for meds crushed in applesauce      Pneumonia  In setting of aphasia, concern for aspiration  COVID-19 PCR was negative  Continuing metronidazole  SLP   N.p.o. except meds for now  Patient still febrile, 38.2 degrees overnight. Expanding for pseudomonal coverage with cefepime see below  Follow-up chest x-ray last night was with no changes    Acute cystitis with hematuria  This was not present on admission  Urinalysis yesterday indicative of urinary tract infection  We will upgrade antibiotics to cefepime for pseudomonal coverage      Sepsis  Present on admission  Did have leukocytosis and persistent fevers  Dynamically stable  Unclear if due to aspiration pneumonia versus cystitis, treating both            HTN  Blood pressure currently controlled  Continuing hydrochlorothiazide  Continuing lisinopril  We will continue to monitor      HLD    Continuing atorvastatin    Hypokalemia  Has been repleted  Has not had labs in a couple of days, will check labs           Code status: Full    DVT prophylaxis: SCDs    Care Plan discussed with: Patient/Family     Anticipated Disposition: [facility]H/Rehab     Anticipated Discharge: 24 hours to 48 hours     Hospital Problems  Never Reviewed        Codes Class Noted POA    * (Principal) CVA (cerebral vascular accident) [facility]) ICD-10-CM: I63.9  ICD-9-CM: 434.91  10/15/2021 Unknown                Review of Systems:   Pertinent items are noted in HPI. Vital Signs:    Last 24hrs VS reviewed since prior progress note.  Most recent are:  Visit Vitals  BP (!) 140/50 (BP 1 Location: Right arm, BP Patient Position: At rest)   Pulse 72   Temp (!) 100.8 °F (38.2 °C)   Resp 17   Ht 5' 7\" (1.702 m)   Wt 124.2 kg (273 lb 13 oz)   SpO2 91%   BMI 42.88 kg/m²         Intake/Output Summary (Last 24 hours) at 10/19/2021 0907  Last data filed at 10/19/2021 0707  Gross per 24 hour   Intake 240 ml   Output 600 ml   Net -360 ml        Physical Examination: Constitutional:  No acute distress, mute, does not follow instructions       Resp:  CTA bilaterally. No wheezing/rhonchi/rales. No accessory muscle use   CV:  Regular rhythm, normal rate, no murmurs, gallops, rubs    GI:  Soft, non distended, non tender. Protuberant normoactive bowel sounds, no hepatosplenomegaly     Musculoskeletal:  No edema, warm, 2+ pulses throughout    Neurologic:  Right-sided hemiplegia, right facial weakness, aphasia            Data Review:    Review and/or order of clinical lab test      Labs:     Recent Labs     10/18/21  1038   WBC 16.4*   HGB 14.5   HCT 44.0        No results for input(s): NA, K, CL, CO2, BUN, CREA, GLU, CA, MG, PHOS, URICA in the last 72 hours. No results for input(s): ALT, AP, TBIL, TBILI, TP, ALB, GLOB, GGT, AML, LPSE in the last 72 hours. No lab exists for component: SGOT, GPT, AMYP, HLPSE  No results for input(s): INR, PTP, APTT, INREXT, INREXT in the last 72 hours. No results for input(s): FE, TIBC, PSAT, FERR in the last 72 hours. No results found for: FOL, RBCF   No results for input(s): PH, PCO2, PO2 in the last 72 hours. No results for input(s): CPK, CKNDX, TROIQ in the last 72 hours.     No lab exists for component: CPKMB  Lab Results   Component Value Date/Time    Cholesterol, total 162 10/16/2021 12:25 AM    HDL Cholesterol 39 10/16/2021 12:25 AM    LDL, calculated 102.6 (H) 10/16/2021 12:25 AM    Triglyceride 102 10/16/2021 12:25 AM    CHOL/HDL Ratio 4.2 10/16/2021 12:25 AM     Lab Results   Component Value Date/Time    Glucose (POC) 144 (H) 10/19/2021 07:02 AM    Glucose (POC) 157 (H) 10/19/2021 05:14 AM    Glucose (POC) 142 (H) 10/18/2021 10:37 PM    Glucose (POC) 155 (H) 10/18/2021 11:39 AM    Glucose (POC) 158 (H) 10/18/2021 05:47 AM     Lab Results   Component Value Date/Time    Color YELLOW/STRAW 10/18/2021 07:01 PM    Appearance CLOUDY (A) 10/18/2021 07:01 PM    Specific gravity 1.021 10/18/2021 07:01 PM    pH (UA) 5.5 10/18/2021 07:01 PM    Protein TRACE (A) 10/18/2021 07:01 PM    Glucose 100 (A) 10/18/2021 07:01 PM    Ketone Negative 10/18/2021 07:01 PM    Bilirubin Negative 10/18/2021 07:01 PM    Urobilinogen 0.2 10/18/2021 07:01 PM    Nitrites Positive (A) 10/18/2021 07:01 PM    Leukocyte Esterase MODERATE (A) 10/18/2021 07:01 PM    Epithelial cells MODERATE (A) 10/18/2021 07:01 PM    Bacteria 2+ (A) 10/18/2021 07:01 PM    WBC 20-50 10/18/2021 07:01 PM    RBC 5-10 10/18/2021 07:01 PM         Medications Reviewed:     Current Facility-Administered Medications   Medication Dose Route Frequency    cefepime (MAXIPIME) 2 g in sterile water (preservative free) 10 mL IV syringe  2 g IntraVENous Q8H    metroNIDAZOLE (FLAGYL) IVPB premix 500 mg  500 mg IntraVENous Q12H    hydroCHLOROthiazide (HYDRODIURIL) tablet 12.5 mg  12.5 mg Oral DAILY    dextrose 5 % - 0.45% NaCl infusion  75 mL/hr IntraVENous CONTINUOUS    lisinopriL (PRINIVIL, ZESTRIL) tablet 10 mg  10 mg Oral DAILY    atorvastatin (LIPITOR) tablet 40 mg  40 mg Oral DAILY    aspirin delayed-release tablet 81 mg  81 mg Oral DAILY    acetaminophen (TYLENOL) tablet 650 mg  650 mg Oral Q4H PRN    Or    acetaminophen (TYLENOL) solution 650 mg  650 mg Per NG tube Q4H PRN    Or    acetaminophen (TYLENOL) suppository 650 mg  650 mg Rectal Q4H PRN    clopidogreL (PLAVIX) tablet 75 mg  75 mg Oral DAILY    famotidine (PF) (PEPCID) 20 mg in 0.9% sodium chloride 10 mL injection  20 mg IntraVENous Q12H     ______________________________________________________________________  EXPECTED LENGTH OF STAY: 4d 9h  ACTUAL LENGTH OF STAY:          1 Dipika Mahajan, NP

## 2021-10-19 NOTE — PROGRESS NOTES
Problem: Self Care Deficits Care Plan (Adult)  Goal: *Acute Goals and Plan of Care (Insert Text)  Description: FUNCTIONAL STATUS PRIOR TO ADMISSION: Patient is globally aphasic, unable to provide PLOF, limited information in chart with indication of a spouse. HOME SUPPORT: Unclear per chart review    Occupational Therapy Goals  Initiated 10/16/2021   1. Patient will perform self-feeding with maximal assistance within 7 day(s). 2.  Patient will perform grooming with moderate assistance within 7 day(s). 3.  Patient will perform lower body dressing with maximal assistance within 7 day(s). 4.  Patient will perform toilet transfers to/from [facility] with maximal assistance x2 within 7 day(s). 5.  Patient will perform all aspects of toileting with maximal assistance within 7 day(s). 6.  Patient will participate in upper extremity therapeutic exercise/activities with supervision/set-up within 7 day(s). 7.  Patient will utilize energy conservation techniques during functional activities with verbal and visual cues within 7 day(s). 8.  Patient will complete the Fugl Lafleur UE Assessment within 7 days. Outcome: Progressing Towards Goal     OCCUPATIONAL THERAPY TREATMENT  Patient: Xochitl Lopez (18 y.o. female)  Date: 10/19/2021  Diagnosis: CVA (cerebral vascular accident) [facility]) [I63.9] CVA (cerebral vascular accident) [facility])       Precautions: Fall, Skin  Chart, occupational therapy assessment, plan of care, and goals were reviewed. ASSESSMENT  Patient continues with skilled OT services and is progressing towards goals. Pt alert this session with mixed aphasia, decreased command following <5%, R side hemiparesis and significant pain in all extremities/joints UEs > LEs this session. She was resistive to LUE active and AAROM due to pain, but was able to come to sit EOB and maintain balance with min A. Recommend IP rehab vs SNF at discharge pending progress.     Current Level of Function Impacting Discharge (ADLs): total A    Other factors to consider for discharge: pain in all extremities/joints. Pt's brother states pt has very bad arthritis all over         PLAN :  Patient continues to benefit from skilled intervention to address the above impairments. Continue treatment per established plan of care to address goals. Recommend with staff: van lift for all OOB, chair position in bed 3x day    Recommend next OT session: any ADLs or UE ROM, command following    Recommendation for discharge: (in order for the patient to meet his/her long term goals)  Therapy 3 hours per day 5-7 days per week vs SNF    This discharge recommendation:  Has been made in collaboration with the attending provider and/or case management    IF patient discharges home will need the following DME: TBD       SUBJECTIVE:   Patient stated Thank you.     OBJECTIVE DATA SUMMARY:   Cognitive/Behavioral Status:  Neurologic State: Alert  Orientation Level: Unable to verbalize  Cognition: Decreased attention/concentration;Decreased command following  Perception: Cues to attend right visual field;Cues to attend to right side of body;Cues to maintain midline in sitting; Tactile;Verbal;Visual  Perseveration: No perseveration noted  Safety/Judgement: Not assessed    Neuro Re-Education and Functional Mobility and Transfers for ADLs:  Bed Mobility:  Rolling: Total assistance;Bed Modified;Assist x2  Supine to Sit: Total assistance;Assist x2  Sit to Supine: Total assistance;Assist x2  Scooting: Maximum assistance    Balance:  Sitting: Impaired; Without support  Sitting - Static: Good (unsupported); Fair (occasional)  Sitting - Dynamic: Fair (occasional)    Neuro Re-Education and ADL Intervention:  Grooming  Grooming Assistance: Total assistance(dependent) (pt too painful for hand over hand)  Washing Face: Total assistance (dependent)  Cues: Physical assistance; Tactile cues provided;Verbal cues provided;Visual cues provided    Upper Body Dressing Assistance  Dressing Assistance: Total assistance(dependent)  Hospital Gown: Total assistance (dependent) - pt initiated threading LUE into gown sleeve when presented in front of her  Cues: Physical assistance; Tactile cues provided;Verbal cues provided;Visual cues provided    Lower Body Dressing Assistance  Dressing Assistance: Total assistance(dependent)  Socks: Total assistance (dependent)  Position Performed: Supine  Cues: Don;Physical assistance; Tactile cues provided;Verbal cues provided;Visual cues provided      Cognitive Retraining  Orientation Retraining: Reorienting  Problem Solving: Identifying the task  Executive Functions: Executing cognitive plans  Safety/Judgement: Not assessed  Cues: Tactile cues provided;Verbal cues provided;Visual cues provided    Pain:  significant pain in all extremities/joints UEs > LEs with wincing, crying and moaning noted    Activity Tolerance:   Fair    After treatment patient left in no apparent distress:   Supine in bed, Call bell within reach, Bed / chair alarm activated, and Caregiver / family present    COMMUNICATION/COLLABORATION:   The patients plan of care was discussed with: Physical therapist, Speech therapist, and Registered nurse. Patient was educated regarding Her deficit(s) of R HP, mixed aphaisa as this relates to Her diagnosis of L MCA CVA. She demonstrated Guarded understanding as evidenced by unable to verbalize. Patient and/or family was verbally educated on the BE FAST acronym for signs/symptoms of CVA and TIA. BE FAST was written on patient's communication board  for visual education and reinforcement. All questions answered with patient indicating poor understanding.      Oliver Zhou OT  Time Calculation: 29 mins

## 2021-10-19 NOTE — PROGRESS NOTES
Transition of Care Plan   RUR- Low 7%   DISPOSITION: SNF - [facility] - pending medical stability    F/U with PCP/Specialist     Transport: [facility] (quote requested)    [address] will not have a bed available until end of next week, around 10/28. CM followed up with [facility] Admissions, Francesco Maria, regarding referral.     Message left for Razia Butts in Admissions with [facility]. [facility] does not have a bed available. 9:28 am: No beds available at [facility]. 2:27pm: [facility] will accept patient for SNF. CM discussed discharge plan and he is in agreement with DC to [facility] pending medical stability. CM sent referral to [facility] for quote to provide to spouse. AYAH KovacsS.W.

## 2021-10-19 NOTE — PROGRESS NOTES
Physician Progress Note      Zina Aguilar  CSN #:                  [id number]  :                       1950  ADMIT DATE:       10/15/2021 1:24 PM  [address] [place] DATE:  RESPONDING  PROVIDER #:        Tommy Camargo MD          QUERY TEXT:    Dear Attending,    Pt admitted with CVA, noted to have pneumonia. If possible, please document in progress notes and discharge summary the present on admission status of *** :    The medical record reflects the following:  Risk Factors: CVA, advanced age  Clinical Indicators: pt transfer from \[facility]\"" for eval of CVA  - CXR done 10/17 with findings of mild diffuse interstitial and airspace opacities could be due to viral or atypical pneumonia  - 10/17 PN with documentation of pna noted to probably be aspiration  Treatment: CXR, Rocephin 1 g IV, Flagyl 500 mg IV, monitoring      Thank you,    Delphine Crow, RN  [facility]  [phone]  Options provided:  -- Yes, pneumonia was present at the time of the order to admit to the hospital  -- No, pneumonia was not present on admission and developed during the inpatient stay  -- Other - I will add my own diagnosis  -- Disagree - Not applicable / Not valid  -- Disagree - Clinically unable to determine / Unknown  -- Refer to Clinical Documentation Reviewer    PROVIDER RESPONSE TEXT:    No, pneumonia was not present on admission and developed during the inpatient stay.     Query created by: Eric Magallon on 10/18/2021 11:53 AM      Electronically signed by:  Tommy Camargo MD 10/19/2021 10:37 AM

## 2021-10-19 NOTE — PROGRESS NOTES
Was notified patient's temperature 38.4 degrees. Patient being treated for aspiration pneumonia, now with acute cystitis with hematuria.   Follow-up blood cultures were drawn yesterday, urine culture pending  Antibiotics have been expanded to cefepime from ceftriaxone  Treat fever with Tylenol  We will continue to monitor      [address]

## 2021-10-19 NOTE — PROGRESS NOTES
2035:Bedside and Verbal shift change report given to Logan Dangelo RN (oncoming nurse) by Shawna Hull (offgoing nurse). Report included the following information SBAR, Kardex, Intake/Output, MAR, Recent Results, Med Rec Status and Cardiac Rhythm NSR.    0730: Bedside and Verbal shift change report given to Omayra Rousseau (oncoming nurse) by Chasidy Ayala RN (offgoing nurse). Report included the following information SBAR, Kardex, Intake/Output, MAR, Recent Results, Med Rec Status and Cardiac Rhythm NSR.

## 2021-10-19 NOTE — PROGRESS NOTES
Bedside and Verbal shift change report given to Renee (oncoming nurse) by Alma Rosa Crystal RN (offgoing nurse). Report included the following information SBAR, Kardex, ED Summary, Intake/Output, MAR, Recent Results, Cardiac Rhythm SR and Dual Neuro Assessment. Spoke with spouse at 2000 sharing a positive update; an increase in her talking, eye opening, and responsiveness that he had not see all day on his visit. He plans to visit again tomorrow at lunch time.

## 2021-10-19 NOTE — PROGRESS NOTES
Problem: TIA/CVA Stroke: Day 2 Until Discharge  Goal: Off Pathway (Use only if patient is Off Pathway)  Outcome: Progressing Towards Goal  Goal: Activity/Safety  Outcome: Progressing Towards Goal  Goal: Diagnostic Test/Procedures  Outcome: Progressing Towards Goal  Goal: Discharge Planning  Outcome: Progressing Towards Goal  Goal: Medications  Outcome: Progressing Towards Goal  Goal: Respiratory  Outcome: Progressing Towards Goal  Goal: Treatments/Interventions/Procedures  Outcome: Progressing Towards Goal  Goal: Psychosocial  Outcome: Progressing Towards Goal  Goal: *Verbalizes anxiety and depression are reduced or absent  Outcome: Progressing Towards Goal  Goal: *Absence of aspiration  Outcome: Progressing Towards Goal  Goal: *Absence of deep venous thrombosis signs and symptoms(Stroke Metric)  Outcome: Progressing Towards Goal  Goal: *Optimal pain control at patient's stated goal  Outcome: Progressing Towards Goal  Goal: *Stroke education continued(Stroke Metric)  Outcome: Progressing Towards Goal     Problem: Ischemic Stroke: Discharge Outcomes  Goal: *Verbalizes anxiety and depression are reduced or absent  Outcome: Progressing Towards Goal  Goal: *Verbalize understanding of risk factor modification(Stroke Metric)  Outcome: Progressing Towards Goal  Goal: *Hemodynamically stable  Outcome: Progressing Towards Goal  Goal: *Absence of aspiration pneumonia  Outcome: Progressing Towards Goal  Goal: *Aware of needed dietary changes  Outcome: Progressing Towards Goal  Goal: *Verbalize understanding of prescribed medications including anti-coagulants, anti-lipid, and/or anti-platelets(Stroke Metric)  Outcome: Progressing Towards Goal  Goal: *Aware of follow-up diagnostics related to anticoagulants  Outcome: Progressing Towards Goal  Goal: *Absence of DVT(Stroke Metric)  Outcome: Progressing Towards Goal  Goal: *Absence of aspiration  Outcome: Progressing Towards Goal  Goal: *Optimal pain control at patient's stated goal  Outcome: Progressing Towards Goal  Goal: *Home safety concerns addressed  Outcome: Progressing Towards Goal  Goal: *Describes available resources and support systems  Outcome: Progressing Towards Goal  Goal: *Verbalizes understanding of activation of EMS(911) for stroke symptoms(Stroke Metric)  Outcome: Progressing Towards Goal  Goal: *Understands and describes signs and symptoms to report to providers(Stroke Metric)  Outcome: Progressing Towards Goal  Goal: *Neurolgocially stable (absence of additional neurological deficits)  Outcome: Progressing Towards Goal  Goal: *Verbalizes importance of follow-up with primary care physician(Stroke Metric)  Outcome: Progressing Towards Goal  Goal: *Smoking cessation discussed,if applicable(Stroke Metric)  Outcome: Progressing Towards Goal  Goal: *Depression screening completed(Stroke Metric)  Outcome: Progressing Towards Goal     Problem: Falls - Risk of  Goal: *Absence of Falls  Description: Document Rose Mary Fall Risk and appropriate interventions in the flowsheet.   Outcome: Progressing Towards Goal  Note: Fall Risk Interventions:       Mentation Interventions: Bed/chair exit alarm (Aphasic)    Medication Interventions: Bed/chair exit alarm    Elimination Interventions: Bed/chair exit alarm              Problem: Patient Education: Go to Patient Education Activity  Goal: Patient/Family Education  Outcome: Progressing Towards Goal     Problem: Patient Education: Go to Patient Education Activity  Goal: Patient/Family Education  Outcome: Progressing Towards Goal     Problem: Patient Education: Go to Patient Education Activity  Goal: Patient/Family Education  Outcome: Progressing Towards Goal

## 2021-10-19 NOTE — PROGRESS NOTES
Problem: Dysphagia (Adult)  Goal: *Acute Goals and Plan of Care (Insert Text)  Description: Speech Pathology Goals    Initiated 10/18/2021  1. Patient will tolerate ice chips with no adverse effects within 7 days. 2. Patient will participate in swallow reassessment within 7 days. Outcome: Progressing Towards Goal     Problem: Communication Impaired (Adult)  Goal: *Acute Goals and Plan of Care (Insert Text)  Description: Speech Pathology Goals    Initiated 10/18/2021  1. Patient will respond to simple Yes/No questions with 80% accuracy within 7 days  2. Patient will follow simple one step commands with 80% accuracy within 7 days. 3. Patient will name simple objects of ADL with 80% accuracy given mod cues within 7 days. Outcome: Progressing Towards Goal   SPEECH LANGUAGE PATHOLOGY DYSPHAGIA AND SPEECH TREATMENT  Patient: Tien Brooke (54 y.o. female)  Date: 10/19/2021  Diagnosis: CVA (cerebral vascular accident) [facility]) [I63.9] CVA (cerebral vascular accident) [facility])       Precautions:   Fall, Skin    ASSESSMENT:  Patient alert throughout SLP visit this date. She demonstrated adequate bolus acceptance of ice chips and thins with suspected prolonged oral transit time. No overt s/s of aspiration. Minimal vocalizations and no command following. She continues to demonstrate severe receptive/expressive aphasia. Yes/no response is inconsistent with occasional \"yea\" or \"no\", otherwise minimal head nod or \"mmm\" vs \"mhmm\". Accuracy is inconsistent as well. She followed 0 single step commands. With attempts at naming, noted oral opening as if groping for speech, but no vocalization. PLAN:  Recommendations and Planned Interventions:  Continue NPO with ice chips. Following for swallow, will benefit from further testing  Following for language treatment. Patient continues to benefit from skilled intervention to address the above impairments. Continue treatment per established plan of care.   Discharge Recommendations: To Be Determined     SUBJECTIVE:   Patient stated Judy Flowers. OBJECTIVE:   Cognitive and Communication Status:  Neurologic State: Alert  Orientation Level: Unable to verbalize  Cognition: No command following  Perception: Cues to attend to right side of body  Perseveration: No perseveration noted (miimal verbalizations)  Safety/Judgement: Decreased awareness of environment, Decreased awareness of need for assistance, Decreased awareness of need for safety    Dysphagia Treatment and Interventions:  Oral Assessment:  Oral Assessment  Labial:  (no command following)  Oral Hygiene: moist  Lingual:  (no command following)  P.O. Trials:  Patient Position: upright in bed  Vocal quality prior to P.O.: No impairment  Consistency Presented: Ice chips; Thin liquid  How Presented: SLP-fed/presented;Spoon;Straw     Bolus Acceptance: No impairment  Bolus Formation/Control:  (prolonged oral manipulation)     Propulsion: Delayed (# of seconds)           Aspiration Signs/Symptoms: None                                                                                                                                                                                           Language Comprehension and Expression:     Verbal Expression  Verbal Expression  Naming: Impaired  Confrontation (%): 0 %  Sentence Completion: Impaired  Word level (%): 0 %  Comprehension  1 step commands 0%   Yes/no variable response 60% accuracy                                                     Pain:  Pain Scale 1: Visual  Pain Intensity 1: 0 (resting comfortably)       After treatment:   Patient left in no apparent distress in bed, Call bell within reach, and Nursing notified PT/OT present    COMMUNICATION/EDUCATION:   Patient was educated regarding purpose of SLP visit. Her comprehension is impaired. The patient's plan of care including recommendations, planned interventions, and recommended diet changes were discussed with: Registered nurse. [place] So, SLP  Time Calculation: 25 mins

## 2021-10-20 ENCOUNTER — APPOINTMENT (OUTPATIENT)
Dept: ULTRASOUND IMAGING | Age: 71
DRG: 064 | End: 2021-10-20
Attending: NURSE PRACTITIONER
Payer: MEDICARE

## 2021-10-20 LAB
ALBUMIN SERPL-MCNC: 2.4 G/DL (ref 3.5–5)
ALBUMIN/GLOB SERPL: 0.6 {RATIO} (ref 1.1–2.2)
ALP SERPL-CCNC: 85 U/L (ref 45–117)
ALT SERPL-CCNC: 34 U/L (ref 12–78)
ANION GAP SERPL CALC-SCNC: 5 MMOL/L (ref 5–15)
AST SERPL-CCNC: 24 U/L (ref 15–37)
BASOPHILS # BLD: 0 K/UL (ref 0–0.1)
BASOPHILS NFR BLD: 0 % (ref 0–1)
BILIRUB SERPL-MCNC: 0.6 MG/DL (ref 0.2–1)
BUN SERPL-MCNC: 18 MG/DL (ref 6–20)
BUN/CREAT SERPL: 38 (ref 12–20)
CALCIUM SERPL-MCNC: 8.5 MG/DL (ref 8.5–10.1)
CHLORIDE SERPL-SCNC: 100 MMOL/L (ref 97–108)
CO2 SERPL-SCNC: 30 MMOL/L (ref 21–32)
COMMENT, HOLDF: NORMAL
CREAT SERPL-MCNC: 0.48 MG/DL (ref 0.55–1.02)
CRP SERPL-MCNC: 14.8 MG/DL (ref 0–0.6)
D DIMER PPP FEU-MCNC: 2.7 MG/L FEU (ref 0–0.65)
DIFFERENTIAL METHOD BLD: ABNORMAL
EOSINOPHIL # BLD: 0.1 K/UL (ref 0–0.4)
EOSINOPHIL NFR BLD: 0 % (ref 0–7)
ERYTHROCYTE [DISTWIDTH] IN BLOOD BY AUTOMATED COUNT: 13.2 % (ref 11.5–14.5)
GLOBULIN SER CALC-MCNC: 3.8 G/DL (ref 2–4)
GLUCOSE SERPL-MCNC: 146 MG/DL (ref 65–100)
HCT VFR BLD AUTO: 37.8 % (ref 35–47)
HGB BLD-MCNC: 12.4 G/DL (ref 11.5–16)
IMM GRANULOCYTES # BLD AUTO: 0.1 K/UL (ref 0–0.04)
IMM GRANULOCYTES NFR BLD AUTO: 1 % (ref 0–0.5)
LYMPHOCYTES # BLD: 1.3 K/UL (ref 0.8–3.5)
LYMPHOCYTES NFR BLD: 10 % (ref 12–49)
MCH RBC QN AUTO: 31.2 PG (ref 26–34)
MCHC RBC AUTO-ENTMCNC: 32.8 G/DL (ref 30–36.5)
MCV RBC AUTO: 95 FL (ref 80–99)
MONOCYTES # BLD: 1.2 K/UL (ref 0–1)
MONOCYTES NFR BLD: 9 % (ref 5–13)
NEUTS SEG # BLD: 10.3 K/UL (ref 1.8–8)
NEUTS SEG NFR BLD: 80 % (ref 32–75)
NRBC # BLD: 0 K/UL (ref 0–0.01)
NRBC BLD-RTO: 0 PER 100 WBC
PLATELET # BLD AUTO: 289 K/UL (ref 150–400)
PMV BLD AUTO: 10 FL (ref 8.9–12.9)
POTASSIUM SERPL-SCNC: 3.4 MMOL/L (ref 3.5–5.1)
PROT SERPL-MCNC: 6.2 G/DL (ref 6.4–8.2)
RBC # BLD AUTO: 3.98 M/UL (ref 3.8–5.2)
RHEUMATOID FACT SERPL-ACNC: <10 IU/ML
SAMPLES BEING HELD,HOLD: NORMAL
SODIUM SERPL-SCNC: 135 MMOL/L (ref 136–145)
WBC # BLD AUTO: 13 K/UL (ref 3.6–11)

## 2021-10-20 PROCEDURE — 36415 COLL VENOUS BLD VENIPUNCTURE: CPT

## 2021-10-20 PROCEDURE — 74011250636 HC RX REV CODE- 250/636: Performed by: FAMILY MEDICINE

## 2021-10-20 PROCEDURE — 92526 ORAL FUNCTION THERAPY: CPT

## 2021-10-20 PROCEDURE — 74011250637 HC RX REV CODE- 250/637: Performed by: FAMILY MEDICINE

## 2021-10-20 PROCEDURE — 86038 ANTINUCLEAR ANTIBODIES: CPT

## 2021-10-20 PROCEDURE — 92507 TX SP LANG VOICE COMM INDIV: CPT

## 2021-10-20 PROCEDURE — 77030019905 HC CATH URETH INTMIT MDII -A

## 2021-10-20 PROCEDURE — 74011000250 HC RX REV CODE- 250: Performed by: FAMILY MEDICINE

## 2021-10-20 PROCEDURE — 74011250636 HC RX REV CODE- 250/636: Performed by: NURSE PRACTITIONER

## 2021-10-20 PROCEDURE — 74011000250 HC RX REV CODE- 250: Performed by: NURSE PRACTITIONER

## 2021-10-20 PROCEDURE — 87086 URINE CULTURE/COLONY COUNT: CPT

## 2021-10-20 PROCEDURE — 97112 NEUROMUSCULAR REEDUCATION: CPT

## 2021-10-20 PROCEDURE — 86431 RHEUMATOID FACTOR QUANT: CPT

## 2021-10-20 PROCEDURE — 97110 THERAPEUTIC EXERCISES: CPT

## 2021-10-20 PROCEDURE — 85379 FIBRIN DEGRADATION QUANT: CPT

## 2021-10-20 PROCEDURE — 76700 US EXAM ABDOM COMPLETE: CPT

## 2021-10-20 PROCEDURE — 86140 C-REACTIVE PROTEIN: CPT

## 2021-10-20 PROCEDURE — 74011250637 HC RX REV CODE- 250/637: Performed by: NURSE PRACTITIONER

## 2021-10-20 PROCEDURE — 80053 COMPREHEN METABOLIC PANEL: CPT

## 2021-10-20 PROCEDURE — 65660000000 HC RM CCU STEPDOWN

## 2021-10-20 PROCEDURE — 74011250637 HC RX REV CODE- 250/637: Performed by: INTERNAL MEDICINE

## 2021-10-20 PROCEDURE — 85025 COMPLETE CBC W/AUTO DIFF WBC: CPT

## 2021-10-20 PROCEDURE — 99232 SBSQ HOSP IP/OBS MODERATE 35: CPT | Performed by: NURSE PRACTITIONER

## 2021-10-20 RX ORDER — POTASSIUM CHLORIDE 7.45 MG/ML
10 INJECTION INTRAVENOUS
Status: DISPENSED | OUTPATIENT
Start: 2021-10-20 | End: 2021-10-20

## 2021-10-20 RX ORDER — VANCOMYCIN HYDROCHLORIDE
1250 EVERY 12 HOURS
Status: DISCONTINUED | OUTPATIENT
Start: 2021-10-20 | End: 2021-10-22

## 2021-10-20 RX ORDER — POTASSIUM CHLORIDE 7.45 MG/ML
10 INJECTION INTRAVENOUS ONCE
Status: COMPLETED | OUTPATIENT
Start: 2021-10-20 | End: 2021-10-20

## 2021-10-20 RX ADMIN — ATORVASTATIN CALCIUM 40 MG: 40 TABLET, FILM COATED ORAL at 09:07

## 2021-10-20 RX ADMIN — HYDROCHLOROTHIAZIDE 12.5 MG: 25 TABLET ORAL at 09:06

## 2021-10-20 RX ADMIN — CEFEPIME HYDROCHLORIDE 2 G: 2 INJECTION, POWDER, FOR SOLUTION INTRAVENOUS at 09:10

## 2021-10-20 RX ADMIN — POTASSIUM CHLORIDE 10 MEQ: 7.45 INJECTION INTRAVENOUS at 18:19

## 2021-10-20 RX ADMIN — CLOPIDOGREL BISULFATE 75 MG: 75 TABLET ORAL at 09:07

## 2021-10-20 RX ADMIN — METRONIDAZOLE 500 MG: 500 INJECTION, SOLUTION INTRAVENOUS at 21:33

## 2021-10-20 RX ADMIN — FAMOTIDINE 20 MG: 10 INJECTION, SOLUTION INTRAVENOUS at 09:08

## 2021-10-20 RX ADMIN — TICAGRELOR 30 MG: 60 TABLET ORAL at 18:19

## 2021-10-20 RX ADMIN — FAMOTIDINE 20 MG: 10 INJECTION, SOLUTION INTRAVENOUS at 21:33

## 2021-10-20 RX ADMIN — ASPIRIN 81 MG: 81 TABLET, COATED ORAL at 09:06

## 2021-10-20 RX ADMIN — VANCOMYCIN HYDROCHLORIDE 1250 MG: 10 INJECTION, POWDER, LYOPHILIZED, FOR SOLUTION INTRAVENOUS at 23:22

## 2021-10-20 RX ADMIN — LISINOPRIL 10 MG: 10 TABLET ORAL at 09:07

## 2021-10-20 RX ADMIN — VANCOMYCIN HYDROCHLORIDE 2500 MG: 10 INJECTION, POWDER, LYOPHILIZED, FOR SOLUTION INTRAVENOUS at 11:20

## 2021-10-20 RX ADMIN — POTASSIUM CHLORIDE 10 MEQ: 10 INJECTION, SOLUTION INTRAVENOUS at 11:19

## 2021-10-20 RX ADMIN — POTASSIUM CHLORIDE 10 MEQ: 10 INJECTION, SOLUTION INTRAVENOUS at 09:03

## 2021-10-20 RX ADMIN — CEFEPIME HYDROCHLORIDE 2 G: 2 INJECTION, POWDER, FOR SOLUTION INTRAVENOUS at 18:19

## 2021-10-20 RX ADMIN — CEFEPIME HYDROCHLORIDE 2 G: 2 INJECTION, POWDER, FOR SOLUTION INTRAVENOUS at 00:33

## 2021-10-20 RX ADMIN — METRONIDAZOLE 500 MG: 500 INJECTION, SOLUTION INTRAVENOUS at 09:23

## 2021-10-20 NOTE — PROGRESS NOTES
SLP Contact Note    SLP treatment complete. Pt cleared for pureed diet/thin liquids with 1:1 assistance. Pt with global non-fluent aphasia with component of apraxia of speech. Full note to follow.       Thank you,  AYAH GallardoEd, [id number] [facility]  Speech-Language Pathologist

## 2021-10-20 NOTE — PROGRESS NOTES
Pharmacist Note - Vancomycin Dosing    Consult provided for this 66 y.o. female for indication of aspiration PNA/ possible UTI. Antibiotic regimen(s): vanc + metronidazole + cefepime  Patient on vancomycin PTA? NO     Recent Labs     10/20/21  0336 10/19/21  1428 10/18/21  1038   WBC 13.0* 15.0* 16.4*   CREA 0.48* 0.50*  --    BUN 18 16  --      Frequency of BMP: daily through 10/23  Height: 170.2 cm  Weight: 123.4 kg  Est CrCl: 97.5 ml/min; UO: 0.3 ml/kg/hr (12 hr measurement)  Temp (24hrs), Av.1 °F (37.8 °C), Min:97.9 °F (36.6 °C), Max:101.5 °F (38.6 °C)    Cultures:  10/18 urine contaminated specimen - recommended recollection  10/18 blood- NGTD- prelim    MRSA Swab ordered (if applicable)? YES    The plan below is expected to result in a target range of AUC/DAMIAN 400-600    Therapy will be initiated with a loading dose of 2500 mg IV x 1 to be followed by a maintenance dose of 1250 mg IV every 12 hours for an anticipated AUC of 475 mg/L.hr. Pharmacy to follow patient daily and order levels / make dose adjustments as appropriate. *Vancomycin has been dosed used Bayesian kinetics software to target an AUC/DAMIAN of 400-600, which provides adequate exposure for an assumed infection due to MRSA with an DAMIAN of 1 or less while reducing the risk of nephrotoxicity as seen with traditional trough based dosing goals.

## 2021-10-20 NOTE — PROGRESS NOTES
Problem: Dysphagia (Adult)  Goal: *Acute Goals and Plan of Care (Insert Text)  Description: Speech Pathology Goals    Initiated 10/18/2021  1. Patient will tolerate ice chips with no adverse effects within 7 days. 2. Patient will participate in swallow reassessment within 7 days. Outcome: Progressing Towards Goal     Problem: Communication Impaired (Adult)  Goal: *Acute Goals and Plan of Care (Insert Text)  Description: Speech Pathology Goals    Initiated 10/18/2021  1. Patient will respond to simple Yes/No questions with 80% accuracy within 7 days  2. Patient will follow simple one step commands with 80% accuracy within 7 days. 3. Patient will name simple objects of ADL with 80% accuracy given mod cues within 7 days. Outcome: Progressing Towards Goal     SPEECH LANGUAGE PATHOLOGY DYSPHAGIA AND SPEECH TREATMENT  Patient: Sandra Irving (81 y.o. female)  Date: 10/20/2021  Diagnosis: CVA (cerebral vascular accident) [facility]) [I63.9] CVA (cerebral vascular accident) ([facility] [address]       Precautions:  Fall, Skin    ASSESSMENT:  Pt with improvement noted with PO trials on this date with no overt difficulty nor overt s/s of aspiration. Recommend pt initiate pureed diet/thin liquids with 1:1 assistance. Suspect swallow function to mirror mentation. Additionally, pt continues to present with severe expressive-receptive non-fluent aphasia most consistent with a global aphasia. Also suspect a component of apraxia given oral groping appreciated and marked difficulty initiating words. Pt unable to follow commands or answer simple yes/no questions.       PLAN:  Recommendations and Planned Interventions:  --pureed diet/thin liquids  --1:1 assistance  --meds in applesauce  --good oral care    Recommend utilizing the following strategies to facilitate communication:   -no communication board at this time  -use gestures or visual cues to follow commands      Patient continues to benefit from skilled intervention to address the above impairments. Continue treatment per established plan of care. Discharge Recommendations:  Inpatient Rehab     SUBJECTIVE:   Patient vocalized but did not verbalize. OBJECTIVE:   Cognitive and Communication Status:  Neurologic State: Alert  Orientation Level: Unable to verbalize  Cognition: No command following  Perception: Cues to attend right visual field  Perseveration: No perseveration noted  Safety/Judgement: Decreased insight into deficits    Dysphagia Treatment and Interventions:  Oral Assessment:  Oral Assessment  Labial: Decreased rate;Decreased seal  Dentition: Intact; Natural  Oral Hygiene: oral mucosa moist and clear of secretions  Lingual: No impairment  Velum: No impairment  Mandible: No impairment  P.O. Trials:  Patient Position: upright in bed   Vocal quality prior to P.O.: No impairment  Consistency Presented: Thin liquid;Puree  How Presented: Straw;Spoon     Bolus Acceptance: No impairment  Bolus Formation/Control: Impaired  Type of Impairment: Delayed  Propulsion: No impairment  Oral Residue: None  Initiation of Swallow: No impairment  Laryngeal Elevation: Functional  Aspiration Signs/Symptoms: None  Pharyngeal Phase Characteristics: No impairment, issues, or problems              Speech Treatment and Interventions: Motor Speech:    Apraxic Characteristics: Marked difficulty initiating speech; Visible/audible searching        Language Comprehension and Expression:  Auditory Comprehension   Auditory Impairment: Yes  Response to Basic Yes/No Questions (%): 0 %  One-Step Basic Commands (%): 0 %  Verbal Expression  Verbal Expression  Initiation: Impaired (%)  Automatic Speech Task: Impaired (comment)  Repetition: Impaired  Conversation: Non-fluent; Apraxic      Pain:  No non-verbal indicators of pain    After treatment:   Call bell within reach and Nursing notified    COMMUNICATION/EDUCATION:     The patient's plan of care including recommendations, planned interventions, and recommended diet changes were discussed with: Registered nurse.        Angel Delgado, SLP  Time Calculation: 16 mins

## 2021-10-20 NOTE — PROGRESS NOTES
[address] [facility] Adult  Hospitalist Group                                                                                          Hospitalist Progress Note  Sohail Johnson NP  Answering service: [phone] from in house phone        Date of Service:  10/20/2021  NAME:  Omar Johnson  :  [id number]  MRN:  [id number]      Admission Summary:   Per medical record, Omar Johnson is a 66 y.o. female who presents with aphasia    Patient presented to [facility] with aphasia.   No history can be obtained from the patient, apparently patient went to bed in normal state, woke up today morning with complete aphasia and right-sided weakness, she was unable to speak or stand, came to the ER, was found to have acute CVA and was requested to be admitted to the hospitalist service. No further history could be obtained from the patient or chart review    Interval history / Subjective:   Patient seen this morning on rounds. Patient smiling, no complaints the moment of the encounter. Has been cleared by SLP for oral intake. Remains febrile, temperature 38.1     Assessment & Plan:     Acute Left MCA ischemic stroke with right U/L weakness and aphasia -- not thrombectomy candidate as stroke completed at time of eval  CTA:  possible occlusion of the anterior division of  the left anterior cerebral artery, with a relative paucity of vessels in the  left frontal lobe anterior middle cerebral artery territory  MRI:  Moderate acute infarction of the anterior inferior left frontal lobe,  predominantly cortically based without significant superimposed hemorrhage, mass  Effect. Mild chronic microvascular ischemic change and mild cerebral atrophy.   Continuing atorvastatin  - Cards:  EKG NSR, echo not done, on tele  Continue aspirin  Neurology following, thank you recommendations  Physical therapy recommending SNF versus IPR  Working with OT  SLP working with patient, as been cleared for oral intake      Pneumonia  In setting of aphasia, concern for aspiration  COVID-19 PCR was negative  Continuing metronidazole  Continuing cefepime  Was febrile        Acute cystitis with hematuria  This was not present on admission  Urinalysis yesterday indicative of urinary tract infection  Continuing cefepime  Patient remains well, we will add vancomycin  Urine culture with multiple organisms, ID has contacted lab for type II predominant organisms      Sepsis  Present on admission  Did have leukocytosis and persistent fevers  Leukocytosis improving on the 18th with 16.4, down to 13.0 today  Dynamically stable  Unclear if due to aspiration pneumonia versus cystitis, treating both    Accelerated hypertension  Blood pressure currently controlled  Continuing hydrochlorothiazide  Continue lisinopril  We will continue to monitor      HLD    Continuing atorvastatin    Hypokalemia  Replete potassium          Code status: Full    DVT prophylaxis: SCDs    Care Plan discussed with: Patient/Family     Anticipated Disposition: [facility]/Rehab     Anticipated Discharge: 24 hours to 48 hours     Hospital Problems  Never Reviewed        Codes Class Noted POA    * (Principal) CVA (cerebral vascular accident) [facility]) ICD-10-CM: I63.9  ICD-9-CM: 434.91  10/15/2021 Unknown                Review of Systems:   Pertinent items are noted in HPI. Vital Signs:    Last 24hrs VS reviewed since prior progress note. Most recent are:  Visit Vitals  BP (!) 136/59   Pulse 69   Temp (!) 100.5 °F (38.1 °C)   Resp 22   Ht 5' 7\" (1.702 m)   Wt 123.4 kg (272 lb 0.8 oz)   SpO2 93%   BMI 42.61 kg/m²       No intake or output data in the 24 hours ending 10/20/21 4038     Physical Examination:             Constitutional:  No acute distress, averbal       Resp:  CTA bilaterally. No wheezing/rhonchi/rales. No accessory muscle use   CV:  Regular rhythm, normal rate, no murmurs, gallops, rubs    GI:  Soft, non distended, non tender.  Protuberant normoactive bowel sounds, no hepatosplenomegaly     Musculoskeletal:  No edema, warm, 2+ pulses throughout    Neurologic:  Right-sided hemiplegia, right facial weakness, aphasia            Data Review:    Review and/or order of clinical lab test      Labs:     Recent Labs     10/20/21  0336 10/19/21  1428   WBC 13.0* 15.0*   HGB 12.4 12.9   HCT 37.8 38.5    282     Recent Labs     10/20/21  0336 10/19/21  1428   * 134*   K 3.4* 3.4*    99   CO2 30 26   BUN 18 16   CREA 0.48* 0.50*   * 139*   CA 8.5 9.1     Recent Labs     10/20/21  0336 10/19/21  1428   ALT 34 29   AP 85 86   TBILI 0.6 0.8   TP 6.2* 6.6   ALB 2.4* 2.7*   GLOB 3.8 3.9     No results for input(s): INR, PTP, APTT, INREXT, INREXT in the last 72 hours. No results for input(s): FE, TIBC, PSAT, FERR in the last 72 hours. No results found for: FOL, RBCF   No results for input(s): PH, PCO2, PO2 in the last 72 hours. No results for input(s): CPK, CKNDX, TROIQ in the last 72 hours.     No lab exists for component: CPKMB  Lab Results   Component Value Date/Time    Cholesterol, total 162 10/16/2021 12:25 AM    HDL Cholesterol 39 10/16/2021 12:25 AM    LDL, calculated 102.6 (H) 10/16/2021 12:25 AM    Triglyceride 102 10/16/2021 12:25 AM    CHOL/HDL Ratio 4.2 10/16/2021 12:25 AM     Lab Results   Component Value Date/Time    Glucose (POC) 144 (H) 10/19/2021 07:02 AM    Glucose (POC) 157 (H) 10/19/2021 05:14 AM    Glucose (POC) 142 (H) 10/18/2021 10:37 PM    Glucose (POC) 155 (H) 10/18/2021 11:39 AM    Glucose (POC) 158 (H) 10/18/2021 05:47 AM     Lab Results   Component Value Date/Time    Color YELLOW/STRAW 10/18/2021 07:01 PM    Appearance CLOUDY (A) 10/18/2021 07:01 PM    Specific gravity 1.021 10/18/2021 07:01 PM    pH (UA) 5.5 10/18/2021 07:01 PM    Protein TRACE (A) 10/18/2021 07:01 PM    Glucose 100 (A) 10/18/2021 07:01 PM    Ketone Negative 10/18/2021 07:01 PM    Bilirubin Negative 10/18/2021 07:01 PM    Urobilinogen 0.2 10/18/2021 07:01 PM    Nitrites Positive (A) 10/18/2021 07:01 PM    Leukocyte Esterase MODERATE (A) 10/18/2021 07:01 PM    Epithelial cells MODERATE (A) 10/18/2021 07:01 PM    Bacteria 2+ (A) 10/18/2021 07:01 PM    WBC 20-50 10/18/2021 07:01 PM    RBC 5-10 10/18/2021 07:01 PM         Medications Reviewed:     Current Facility-Administered Medications   Medication Dose Route Frequency    potassium chloride 10 mEq in 100 ml IVPB  10 mEq IntraVENous Q1H    vancomycin (VANCOCIN) 2500 mg in  mL infusion  2,500 mg IntraVENous ONCE    Vancomycin - pharmacy to dose   Other Rx Dosing/Monitoring    vancomycin (VANCOCIN) 1250 mg in  ml infusion  1,250 mg IntraVENous Q12H    cefepime (MAXIPIME) 2 g in sterile water (preservative free) 10 mL IV syringe  2 g IntraVENous Q8H    metroNIDAZOLE (FLAGYL) IVPB premix 500 mg  500 mg IntraVENous Q12H    hydroCHLOROthiazide (HYDRODIURIL) tablet 12.5 mg  12.5 mg Oral DAILY    dextrose 5 % - 0.45% NaCl infusion  75 mL/hr IntraVENous CONTINUOUS    lisinopriL (PRINIVIL, ZESTRIL) tablet 10 mg  10 mg Oral DAILY    atorvastatin (LIPITOR) tablet 40 mg  40 mg Oral DAILY    aspirin delayed-release tablet 81 mg  81 mg Oral DAILY    acetaminophen (TYLENOL) tablet 650 mg  650 mg Oral Q4H PRN    Or    acetaminophen (TYLENOL) solution 650 mg  650 mg Per NG tube Q4H PRN    Or    acetaminophen (TYLENOL) suppository 650 mg  650 mg Rectal Q4H PRN    clopidogreL (PLAVIX) tablet 75 mg  75 mg Oral DAILY    famotidine (PF) (PEPCID) 20 mg in 0.9% sodium chloride 10 mL injection  20 mg IntraVENous Q12H     ______________________________________________________________________  EXPECTED LENGTH OF STAY: 4d 9h  ACTUAL LENGTH OF STAY:          Humberto Patel NP

## 2021-10-20 NOTE — PROGRESS NOTES
Neurology Progress Note  Conception ASHLEY Crain      Date of admission: 10/15/2021    Patient: Johnathan Linares MRN: [id number]  SSN: [id number]    YOB: 1950  Age: 66 y.o. Sex: female        Subjective:     HPI: Johnathan Linares is a 66 y.o. female with no known medical history who was found with increasing weakness and inability to produce words. She was transferred here from an outside hospital.  She remains aphasic and weak. It looks like she was on low-dose aspirin prior to admission. Head CT with MRI confirming left MCA territory infarcts in the posterior frontal lobe anterior temporal lobe. CTA unrevealing. LDL elevated at 102. Interval 10/20/21:   No events overnight. She remain with a headache      Review of systems  Unable to obtain     No past medical history on file. No past surgical history on file. No family history on file. Social History     Tobacco Use    Smoking status: Not on file   Substance Use Topics    Alcohol use: Not on file      Prior to Admission medications    Medication Sig Start Date End Date Taking? Authorizing Provider   atorvastatin (LIPITOR) 10 mg tablet Take 10 mg by mouth daily. Provider, Historical   aspirin delayed-release 81 mg tablet Take 81 mg by mouth daily. Provider, Historical   hydroCHLOROthiazide (HYDRODIURIL) 25 mg tablet Take 25 mg by mouth daily. Provider, Historical   cholecalciferol (VITAMIN D3) (1000 Units /25 mcg) tablet Take 1,000 Units by mouth daily. Provider, Historical   therapeutic multivitamin (THERAGRAN) tablet Take 1 Tablet by mouth daily. Provider, Historical   B.infantis-B.ani-B.long-B.bifi (Probiotic 4X) 10-15 mg TbEC Take  by mouth daily. Provider, Historical   naproxen (NAPROSYN) 500 mg tablet Take 500 mg by mouth two (2) times daily (with meals).     Provider, Historical     Current Facility-Administered Medications   Medication Dose Route Frequency Provider Last Rate Last Admin    potassium chloride 10 mEq in 100 ml IVPB  10 mEq IntraVENous Q1H Uziel Salazar  mL/hr at 10/20/21 1119 10 mEq at 10/20/21 1119    vancomycin (VANCOCIN) 2500 mg in  mL infusion  2,500 mg IntraVENous Hanane Kumar  mL/hr at 10/20/21 1120 2,500 mg at 10/20/21 1120    Vancomycin - pharmacy to dose   Other Rx Dosing/Monitoring Uziel Salazar NP        vancomycin (VANCOCIN) 1250 mg in  ml infusion  1,250 mg IntraVENous Q12H Uziel Salazar NP        ticagrelor [facility]) tablet 30 mg  30 mg Oral Q12H Vanessa Nicole NP        cefepime (MAXIPIME) 2 g in sterile water (preservative free) 10 mL IV syringe  2 g IntraVENous Q8H Shena Segura  mL/hr at 10/20/21 0910 2 g at 10/20/21 0910    metroNIDAZOLE (FLAGYL) IVPB premix 500 mg  500 mg IntraVENous Q12H Uziel Salazar  mL/hr at 10/20/21 0923 500 mg at 10/20/21 5002    hydroCHLOROthiazide (HYDRODIURIL) tablet 12.5 mg  12.5 mg Oral DAILY Jacqueline PALOMO MD   12.5 mg at 10/20/21 0906    dextrose 5 % - 0.45% NaCl infusion  75 mL/hr IntraVENous CONTINUOUS Jacqueline PALOMO MD 75 mL/hr at 10/19/21 0301 75 mL/hr at 10/19/21 0301    lisinopriL (PRINIVIL, ZESTRIL) tablet 10 mg  10 mg Oral DAILY Jacqueline PALOMO MD   10 mg at 10/20/21 1406    atorvastatin (LIPITOR) tablet 40 mg  40 mg Oral DAILY Jacqueline PALOMO MD   40 mg at 10/20/21 6378    aspirin delayed-release tablet 81 mg  81 mg Oral DAILY Jazzmine Carl MD   81 mg at 10/20/21 2249    acetaminophen (TYLENOL) tablet 650 mg  650 mg Oral Q4H PRN Jazzmine Carl MD   650 mg at 10/19/21 1440    Or    acetaminophen (TYLENOL) solution 650 mg  650 mg Per NG tube Q4H PRN Jazzmine Carl MD        Or   Velazquez acetaminophen (TYLENOL) suppository 650 mg  650 mg Rectal Q4H PRN Jazzmine Carl MD   650 mg at 10/19/21 0301    famotidine (PF) (PEPCID) 20 mg in 0.9% sodium chloride 10 mL injection  20 mg IntraVENous Q12H Jazzmine Carl MD   20 mg at 10/20/21 0908        No Known Allergies    Objective: Vitals:    10/20/21 0906 10/20/21 0950 10/20/21 1002 10/20/21 1200   BP: (!) 136/59 138/69     Pulse: 69 69 69 68   Resp:  22     Temp:  99 °F (37.2 °C)     SpO2:  93%          Temp (24hrs), Av.1 °F (37.8 °C), Min:97.9 °F (36.6 °C), Max:101.5 °F (38.6 °C)        O2 Device: None (Room air)       No intake or output data in the 24 hours ending 10/20/21 1206    General: In NAD. Cardiac: RRR  Lungs: Unlabored breathing. Abdomen: Soft/NT/non-distended. Extremities. No edema. Neurologic Exam:  Mental Status:  Alert and oriented x self     Language:    Expressive aphasia; response yes to most questions   Cranial Nerves:   Pupils equal, round and reactive to light. Left gaze preference      Extraocular movements intact w/o nystagmus      Facial sensation intact to LT     Left facial droop      Hearing grossly intact. Motor:    Bulk and tone normal.      Follow command on the left side, unable to lift  Leg on command but withdraws : Right side pain and flaccid with edema      No pronator drift. No involuntary movements. Sensation:        Coordination & Gait: Gait deferred    LABS:  Recent Labs     10/20/21  0336 10/19/21  1428 10/18/21  1038   WBC 13.0* 15.0* 16.4*   HGB 12.4 12.9 14.5   HCT 37.8 38.5 44.0    282 277     Recent Labs     10/20/21  0336 10/19/21  1428   * 134*   K 3.4* 3.4*    99   CO2 30 26   BUN 18 16   CREA 0.48* 0.50*   * 139*   CA 8.5 9.1     Recent Labs     10/20/21  0336 10/19/21  1428   ALT 34 29   AP 85 86   TBILI 0.6 0.8   TP 6.2* 6.6   ALB 2.4* 2.7*   GLOB 3.8 3.9     No results for input(s): INR, PTP, APTT, INREXT, INREXT in the last 72 hours. No results for input(s): PHI, PCO2I, PO2I, HCO3I in the last 72 hours. No results for input(s): CPK, CKNDX, TROIQ in the last 72 hours.     No lab exists for component: CPKMB  Lab Results   Component Value Date/Time    Cholesterol, total 162 10/16/2021 12:25 AM    HDL Cholesterol 39 10/16/2021 12:25 AM    LDL, calculated 102.6 (H) 10/16/2021 12:25 AM    Triglyceride 102 10/16/2021 12:25 AM    CHOL/HDL Ratio 4.2 10/16/2021 12:25 AM     Lab Results   Component Value Date/Time    Glucose (POC) 144 (H) 10/19/2021 07:02 AM    Glucose (POC) 157 (H) 10/19/2021 05:14 AM    Glucose (POC) 142 (H) 10/18/2021 10:37 PM    Glucose (POC) 155 (H) 10/18/2021 11:39 AM    Glucose (POC) 158 (H) 10/18/2021 05:47 AM     Lab Results   Component Value Date/Time    Color YELLOW/STRAW 10/18/2021 07:01 PM    Appearance CLOUDY (A) 10/18/2021 07:01 PM    Specific gravity 1.021 10/18/2021 07:01 PM    pH (UA) 5.5 10/18/2021 07:01 PM    Protein TRACE (A) 10/18/2021 07:01 PM    Glucose 100 (A) 10/18/2021 07:01 PM    Ketone Negative 10/18/2021 07:01 PM    Bilirubin Negative 10/18/2021 07:01 PM    Urobilinogen 0.2 10/18/2021 07:01 PM    Nitrites Positive (A) 10/18/2021 07:01 PM    Leukocyte Esterase MODERATE (A) 10/18/2021 07:01 PM    Epithelial cells MODERATE (A) 10/18/2021 07:01 PM    Bacteria 2+ (A) 10/18/2021 07:01 PM    WBC 20-50 10/18/2021 07:01 PM    RBC 5-10 10/18/2021 07:01 PM       No results for input(s): FE, TIBC, PSAT, FERR in the last 72 hours. No results found for: FOL, RBCF   No results for input(s): PH, PCO2, PO2 in the last 72 hours. No results for input(s): CPK, CKNDX, TROIQ in the last 72 hours. No lab exists for component: CPKMB    Imaging:  No results found. CT Results:  Results from Hospital Encounter encounter on 10/15/21    CT CODE NEURO PERF W CBF    Narrative  EXAM:  CT CODE NEURO PERF W CBF    INDICATION:  CVA transfer with LVO    COMPARISON:  CT head and CT angiogram of earlier in the day    TECHNIQUE:    CT brain perfusion was performed with generation of hemodynamic maps of multiple  parameters, including cerebral blood flow, cerebral volume, Tmax and MTT (mean  transit time). The study was performed following the IV injection of 50 cc  Isovue-370. This study was analyzed by the [address] ai algorithm.   CT dose reduction was achieved through the use of a standardized protocol  tailored for this examination and automatic exposure control for dose  modulation. FINDINGS:    CT PERFUSION:  There is diminished perfusion in the left middle cerebral artery territory in  the left frontal lobe. rCBF < 30% = 26 cc. Tmax > 6 seconds = 46 cc. Mismatch volume: 20 cc. Mismatch ratio: 1.8    Impression  Ischemia in the left middle cerebral artery territory in the left frontal lobe. 20 cc mismatch volume. Mismatch ratio 1.8. CT CODE NEURO HEAD WO CONTRAST    Narrative  EXAM: CT CODE NEURO HEAD WO CONTRAST    INDICATION: CVA transfer    COMPARISON: 1043 hours and 1030 hours. CONTRAST: None. TECHNIQUE: Unenhanced CT of the head was performed using 5 mm images. Brain and  bone windows were generated. Coronal and sagittal reformats. CT dose reduction  was achieved through use of a standardized protocol tailored for this  examination and automatic exposure control for dose modulation. FINDINGS:  The ventricles and sulci are normal in size, shape and configuration. . Air is  loss of the gray-white differentiation in the left posterior frontal and left  anterior parietal lobes (series 2, image 20). . There is a hyperdense left MCA. There is no intracranial hemorrhage, extra-axial collection, or mass effect. The  basilar cisterns are open. No CT evidence of acute infarct. The bone windows demonstrate no abnormalities. The visualized portions of the  paranasal sinuses and mastoid air cells are clear. Impression  Findings compatible with acute left MCA infarct. No evidence for parenchymal hemorrhage at this time.     This result was verbally relayed by me at 1341 hours to [place], [address]      Results from [place] encounter on 10/15/21    CTA [id number] [facility] CONT    Addendum 10/15/2021 12:08 PM  Addendum:    Review of the images demonstrates possible occlusion of the anterior division of  the left anterior cerebral artery, with a relative paucity of vessels in the  left frontal lobe anterior middle cerebral artery territory.:    Narrative  EXAM:  CTA CODE NEURO HEAD AND NECK W CONT    INDICATION:  Code Stroke    COMPARISON:  Noncontrast head CT of earlier in the day    TECHNIQUE:  Multislice helical axial CT angiography was performed from the aortic arch to  the top of the head during uneventful rapid bolus intravenous administration of  100 mL of Isovue 370. Postprocessing was performed to include MIP and  reformatted images. Standard images of the head were also obtained following  contrast administration and a delay. CT dose reduction was achieved through the use of a standardized protocol  tailored for this examination and automatic exposure control for dose  modulation. This study was analyzed by the [address] ai algorithm. FINDINGS:    HEAD CT:  The ventricles are normal in size and midline. .  There is no intracranial  hemorrhage or evolving infarct. . There is no abnormal enhancement. . .    CTA NECK:    Great vessels: Normal arch anatomy with the origins patent. Right subclavian artery: Patent    Left subclavian artery: Patent    Right common carotid artery: Patent    Left common carotid artery: Patent    Cervical right internal carotid artery: Patent with no significant stenosis by  NASCET criteria. Cervical left internal carotid artery: Patent with no significant stenosis by  NASCET criteria. Focal calcified plaque with less than 25% stenosis. Right vertebral artery: Patent    Left vertebral artery: Patent    No thyroid nodule. Lung apices clear. No cervical adenopathy. Degenerative  changes in the cervical spine. CTA HEAD:    Right cavernous internal carotid artery: Patent    Left cavernous internal carotid artery: Patent    Anterior cerebral arteries: Patent.  Hypoplastic absent right A1 segment with  supply of the right A2 segment via the anterior communicating artery. Anterior communicating artery: Patent    Right middle cerebral artery: Patent    Left middle cerebral artery: Patent    Posterior cerebral arteries: Patent. Fetal origin of the right posterior  cerebral artery. Absent right P1 segment. Patent left posterior cerebral artery. Posterior communicating arteries: No significant left posterior communicating  artery. Basilar artery: Patent    Distal vertebral arteries: Patent    No aneurysm or vascular malformation. Patency of the major venous sinuses and  deep venous system. Impression  1. No acute large vessel occlusion or flow-limiting stenosis. MRI Results:  Results from [place] encounter on 10/15/21    MRI BRAIN WO CONT    Narrative  *PRELIMINARY REPORT*    There is acute infarction in the gray matter of the left frontal lobe in an area  measuring approximately 5.4 x 2.3 cm. Preliminary report was provided by Dr. Kristina Jackson, the on-call radiologist, at 8:00 PM  on 10/15/2021    Final report to follow. *END PRELIMINARY REPORT*    CLINICAL HISTORY: Left MCA territory ischemia. INDICATION: Left MCA territory ischemia. COMPARISON: CTA perfusion and 1521    TECHNIQUE: MR examination of the brain includes axial and sagittal T1, coronal  T2, axial T2, axial FLAIR, axial gradient echo, axial DWI. CONTRAST: None    FINDINGS:  There is a moderate acute infarction in the anterior left frontal lobe  inferiorly. Infarct overlies the insular cortex. Mild periventricular and  scattered foci of increased T2 signal intensity and corona radiata and centrum  semiovale. Likely occluded anterior division branch vessel on the left. Minimal  sulcal and ventricular prominence. Infarct is predominantly cortical in  appearance. There is no significant superimposed hemorrhage midline shift or  mass effect. The brain architecture is normal. There is no evidence of midline  shift or mass-effect. There are no extra-axial fluid collections.  There is no  Chiari or syrinx. The pituitary and infundibulum are grossly unremarkable. There  is no skull base mass. Cerebellopontine angles are grossly unremarkable. The  major intracranial vascular flow-voids are unremarkable. The cavernous sinuses  are symmetric. Optic chiasm and infundibulum grossly unremarkable. Orbits are  grossly symmetric. Dural venous sinuses are grossly patent. Trace sphenoid sinus disease. The mastoid air cells are well pneumatized and clear. Impression  Moderate acute infarction of the anterior inferior left frontal lobe,  predominantly cortically based without significant superimposed hemorrhage, mass  effect. Mild chronic microvascular ischemic change and mild cerebral atrophy. XR Results   Results from [place] encounter on 10/15/21    XR CHEST PORT    Impression  No significant change. XR CHEST PORT    Impression  Mild diffuse interstitial and airspace opacities could be due to viral or  atypical pneumonia in the correct clinical setting. Pulmonary edema less likely. VAS/US Results (maximum last 3): No results found for this or any previous visit.       TTE        EKG  Results for orders placed or performed during the hospital encounter of 10/15/21   EKG, 12 LEAD, INITIAL   Result Value Ref Range    Ventricular Rate 66 BPM    Atrial Rate 66 BPM    P-R Interval 168 ms    QRS Duration 90 ms    Q-T Interval 436 ms    QTC Calculation (Bezet) 457 ms    Calculated P Axis 77 degrees    Calculated R Axis -26 degrees    Calculated T Axis 41 degrees    Diagnosis       Normal sinus rhythm  Normal ECG  No previous ECGs available         Hospital Problems  Never Reviewed        Codes Class Noted POA    * (Principal) CVA (cerebral vascular accident) [facility]) ICD-10-CM: I63.9  ICD-9-CM: 434.91  10/15/2021 Unknown              Assessment/Plan:     Vibha Jiang is a 66 y.o. female who presented with upper and lower extremity weakness and aphasia on the right side She was discovered to have a left MCA territory infarct. On examination, she remained aphasic with a headache. She was able to follow commands on the left , flaccid on the right with pain, some edema    Plan    -.6, continue Lipitor 40 mg nightly   -A1c 5.8, at goal   -ARU TEST 474;continue ASA 81 mg   ` -P2Y12  247; Switch Plavix to Brilinta 30mg BID; recheck P2Y12 in the AM    -echo, 55-60%;unable to obtain bubble study    -Right arm pain, will defer workup to primary team    -Tylenol 650 mg PRN as need for headaches    - Stroke education    -PT/OT/SLP     Thank you very much for this consultation. No further neurologic recommendations at this time. Thank you for this consult.     Signed By: Army Lauri NP     October 20, 2021 10:32 AM

## 2021-10-20 NOTE — PROGRESS NOTES
Transition of Care Plan   RUR- Low  13%   DISPOSITION: SNF - [facility] - pending medical stability   F/U with PCP/Specialist     Transport: [facility]     CM discussed [facility] transport quote with patient's spouse. He gave this CM permission to pay pre-trip cost. Confirmation code [id number] provided to [facility]. BILL Chaudhry.

## 2021-10-20 NOTE — PROGRESS NOTES
Problem: Self Care Deficits Care Plan (Adult)  Goal: *Acute Goals and Plan of Care (Insert Text)  Description: FUNCTIONAL STATUS PRIOR TO ADMISSION: Patient is globally aphasic, unable to provide PLOF, limited information in chart with indication of a spouse. HOME SUPPORT: Unclear per chart review    Occupational Therapy Goals  Initiated 10/16/2021   1. Patient will perform self-feeding with maximal assistance within 7 day(s). 2.  Patient will perform grooming with moderate assistance within 7 day(s). 3.  Patient will perform lower body dressing with maximal assistance within 7 day(s). 4.  Patient will perform toilet transfers to/from [facility] with maximal assistance x2 within 7 day(s). 5.  Patient will perform all aspects of toileting with maximal assistance within 7 day(s). 6.  Patient will participate in upper extremity therapeutic exercise/activities with supervision/set-up within 7 day(s). 7.  Patient will utilize energy conservation techniques during functional activities with verbal and visual cues within 7 day(s). 8.  Patient will complete the Fugl Lafleur UE Assessment within 7 days. Outcome: Not Progressing Towards Goal    OCCUPATIONAL THERAPY TREATMENT  Patient: Johnathan Linares (87 y.o. female)  Date: 10/20/2021  Diagnosis: CVA (cerebral vascular accident) [facility]) [I63.9] CVA (cerebral vascular accident) [facility])       Precautions: Fall, Skin  Chart, occupational therapy assessment, plan of care, and goals were reviewed. ASSESSMENT  Patient continues with skilled OT services and is progressing towards goals. Patient remains limited by aphasia, R hemiplegia, L gaze preference (+ L cervical rotation), and diffuse pain. Patient was able to to scan to R with prompting and achieved midline cervical rotation with assistance. Command following very limited despite multimodal cuing.   Attempted to encourage unaffected L side functional use but patient grimaced/ moaned with most AAROM, especially at L shoulder. Required total assistance to wash face and total assistance x2 to reposition in bed. Recommend SNF at d/c. Current Level of Function Impacting Discharge (ADLs): total assistance          PLAN :  Patient continues to benefit from skilled intervention to address the above impairments. Continue treatment per established plan of care to address goals. Recommendation for discharge: (in order for the patient to meet his/her long term goals)  Therapy up to 5 days/week in SNF setting    This discharge recommendation:  Has been made in collaboration with the attending provider and/or case management         SUBJECTIVE:   Patient stated Yes.     OBJECTIVE DATA SUMMARY:   Cognitive/Behavioral Status:  Neurologic State: Alert  Orientation Level: Unable to verbalize  Cognition: No command following  Perception: Cues to attend right visual field;Cues to attend to right side of body     Safety/Judgement: Decreased insight into deficits    ADL Intervention:        Grooming: Total assistance to wash face. Grasped with L hand but grimaced/ resisted raising to face. Provided total assistance to engage RUE. Cognitive Retraining  Safety/Judgement: Decreased insight into deficits    Pain:  Patient did not verbalize pain but grimaced with most AAROM    Activity Tolerance:   Limited by pain    After treatment patient left in no apparent distress:   Supine in bed, Call bell within reach, and Bed / chair alarm activated    COMMUNICATION/COLLABORATION:   The patients plan of care was discussed with: Physical therapist and Registered nurse.      Manolo Cisneros OT  Time Calculation: 20 mins

## 2021-10-20 NOTE — PROGRESS NOTES
Problem: Mobility Impaired (Adult and Pediatric)  Goal: *Acute Goals and Plan of Care (Insert Text)  Description: Note: FUNCTIONAL STATUS PRIOR TO ADMISSION:  Per documentation, it is inferred patient was independent PLOF however patient is aphasic and unable to provide medical history     HOME SUPPORT PRIOR TO ADMISSION: The patient lived with . Physical Therapy Goals  Initiated 10/16/2021  1. Patient will move from supine to sit and sit to supine , scoot up and down, and roll side to side in bed with moderate assistance  within 7 day(s). 2.  Patient will transfer from bed to chair and chair to bed with maximal assistance using the least restrictive device within 7 day(s). 3.  Patient will perform sit to stand with maximal assistance within 7 day(s). Outcome: Progressing Towards Goal   PHYSICAL THERAPY TREATMENT  Patient: Suzan Carpenter (34 y.o. female)  Date: 10/20/2021  Diagnosis: CVA (cerebral vascular accident) [facility]) [I63.9] CVA (cerebral vascular accident) [facility])       Precautions: Fall, Skin  Chart, physical therapy assessment, plan of care and goals were reviewed. ASSESSMENT  Patient continues with skilled PT services and is not progressing towards goals. Pt received supine in bed and agreeable to therapy. Pt tolerated session fairly. Pt continues to be limited by impaired speech, limited command following, R hemiplegia, L cervical rotation preference, decreased functional mobility, decreased tolerance to activity, limited mobility due grimacing with ROM to all joints (L shoulder greatly limited). Pt required total A for scooting and repositioning in the bed. Provided manual therapy to L trap and L cervical spinal muscles while also manually stretching. Pt's head appeared to be in a more midline/neutral position post manual therapy and placed a pillow to maintain position.  Pt will continue to benefit from SNF placement upon discharge to continue therapy efforts    Current Level of Function Impacting Discharge (mobility/balance): total A    Other factors to consider for discharge: pt independent         PLAN :  Patient continues to benefit from skilled intervention to address the above impairments. Continue treatment per established plan of care. to address goals. Recommendation for discharge: (in order for the patient to meet his/her long term goals)  Therapy up to 5 days/week in SNF setting    This discharge recommendation:  Has been made in collaboration with the attending provider and/or case management    IF patient discharges home will need the following DME: to be determined (TBD)       SUBJECTIVE:   Patient stated Ok.     OBJECTIVE DATA SUMMARY:   Critical Behavior:  Neurologic State: Alert  Orientation Level: Unable to verbalize  Cognition: No command following  Safety/Judgement: Decreased insight into deficits  Functional Mobility Training:  Bed Mobility:           Scooting: Total assistance           Balance:  Sitting: Impaired  Ambulation/Gait Training:                 Therapeutic Exercises:   Manual stretching and manual therapy to L upper trap and c-spine musculature; PROM to RLE, AAROM LLE. Limited by joint stiffness and pt grimaced throughout  Pain Rating:  Pt grimaced during AAROM of most all joints    Activity Tolerance:   Fair and Poor    After treatment patient left in no apparent distress:   Supine in bed, Call bell within reach, Bed / chair alarm activated, and Side rails x 3    COMMUNICATION/COLLABORATION:   The patients plan of care was discussed with: Occupational therapist and Registered nurse.      Rosanna Stuart, PT, DPT   Time Calculation: 18 mins

## 2021-10-20 NOTE — CONSULTS
Infectious Disease Consult    Today's Date: 10/20/2021   Admit Date: 10/15/2021    Impression:   ? Asp PNA  Fever  ? UTI  - T-max 101.3, WBC 13    U/A (10/18) WBC 20-50 with 2+ bacteria    Blood cx (10/18) no growth so far    Urine cx (10/18) pending    CXR (10/18) Mild interstitial/airspace opacities and small left pleural effusion similar to prior study. US ABD (-)     D-dimer 2.7, CRP 14.8    RF (-), ROBERT pending  Plan:   - Continue with IV cefepime, flagyl, and vancomycin    Adjust ABX prn; will continue to follow outstanding cx    Pt did not c/o headache with me, but c/o headache according neurology note - recommend LP if pt remain febrile and c/o headache tomorrow    Elevated D-dimer with recent stroke - recommend to r/o PE    Fever work up if temp >= 100.4    Above plan of care discussed and agreed with Dr. Oscar Rom:   · IV cefepime 10/19, flagyl 10/18, vancomycin 10/20    Subjective:   Date of Consultation:  October 20, 2021  Referring Physician: Cory Anglin NP    Patient is a 66 y.o. female was transferred from [facility] for further evaluation of aphasia. Head CT with MRI confirming left MCA territory infarcts in the posterior frontal lobe anterior temporal lobe. Pt is currently following by neurology team.    Pt started running a fever on 10/18, CXR revealed Mild interstitial/airspace opacities and small left pleural effusion. Pt was started on IV cefepime and flagyl per primary team. Blood cx so far no growth. Urine cx is pending. During exam, Pt is alert, aphasic,follow some commands on the left, and right side flaccid. Moans during position change. Pt has hx of bilateral knee surgeries and has been receiving steroid injection for capsulitis of right shoulder at [address]. Pt has no skin lesions. Pt is poor historian. Above information obtained from chart review. No known medication allergies.   COVID test was negative    ID team was consulted for fever evaluation and treatment. Patient Active Problem List   Diagnosis Code    CVA (cerebral vascular accident) ([facility] [address] I63.9     No past medical history on file. No family history on file. Social History     Tobacco Use    Smoking status: Not on file   Substance Use Topics    Alcohol use: Not on file     No past surgical history on file. Prior to Admission medications    Medication Sig Start Date End Date Taking? Authorizing Provider   atorvastatin (LIPITOR) 10 mg tablet Take 10 mg by mouth daily. Provider, Historical   aspirin delayed-release 81 mg tablet Take 81 mg by mouth daily. Provider, Historical   hydroCHLOROthiazide (HYDRODIURIL) 25 mg tablet Take 25 mg by mouth daily. Provider, Historical   cholecalciferol (VITAMIN D3) (1000 Units /25 mcg) tablet Take 1,000 Units by mouth daily. Provider, Historical   therapeutic multivitamin (THERAGRAN) tablet Take 1 Tablet by mouth daily. Provider, Historical   B.infantis-B.ani-B.long-B.bifi (Probiotic 4X) 10-15 mg TbEC Take  by mouth daily. Provider, Historical   naproxen (NAPROSYN) 500 mg tablet Take 500 mg by mouth two (2) times daily (with meals). Provider, Historical     a  No Known Allergies     REVIEW OF SYSTEMS:     Total of 12 systems reviewed as follows:   I am not able to complete the review of systems because:    The patient is intubated and sedated    The patient has altered mental status due to his acute medical problems       y The patient has baseline aphasia from prior stroke(s)    The patient has baseline dementia and is not reliable historian                 POSITIVE= underlined text  Negative = text not underlined  General:  fever, chills, sweats, generalized weakness, weight loss/gain,      loss of appetite   Eyes:    blurred vision, eye pain, loss of vision, double vision  ENT:    rhinorrhea, pharyngitis   Respiratory:   cough, sputum production, SOB, wheezing, MASON, pleuritic pain   Cardiology:   chest pain, palpitations, orthopnea, PND, edema, syncope   Gastrointestinal:  abdominal pain , N/V, dysphagia, diarrhea, constipation, bleeding   Genitourinary:  frequency, urgency, dysuria, hematuria, incontinence   Muskuloskeletal :  arthralgia, myalgia   Hematology:  easy bruising, nose or gum bleeding, lymphadenopathy   Dermatological: rash, ulceration, pruritis   Endocrine:   hot flashes or polydipsia   Neurological:  headache, dizziness, confusion, focal weakness, paresthesia,     Speech difficulties, memory loss, gait disturbance  Psychological: Feelings of anxiety, depression, agitation    Objective:     Visit Vitals  BP (!) 136/59   Pulse 69   Temp (!) 100.5 °F (38.1 °C)   Resp 22   Ht 5' 7\" (1.702 m)   Wt 123.4 kg (272 lb 0.8 oz)   SpO2 93%   BMI 42.61 kg/m²     Temp (24hrs), Av.1 °F (37.8 °C), Min:97.9 °F (36.6 °C), Max:101.5 °F (38.6 °C)       Lines: peripheral line    PHYSICAL EXAM:   General:          Obese, Alert, cooperative, no distress, appears stated age. HEENT: Atraumatic, anicteric sclerae, pink conjunctivae     No oral ulcers, mucosa dry  Neck:  Supple  Lungs:   Clear in apex with diminished breath sounds at bases. No Wheezing or Rhonchi. No rales. Chest wall:  No tenderness  No Accessory muscle use. Heart:   Regular  rhythm,  No  murmur   Trace of pitting edema  Abdomen:   Soft, non-tender. Distended/obese. Bowel sounds normal  Extremities: No cyanosis. No clubbing  Skin:     Not pale. Not Jaundiced  No rashes   Psych:  Unable to assess insight.    Neurologic: Pupils are reactive to light, aphasia, Alert and oriented X self only, right side flaccid      Data Review:     CBC:  Recent Labs     10/20/21  0336 10/19/21  1428 10/18/21  1038 10/18/21  1038   WBC 13.0* 15.0*  --  16.4*   GRANS 80* 79*   < > 84*   MONOS 9 10   < > 8   EOS 0 0   < > 0   ANEU 10.3* 11.8*   < > 13.8*   ABL 1.3 1.5   < > 1.1   HGB 12.4 12.9  --  14.5   HCT 37.8 38.5  --  44.0    282  --  277    < > = values in this interval not displayed. BMP:  Recent Labs     10/20/21  0336 10/19/21  1428   CREA 0.48* 0.50*   BUN 18 16   * 134*   K 3.4* 3.4*    99   CO2 30 26   AGAP 5 9   * 139*       LFTS:  Recent Labs     10/20/21  0336 10/19/21  1428   TBILI 0.6 0.8   ALT 34 29   AP 85 86   TP 6.2* 6.6   ALB 2.4* 2.7*       Microbiology:     All Micro Results     Procedure Component Value Units Date/Time    CULTURE, URINE [[id number]]     Order Status: Sent Specimen: Urine from Clean catch     CULTURE, MRSA [[id number]]     Order Status: Sent Specimen: Nasal from Nares     CULTURE, BLOOD [[id number]] Collected: 10/18/21 1850    Order Status: Completed Specimen: Blood Updated: 10/20/21 0504     Special Requests: NO SPECIAL REQUESTS        Culture result: NO GROWTH 2 DAYS       CULTURE, URINE [[id number]] Collected: 10/18/21 1901    Order Status: Completed Specimen: Urine Updated: 10/19/21 1302     Special Requests: --        NO SPECIAL REQUESTS  Reflexed from [id number]       Culture result:       >2 ORGANISMS - CONTAMINATED SPECIMEN.  SUGGEST RECOLLECTION          RESPIRATORY VIRUS PANEL W/COVID-19, PCR [[id number]] Collected: 10/17/21 1823    Order Status: Completed Specimen: Nasopharyngeal Updated: 10/17/21 2212     Adenovirus Not detected        Coronavirus 229E Not detected        Coronavirus HKU1 Not detected        Coronavirus CVNL63 Not detected        Coronavirus OC43 Not detected        SARS-CoV-2, PCR Not detected        Metapneumovirus Not detected        Rhinovirus and Enterovirus Not detected        Influenza A Not detected        Influenza A, subtype H1 Not detected        Influenza A, subtype H3 Not detected        INFLUENZA A H1N1 PCR Not detected        Influenza B Not detected        Parainfluenza 1 Not detected        Parainfluenza 2 Not detected        Parainfluenza 3 Not detected        Parainfluenza virus 4 Not detected        RSV by PCR Not detected        B. parapertussis, PCR Not detected        Bordetella pertussis - PCR Not detected        Chlamydophila pneumoniae DNA, QL, PCR Not detected        Mycoplasma pneumoniae DNA, QL, PCR Not detected       URINE CULTURE HOLD SAMPLE [[id number]]     Order Status: Canceled Specimen: Urine     COVID-19 RAPID TEST [[id number]] Collected: 10/15/21 1345    Order Status: Completed Specimen: Nasopharyngeal Updated: 10/15/21 1413     Specimen source Nasopharyngeal        COVID-19 rapid test Not detected        Comment: Rapid Abbott ID Now       Rapid NAAT:  The specimen is NEGATIVE for SARS-CoV-2, the novel coronavirus associated with COVID-19. Negative results should be treated as presumptive and, if inconsistent with clinical signs and symptoms or necessary for patient management, should be tested with an alternative molecular assay. Negative results do not preclude SARS-CoV-2 infection and should not be used as the sole basis for patient management decisions. This test has been authorized by the FDA under an Emergency Use Authorization (EUA) for use by authorized laboratories.    Fact sheet for Healthcare Providers: ConventionUpdate.co.nz  Fact sheet for Patients: ConventionUpdate.co.nz       Methodology: Isothermal Nucleic Acid Amplification                 Signed By: Kaleigh Good NP     October 20, 2021

## 2021-10-21 ENCOUNTER — APPOINTMENT (OUTPATIENT)
Dept: CT IMAGING | Age: 71
DRG: 064 | End: 2021-10-21
Attending: FAMILY MEDICINE
Payer: MEDICARE

## 2021-10-21 ENCOUNTER — APPOINTMENT (OUTPATIENT)
Dept: GENERAL RADIOLOGY | Age: 71
DRG: 064 | End: 2021-10-21
Attending: FAMILY MEDICINE
Payer: MEDICARE

## 2021-10-21 LAB
ALBUMIN SERPL-MCNC: 2.4 G/DL (ref 3.5–5)
ALBUMIN/GLOB SERPL: 0.6 {RATIO} (ref 1.1–2.2)
ALP SERPL-CCNC: 99 U/L (ref 45–117)
ALT SERPL-CCNC: 48 U/L (ref 12–78)
ANA SER QL: NEGATIVE
ANION GAP SERPL CALC-SCNC: 5 MMOL/L (ref 5–15)
AST SERPL-CCNC: 35 U/L (ref 15–37)
BACTERIA SPEC CULT: NORMAL
BASOPHILS # BLD: 0 K/UL (ref 0–0.1)
BASOPHILS NFR BLD: 0 % (ref 0–1)
BILIRUB SERPL-MCNC: 0.8 MG/DL (ref 0.2–1)
BUN SERPL-MCNC: 18 MG/DL (ref 6–20)
BUN/CREAT SERPL: 43 (ref 12–20)
CALCIUM SERPL-MCNC: 8.4 MG/DL (ref 8.5–10.1)
CHLORIDE SERPL-SCNC: 101 MMOL/L (ref 97–108)
CO2 SERPL-SCNC: 30 MMOL/L (ref 21–32)
CREAT SERPL-MCNC: 0.42 MG/DL (ref 0.55–1.02)
DIFFERENTIAL METHOD BLD: ABNORMAL
EOSINOPHIL # BLD: 0.1 K/UL (ref 0–0.4)
EOSINOPHIL NFR BLD: 1 % (ref 0–7)
ERYTHROCYTE [DISTWIDTH] IN BLOOD BY AUTOMATED COUNT: 13.3 % (ref 11.5–14.5)
GLOBULIN SER CALC-MCNC: 4 G/DL (ref 2–4)
GLUCOSE BLD STRIP.AUTO-MCNC: 139 MG/DL (ref 65–117)
GLUCOSE BLD STRIP.AUTO-MCNC: 144 MG/DL (ref 65–117)
GLUCOSE SERPL-MCNC: 142 MG/DL (ref 65–100)
HCT VFR BLD AUTO: 38 % (ref 35–47)
HGB BLD-MCNC: 12.6 G/DL (ref 11.5–16)
IMM GRANULOCYTES # BLD AUTO: 0.1 K/UL (ref 0–0.04)
IMM GRANULOCYTES NFR BLD AUTO: 1 % (ref 0–0.5)
LYMPHOCYTES # BLD: 1.4 K/UL (ref 0.8–3.5)
LYMPHOCYTES NFR BLD: 11 % (ref 12–49)
MCH RBC QN AUTO: 31.1 PG (ref 26–34)
MCHC RBC AUTO-ENTMCNC: 33.2 G/DL (ref 30–36.5)
MCV RBC AUTO: 93.8 FL (ref 80–99)
MONOCYTES # BLD: 1.4 K/UL (ref 0–1)
MONOCYTES NFR BLD: 11 % (ref 5–13)
NEUTS SEG # BLD: 9.9 K/UL (ref 1.8–8)
NEUTS SEG NFR BLD: 76 % (ref 32–75)
NRBC # BLD: 0 K/UL (ref 0–0.01)
NRBC BLD-RTO: 0 PER 100 WBC
PLATELET # BLD AUTO: 347 K/UL (ref 150–400)
PMV BLD AUTO: 9.8 FL (ref 8.9–12.9)
POTASSIUM SERPL-SCNC: 3.6 MMOL/L (ref 3.5–5.1)
PROT SERPL-MCNC: 6.4 G/DL (ref 6.4–8.2)
RBC # BLD AUTO: 4.05 M/UL (ref 3.8–5.2)
SERVICE CMNT-IMP: ABNORMAL
SERVICE CMNT-IMP: ABNORMAL
SERVICE CMNT-IMP: NORMAL
SERVICE CMNT-IMP: NORMAL
SODIUM SERPL-SCNC: 136 MMOL/L (ref 136–145)
WBC # BLD AUTO: 12.8 K/UL (ref 3.6–11)

## 2021-10-21 PROCEDURE — 74011000636 HC RX REV CODE- 636: Performed by: FAMILY MEDICINE

## 2021-10-21 PROCEDURE — 82962 GLUCOSE BLOOD TEST: CPT

## 2021-10-21 PROCEDURE — 92507 TX SP LANG VOICE COMM INDIV: CPT

## 2021-10-21 PROCEDURE — 74011250636 HC RX REV CODE- 250/636: Performed by: FAMILY MEDICINE

## 2021-10-21 PROCEDURE — 74011250637 HC RX REV CODE- 250/637: Performed by: FAMILY MEDICINE

## 2021-10-21 PROCEDURE — 65660000000 HC RM CCU STEPDOWN

## 2021-10-21 PROCEDURE — 74011000250 HC RX REV CODE- 250: Performed by: NURSE PRACTITIONER

## 2021-10-21 PROCEDURE — 85025 COMPLETE CBC W/AUTO DIFF WBC: CPT

## 2021-10-21 PROCEDURE — 80053 COMPREHEN METABOLIC PANEL: CPT

## 2021-10-21 PROCEDURE — 73502 X-RAY EXAM HIP UNI 2-3 VIEWS: CPT

## 2021-10-21 PROCEDURE — 74011250636 HC RX REV CODE- 250/636: Performed by: NURSE PRACTITIONER

## 2021-10-21 PROCEDURE — 92526 ORAL FUNCTION THERAPY: CPT

## 2021-10-21 PROCEDURE — 74011250637 HC RX REV CODE- 250/637: Performed by: INTERNAL MEDICINE

## 2021-10-21 PROCEDURE — 51798 US URINE CAPACITY MEASURE: CPT

## 2021-10-21 PROCEDURE — 71275 CT ANGIOGRAPHY CHEST: CPT

## 2021-10-21 PROCEDURE — 74011250637 HC RX REV CODE- 250/637: Performed by: NURSE PRACTITIONER

## 2021-10-21 PROCEDURE — 74011000250 HC RX REV CODE- 250: Performed by: FAMILY MEDICINE

## 2021-10-21 PROCEDURE — 36415 COLL VENOUS BLD VENIPUNCTURE: CPT

## 2021-10-21 PROCEDURE — 70450 CT HEAD/BRAIN W/O DYE: CPT

## 2021-10-21 RX ORDER — LISINOPRIL 20 MG/1
20 TABLET ORAL DAILY
Status: DISCONTINUED | OUTPATIENT
Start: 2021-10-21 | End: 2021-10-22

## 2021-10-21 RX ADMIN — FAMOTIDINE 20 MG: 10 INJECTION, SOLUTION INTRAVENOUS at 21:20

## 2021-10-21 RX ADMIN — METRONIDAZOLE 500 MG: 500 INJECTION, SOLUTION INTRAVENOUS at 21:20

## 2021-10-21 RX ADMIN — IOPAMIDOL 100 ML: 755 INJECTION, SOLUTION INTRAVENOUS at 13:27

## 2021-10-21 RX ADMIN — TICAGRELOR 30 MG: 60 TABLET ORAL at 19:14

## 2021-10-21 RX ADMIN — VANCOMYCIN HYDROCHLORIDE 1250 MG: 10 INJECTION, POWDER, LYOPHILIZED, FOR SOLUTION INTRAVENOUS at 11:07

## 2021-10-21 RX ADMIN — VANCOMYCIN HYDROCHLORIDE 1250 MG: 10 INJECTION, POWDER, LYOPHILIZED, FOR SOLUTION INTRAVENOUS at 23:25

## 2021-10-21 RX ADMIN — ASPIRIN 81 MG: 81 TABLET, COATED ORAL at 10:44

## 2021-10-21 RX ADMIN — ACETAMINOPHEN 650 MG: 325 TABLET ORAL at 05:25

## 2021-10-21 RX ADMIN — FAMOTIDINE 20 MG: 10 INJECTION, SOLUTION INTRAVENOUS at 10:46

## 2021-10-21 RX ADMIN — ATORVASTATIN CALCIUM 40 MG: 40 TABLET, FILM COATED ORAL at 10:44

## 2021-10-21 RX ADMIN — CEFEPIME HYDROCHLORIDE 2 G: 2 INJECTION, POWDER, FOR SOLUTION INTRAVENOUS at 19:14

## 2021-10-21 RX ADMIN — HYDROCHLOROTHIAZIDE 12.5 MG: 25 TABLET ORAL at 10:44

## 2021-10-21 RX ADMIN — CEFEPIME HYDROCHLORIDE 2 G: 2 INJECTION, POWDER, FOR SOLUTION INTRAVENOUS at 10:46

## 2021-10-21 RX ADMIN — LISINOPRIL 20 MG: 20 TABLET ORAL at 10:46

## 2021-10-21 RX ADMIN — TICAGRELOR 30 MG: 60 TABLET ORAL at 05:25

## 2021-10-21 RX ADMIN — CEFEPIME HYDROCHLORIDE 2 G: 2 INJECTION, POWDER, FOR SOLUTION INTRAVENOUS at 01:55

## 2021-10-21 RX ADMIN — METRONIDAZOLE 500 MG: 500 INJECTION, SOLUTION INTRAVENOUS at 10:47

## 2021-10-21 NOTE — PROGRESS NOTES
requesting J&J covid vaccine for Mrs. Kwadwo Carrasquillo. Prefers J&J one dose prior to transfer to Rehab. He feels he would not be able to follow up with [facility]z second dose. Fact sheet for J&J given to  and opportunity provided for questions and clarification. Attestation signed by . Dr. Juan robbins served for permission to vaccinate. Discharge tenative for 12 noon tomorrow.

## 2021-10-21 NOTE — PROGRESS NOTES
Spiritual Care Assessment/Progress Note  [facility]      NAME: Luli Blanco      MRN: [id number]  AGE: 66 y.o. SEX: female  Mandaeism Affiliation: Alissaantonio Doshi   Language: English     10/21/2021     Total Time (in minutes): 16     Spiritual Assessment begun in [address] through conversation with:         [x]Patient        [] Family    [] Friend(s)        Reason for Consult: Crisis     Spiritual beliefs: (Please include comment if needed)     [] Identifies with a hay tradition:         [] Supported by a hay community:            [] Claims no spiritual orientation:           [] Seeking spiritual identity:                [] Adheres to an individual form of spirituality:           [x] Not able to assess:                           Identified resources for coping:      [] Prayer                               [] Music                  [] Guided Imagery     [] Family/friends                 [] Pet visits     [] Devotional reading                         [x] Unknown     [] Other:                                              Interventions offered during this visit: (See comments for more details)    Patient Interventions: Crisis     Family/Friend(s): Spiritual care volunteer support     Plan of Care:     [] Support spiritual and/or cultural needs    [] Support AMD and/or advance care planning process      [] Support grieving process   [] Coordinate Rites and/or Rituals    [] Coordination with community clergy   [] No spiritual needs identified at this time   [] Detailed Plan of Care below (See Comments)  [] Make referral to Music Therapy  [] Make referral to Pet Therapy     [] Make referral to Addiction services  [] Make referral to [facility]  [] Make referral to Spiritual Care Partner  [] No future visits requested        [] Follow up upon further referrals     Comments: Responded to RRT in ER CT Room 2. Patient was alone; no family present.  Provided ministry of presence and collaborated with staff.     Visited by: Sylvia Cazares.  [place] [address] paging Service [phone])

## 2021-10-21 NOTE — PROGRESS NOTES
Problem: Dysphagia (Adult)  Goal: *Acute Goals and Plan of Care (Insert Text)  Description: Speech Pathology Goals    Initiated 10/18/2021  1. Patient will tolerate ice chips with no adverse effects within 7 days. 2. Patient will participate in swallow reassessment within 7 days. Outcome: Progressing Towards Goal     Problem: Communication Impaired (Adult)  Goal: *Acute Goals and Plan of Care (Insert Text)  Description: Speech Pathology Goals    Initiated 10/18/2021  1. Patient will respond to simple Yes/No questions with 80% accuracy within 7 days  2. Patient will follow simple one step commands with 80% accuracy within 7 days. 3. Patient will name simple objects of ADL with 80% accuracy given mod cues within 7 days. Outcome: Progressing Towards Goal     SPEECH LANGUAGE PATHOLOGY DYSPHAGIA AND SPEECH TREATMENT  Patient: Braxton Barry (8 y.o. female)  Date: 10/21/2021  Diagnosis: CVA (cerebral vascular accident) [facility]) [I63.9] CVA (cerebral vascular accident) ([facility] [address]       Precautions:  Fall, Skin    ASSESSMENT:  Pt tolerating pureed diet/thin liquids without difficulty, however, appetite has been poor. Would recommend continuing diet as outlined below with 1:1 assistance and encouragement to eat/drink. Prognosis of swallow function likely to mirror neurologic improvement. Pt also continues to present with severe expressive-receptive aphasia on this date, although pt was able to verbalize a few words and follow a few very simple commands. Deficits increased as difficulty increased. Pt also with component of apraxia of speech with oral groping, inconsistent errors, and marked difficulty initiating speech. Will continue to benefit from further SLP.       PLAN:  Recommendations and Planned Interventions:  --pureed diet/thin liquids  --1:1 assistance  --fully upright  --small sips/bites  --alternate liquids/solids    Recommend utilizing the following strategies to facilitate patient's functional communication, expressive language, receptive language, and command following   -- Provide patient with visual cues   -- Allow patient extra time to process and respond  -- Do not use communication board as this is inaccurate given pt's receptive language deficits. Patient continues to benefit from skilled intervention to address the above impairments. Continue treatment per established plan of care. Discharge Recommendations:  Inpatient Rehab     SUBJECTIVE:   Patient stated, \"Yes when asked if her name is Travon. OBJECTIVE:   Cognitive and Communication Status:  Neurologic State: Alert  Orientation Level: Unable to verbalize  Cognition: Decreased command following  Perception: Cues to attend right visual field, Cues to attend to right side of body  Perseveration: No perseveration noted  Safety/Judgement: Decreased insight into deficits    Dysphagia Treatment and Interventions:  Oral Assessment:  Oral Assessment  Labial: Decreased rate;Decreased seal  Dentition: Intact; Natural  Lingual: Decreased rate;Decreased strength  Velum: No impairment  P.O. Trials:  Patient Position: upright in bed  Vocal quality prior to P.O.:    Consistency Presented: Thin liquid;Puree  How Presented: SLP-fed/presented;Straw;Spoon     Bolus Acceptance: No impairment  Bolus Formation/Control: Impaired  Type of Impairment: Delayed  Propulsion: No impairment  Oral Residue: None  Initiation of Swallow: No impairment  Laryngeal Elevation: Functional  Aspiration Signs/Symptoms: None  Pharyngeal Phase Characteristics: No impairment, issues, or problems            Speech Treatment and Interventions: Motor Speech:    Apraxic Characteristics: Abnormal prosodic features; Awareness of errors;Marked difficulty initiating speech;Numerous attempts at the word;Visible/audible searching        Language Comprehension and Expression:  Auditory Comprehension   Auditory Impairment: Yes  Response to Basic Yes/No Questions (%): 20 %  One-Step Basic Commands (%): 20 %  Verbal Expression  Verbal Expression  Initiation: Impaired (%)  Automatic Speech Task: Impaired (comment)  Repetition: Impaired  Conversation: Non-fluent; Apraxic      After treatment:   Call bell within reach and Nursing notified    COMMUNICATION/EDUCATION:     The patient's plan of care including recommendations, planned interventions, and recommended diet changes were discussed with: Registered nurse.        Issac Boxer, SLP  Time Calculation: 15 mins

## 2021-10-21 NOTE — PROGRESS NOTES
Transition of Care Plan   RUR- Low 13%   DISPOSITION: SNF - [facility] - tomorrow, pending medical stability   F/U with PCP/Specialist     Transport: [facility] scheduled for 12pm 10/22    Per Dr. Carl Cobian, patient likely medically stable tomorrow for DC. Message left for Addis Roman in Admissions with [facility] about discharge tomorrow. [facility] scheduled for 12pm tomorrow. 2:00pm: Message x2 left for Admissions at [facility]. 2:30pm: CM received confirmation from Addis [phone], at [facility] that patient is OK to DC tomorrow afternoon. [facility] has requested UAI, covid test within 24 hours, med list, DC summary and AVS when available. Mya Gamez, M.S.W.

## 2021-10-21 NOTE — PROGRESS NOTES
[address] [facility] Adult  Hospitalist Group                                                                                          Hospitalist Progress Note  King Rodriguze MD  Answering service: [phone] from in house phone        Date of Service:  10/21/2021  NAME:  Sam Roblero  :    MRN:  [id number]      Admission Summary:   Per medical record, Sam Roblero is a 66 y.o. female who presents with aphasia    Patient presented to [facility] with aphasia.   No history can be obtained from the patient, apparently patient went to bed in normal state, woke up today morning with complete aphasia and right-sided weakness, she was unable to speak or stand, came to the ER, was found to have acute CVA and was requested to be admitted to the hospitalist service. No further history could be obtained from the patient or chart review    Interval history / Subjective:   Patient seen this morning on rounds. Aphasic     Assessment & Plan:     Acute Left MCA ischemic stroke with right U/L weakness and aphasia -- not thrombectomy candidate as stroke completed at time of eval  CTA:  possible occlusion of the anterior division of  the left anterior cerebral artery, with a relative paucity of vessels in the  left frontal lobe anterior middle cerebral artery territory  MRI:  Moderate acute infarction of the anterior inferior left frontal lobe,  predominantly cortically based without significant superimposed hemorrhage, mass  Effect. Mild chronic microvascular ischemic change and mild cerebral atrophy.   Continuing atorvastatin  - Cards:  EKG NSR, echo not done, on tele  Continue aspirin  Plavix switched to [address] per neurology, P2 Y 12 levels pending  Neurology following, thank you recommendations  Physical therapy recommending SNF versus IPR  Working with OT  SLP working with patient, as been cleared for oral intake      Pneumonia  In setting of aphasia, concern for aspiration  COVID-19 PCR was negative  Continuing metronidazole  Continuing cefepime and vancomycin, appreciate ID input  Afebrile  Aspiration precaution      Acute cystitis with hematuria  This was not present on admission  Urinalysis yesterday indicative of urinary tract infection  Continuing cefepime  Patient remains well, we will add vancomycin  Urine culture with multiple organisms, ID has contacted lab for type II predominant organisms      Sepsis  Present on admission  Did have leukocytosis and persistent fevers  Leukocytosis improving on the 18th with 16.4, down to 13.0 today  Dynamically stable  Unclear if due to aspiration pneumonia versus cystitis, treating both    Accelerated hypertension  Blood pressure currently controlled  Continuing hydrochlorothiazide  Increased to lisinopril 20 mg daily  We will continue to monitor      HLD    Continuing atorvastatin    Hypokalemia  Resolving    Called her  left a voicemail      Code status: Full    DVT prophylaxis: SCDs    Care Plan discussed with: Patient/Family     Anticipated Disposition: [facility]/Rehab     Anticipated Discharge: 24 hours to 48 hours       Patient had a rapid response called in the CT due to aphasia, patient with baseline dysarthria, and expressive aphasia, I went and evaluated the patient, appears to be baseline, rapid response canceled, will repeat a CT of the head     Hospital Problems  Never Reviewed        Codes Class Noted POA    * (Principal) CVA (cerebral vascular accident) [facility]) ICD-10-CM: I63.9  ICD-9-CM: 434.91  10/15/2021 Unknown                Review of Systems:   Pertinent items are noted in HPI. Vital Signs:    Last 24hrs VS reviewed since prior progress note.  Most recent are:  Visit Vitals  BP (!) 168/72   Pulse 75   Temp 98.2 °F (36.8 °C)   Resp 18   Ht 5' 7\" (1.702 m)   Wt 122.7 kg (270 lb 8.1 oz)   SpO2 94%   BMI 42.37 kg/m²         Intake/Output Summary (Last 24 hours) at 10/21/2021 2134  Last data filed at 10/21/2021 0611  Gross per 24 hour   Intake    Output 1800 ml   Net -1800 ml        Physical Examination:             Constitutional:  No acute distress, averbal       Resp:  CTA bilaterally. No wheezing/rhonchi/rales. No accessory muscle use   CV:  Regular rhythm, normal rate, no murmurs, gallops, rubs    GI:  Soft, non distended, non tender. Protuberant normoactive bowel sounds, no hepatosplenomegaly     Musculoskeletal:  No edema, warm, 2+ pulses throughout    Neurologic:  Right-sided hemiplegia, right facial weakness, aphasia            Data Review:    Review and/or order of clinical lab test      Labs:     Recent Labs     10/21/21  0224 10/20/21  0336   WBC 12.8* 13.0*   HGB 12.6 12.4   HCT 38.0 37.8    289     Recent Labs     10/21/21  0224 10/20/21  0336 10/19/21  1428    135* 134*   K 3.6 3.4* 3.4*    100 99   CO2 30 30 26   BUN 18 18 16   CREA 0.42* 0.48* 0.50*   * 146* 139*   CA 8.4* 8.5 9.1     Recent Labs     10/21/21  0224 10/20/21  0336 10/19/21  1428   ALT 48 34 29   AP 99 85 86   TBILI 0.8 0.6 0.8   TP 6.4 6.2* 6.6   ALB 2.4* 2.4* 2.7*   GLOB 4.0 3.8 3.9     No results for input(s): INR, PTP, APTT, INREXT, INREXT in the last 72 hours. No results for input(s): FE, TIBC, PSAT, FERR in the last 72 hours. No results found for: FOL, RBCF   No results for input(s): PH, PCO2, PO2 in the last 72 hours. No results for input(s): CPK, CKNDX, TROIQ in the last 72 hours.     No lab exists for component: CPKMB  Lab Results   Component Value Date/Time    Cholesterol, total 162 10/16/2021 12:25 AM    HDL Cholesterol 39 10/16/2021 12:25 AM    LDL, calculated 102.6 (H) 10/16/2021 12:25 AM    Triglyceride 102 10/16/2021 12:25 AM    CHOL/HDL Ratio 4.2 10/16/2021 12:25 AM     Lab Results   Component Value Date/Time    Glucose (POC) 144 (H) 10/19/2021 07:02 AM    Glucose (POC) 157 (H) 10/19/2021 05:14 AM    Glucose (POC) 142 (H) 10/18/2021 10:37 PM    Glucose (POC) 155 (H) 10/18/2021 11:39 AM    Glucose (POC) 158 (H) 10/18/2021 05:47 AM     Lab Results   Component Value Date/Time    Color YELLOW/STRAW 10/18/2021 07:01 PM    Appearance CLOUDY (A) 10/18/2021 07:01 PM    Specific gravity 1.021 10/18/2021 07:01 PM    pH (UA) 5.5 10/18/2021 07:01 PM    Protein TRACE (A) 10/18/2021 07:01 PM    Glucose 100 (A) 10/18/2021 07:01 PM    Ketone Negative 10/18/2021 07:01 PM    Bilirubin Negative 10/18/2021 07:01 PM    Urobilinogen 0.2 10/18/2021 07:01 PM    Nitrites Positive (A) 10/18/2021 07:01 PM    Leukocyte Esterase MODERATE (A) 10/18/2021 07:01 PM    Epithelial cells MODERATE (A) 10/18/2021 07:01 PM    Bacteria 2+ (A) 10/18/2021 07:01 PM    WBC 20-50 10/18/2021 07:01 PM    RBC 5-10 10/18/2021 07:01 PM         Medications Reviewed:     Current Facility-Administered Medications   Medication Dose Route Frequency    lisinopriL (PRINIVIL, ZESTRIL) tablet 20 mg  20 mg Oral DAILY    Vancomycin - pharmacy to dose   Other Rx Dosing/Monitoring    vancomycin (VANCOCIN) 1250 mg in  ml infusion  1,250 mg IntraVENous Q12H    ticagrelor (BRILINTA) tablet 30 mg  30 mg Oral Q12H    cefepime (MAXIPIME) 2 g in sterile water (preservative free) 10 mL IV syringe  2 g IntraVENous Q8H    metroNIDAZOLE (FLAGYL) IVPB premix 500 mg  500 mg IntraVENous Q12H    hydroCHLOROthiazide (HYDRODIURIL) tablet 12.5 mg  12.5 mg Oral DAILY    dextrose 5 % - 0.45% NaCl infusion  75 mL/hr IntraVENous CONTINUOUS    atorvastatin (LIPITOR) tablet 40 mg  40 mg Oral DAILY    aspirin delayed-release tablet 81 mg  81 mg Oral DAILY    acetaminophen (TYLENOL) tablet 650 mg  650 mg Oral Q4H PRN    Or    acetaminophen (TYLENOL) solution 650 mg  650 mg Per NG tube Q4H PRN    Or    acetaminophen (TYLENOL) suppository 650 mg  650 mg Rectal Q4H PRN    famotidine (PF) (PEPCID) 20 mg in 0.9% sodium chloride 10 mL injection  20 mg IntraVENous Q12H     ______________________________________________________________________  EXPECTED LENGTH OF STAY: 4d 9h  ACTUAL LENGTH OF STAY:          Clyde De Anda MD

## 2021-10-21 NOTE — PROGRESS NOTES
ID Progress Note  10/21/2021    Subjective:     Aphasia  Afebrile overnight    Review of Systems:            Symptom Y/N Comments   Symptom Y/N Comments   Fever/Chills       Chest Pain        Poor Appetite       Edema        Cough       Abdominal Pain        Sputum       Joint Pain        SOB/MASON       Pruritis/Rash        Nausea/vomit       Tolerating PT/OT        Diarrhea       Tolerating Diet        Constipation       Other           Could NOT obtain due to:       Objective:     Vitals:   Visit Vitals  BP (!) 168/72   Pulse 65   Temp 98.2 °F (36.8 °C)   Resp 18   Ht 5' 7\" (1.702 m)   Wt 122.7 kg (270 lb 8.1 oz)   SpO2 94%   BMI 42.37 kg/m²        Tmax:  Temp (24hrs), Av.5 °F (36.9 °C), Min:97.6 °F (36.4 °C), Max:99 °F (37.2 °C)      PHYSICAL EXAM:  General: Chronically ill appearing, WD, WN. Alert, no acute distress    EENT:  EOMI. Anicteric sclerae. Dry mucous membrane  Resp:  Clear in apex with decrease of breath sounds at bases, No accessory muscle use  CV:  Regular  rhythm,  trace of pitting edema  GI:  Soft, Non distended, Non tender. +Bowel sounds  Neurologic:  Alert, Pupils are reactive to light, aphasia, Alert and oriented X self only, right side flaccid  Psych:   Unable to assess insight. Not anxious nor agitated  Skin:  No rashes.   No jaundice    Labs:   Lab Results   Component Value Date/Time    WBC 12.8 (H) 10/21/2021 02:24 AM    HGB 12.6 10/21/2021 02:24 AM    HCT 38.0 10/21/2021 02:24 AM    PLATELET 147  02:24 AM    MCV 93.8 10/21/2021 02:24 AM     Lab Results   Component Value Date/Time    Sodium 136 10/21/2021 02:24 AM    Potassium 3.6 10/21/2021 02:24 AM    Chloride 101 10/21/2021 02:24 AM    CO2 30 10/21/2021 02:24 AM    Anion gap 5 10/21/2021 02:24 AM    Glucose 142 (H) 10/21/2021 02:24 AM    BUN 18 10/21/2021 02:24 AM    Creatinine 0.42 (L) 10/21/2021 02:24 AM    BUN/Creatinine ratio 43 (H) 10/21/2021 02:24 AM    GFR est AA >60 10/21/2021 02:24 AM    GFR est non-AA >60 10/21/2021 02:24 AM    Calcium 8.4 (L) 10/21/2021 02:24 AM    Bilirubin, total 0.8 10/21/2021 02:24 AM    Alk. phosphatase 99 10/21/2021 02:24 AM    Protein, total 6.4 10/21/2021 02:24 AM    Albumin 2.4 (L) 10/21/2021 02:24 AM    Globulin 4.0 10/21/2021 02:24 AM    A-G Ratio 0.6 (L) 10/21/2021 02:24 AM    ALT (SGPT) 48 10/21/2021 02:24 AM       Assessment and Plan   Asp PNA  Fever (resolved)  UTI  - afebrile overnight, WBC 13->12.8    Blood cx (10/18) no growth so far    Urine cx (10/18) enterococcus species & GNR    CXR (10/18) Mild interstitial/airspace opacities and small left pleural effusion similar to prior study. US ABD (-)     CTA of chest (10/21) There is no pulmonary embolism. There is no aortic aneurysm or dissection. Minimal coronary artery disease and minimal dependent atelectasis. Imaging findings consistent with prior granulomatous infection.     Right hip x-ray (-)    D-dimer 2.7, CRP 14.8;    RF (-), ROBERT (-)      Continue with IV cefepime, flagyl, and vancomycin    Adjust ABX prn    Fever work up if temp >= 100.4        Above plan of care discussed and agreed with Dr. Ro Caruso, NP

## 2021-10-22 LAB
ALBUMIN SERPL-MCNC: 2.4 G/DL (ref 3.5–5)
ALBUMIN/GLOB SERPL: 0.6 {RATIO} (ref 1.1–2.2)
ALP SERPL-CCNC: 94 U/L (ref 45–117)
ALT SERPL-CCNC: 35 U/L (ref 12–78)
ANION GAP SERPL CALC-SCNC: 9 MMOL/L (ref 5–15)
AST SERPL-CCNC: 19 U/L (ref 15–37)
BACTERIA SPEC CULT: ABNORMAL
BACTERIA SPEC CULT: ABNORMAL
BASOPHILS # BLD: 0.1 K/UL (ref 0–0.1)
BASOPHILS NFR BLD: 0 % (ref 0–1)
BILIRUB SERPL-MCNC: 0.6 MG/DL (ref 0.2–1)
BUN SERPL-MCNC: 17 MG/DL (ref 6–20)
BUN/CREAT SERPL: 39 (ref 12–20)
CALCIUM SERPL-MCNC: 8.7 MG/DL (ref 8.5–10.1)
CC UR VC: ABNORMAL
CHLORIDE SERPL-SCNC: 100 MMOL/L (ref 97–108)
CO2 SERPL-SCNC: 29 MMOL/L (ref 21–32)
COVID-19 RAPID TEST, COVR: NOT DETECTED
CREAT SERPL-MCNC: 0.44 MG/DL (ref 0.55–1.02)
DIFFERENTIAL METHOD BLD: ABNORMAL
EOSINOPHIL # BLD: 0.1 K/UL (ref 0–0.4)
EOSINOPHIL NFR BLD: 1 % (ref 0–7)
ERYTHROCYTE [DISTWIDTH] IN BLOOD BY AUTOMATED COUNT: 13.2 % (ref 11.5–14.5)
GLOBULIN SER CALC-MCNC: 3.8 G/DL (ref 2–4)
GLUCOSE BLD STRIP.AUTO-MCNC: 120 MG/DL (ref 65–117)
GLUCOSE SERPL-MCNC: 134 MG/DL (ref 65–100)
HCT VFR BLD AUTO: 37.6 % (ref 35–47)
HGB BLD-MCNC: 12.3 G/DL (ref 11.5–16)
IMM GRANULOCYTES # BLD AUTO: 0.1 K/UL (ref 0–0.04)
IMM GRANULOCYTES NFR BLD AUTO: 1 % (ref 0–0.5)
LYMPHOCYTES # BLD: 1.5 K/UL (ref 0.8–3.5)
LYMPHOCYTES NFR BLD: 12 % (ref 12–49)
MCH RBC QN AUTO: 31.3 PG (ref 26–34)
MCHC RBC AUTO-ENTMCNC: 32.7 G/DL (ref 30–36.5)
MCV RBC AUTO: 95.7 FL (ref 80–99)
MONOCYTES # BLD: 1.4 K/UL (ref 0–1)
MONOCYTES NFR BLD: 11 % (ref 5–13)
NEUTS SEG # BLD: 9.9 K/UL (ref 1.8–8)
NEUTS SEG NFR BLD: 75 % (ref 32–75)
NRBC # BLD: 0 K/UL (ref 0–0.01)
NRBC BLD-RTO: 0 PER 100 WBC
PLATELET # BLD AUTO: 380 K/UL (ref 150–400)
PMV BLD AUTO: 9.6 FL (ref 8.9–12.9)
POTASSIUM SERPL-SCNC: 3.4 MMOL/L (ref 3.5–5.1)
PROT SERPL-MCNC: 6.2 G/DL (ref 6.4–8.2)
RBC # BLD AUTO: 3.93 M/UL (ref 3.8–5.2)
SERVICE CMNT-IMP: ABNORMAL
SERVICE CMNT-IMP: ABNORMAL
SODIUM SERPL-SCNC: 138 MMOL/L (ref 136–145)
SOURCE, COVRS: NORMAL
VANCOMYCIN SERPL-MCNC: 15.7 UG/ML
WBC # BLD AUTO: 13.1 K/UL (ref 3.6–11)

## 2021-10-22 PROCEDURE — 80202 ASSAY OF VANCOMYCIN: CPT

## 2021-10-22 PROCEDURE — 97112 NEUROMUSCULAR REEDUCATION: CPT

## 2021-10-22 PROCEDURE — 74011000258 HC RX REV CODE- 258: Performed by: NURSE PRACTITIONER

## 2021-10-22 PROCEDURE — 74011000250 HC RX REV CODE- 250: Performed by: FAMILY MEDICINE

## 2021-10-22 PROCEDURE — 85025 COMPLETE CBC W/AUTO DIFF WBC: CPT

## 2021-10-22 PROCEDURE — 97535 SELF CARE MNGMENT TRAINING: CPT

## 2021-10-22 PROCEDURE — 74011000250 HC RX REV CODE- 250: Performed by: NURSE PRACTITIONER

## 2021-10-22 PROCEDURE — 74011250636 HC RX REV CODE- 250/636: Performed by: NURSE PRACTITIONER

## 2021-10-22 PROCEDURE — 74011250636 HC RX REV CODE- 250/636: Performed by: FAMILY MEDICINE

## 2021-10-22 PROCEDURE — 97110 THERAPEUTIC EXERCISES: CPT

## 2021-10-22 PROCEDURE — 80053 COMPREHEN METABOLIC PANEL: CPT

## 2021-10-22 PROCEDURE — 74011000250 HC RX REV CODE- 250: Performed by: INTERNAL MEDICINE

## 2021-10-22 PROCEDURE — 36415 COLL VENOUS BLD VENIPUNCTURE: CPT

## 2021-10-22 PROCEDURE — 74011250637 HC RX REV CODE- 250/637: Performed by: FAMILY MEDICINE

## 2021-10-22 PROCEDURE — 65660000000 HC RM CCU STEPDOWN

## 2021-10-22 PROCEDURE — 97530 THERAPEUTIC ACTIVITIES: CPT

## 2021-10-22 PROCEDURE — 74011250637 HC RX REV CODE- 250/637: Performed by: INTERNAL MEDICINE

## 2021-10-22 PROCEDURE — 92507 TX SP LANG VOICE COMM INDIV: CPT

## 2021-10-22 PROCEDURE — 74011250637 HC RX REV CODE- 250/637: Performed by: NURSE PRACTITIONER

## 2021-10-22 PROCEDURE — 87635 SARS-COV-2 COVID-19 AMP PRB: CPT

## 2021-10-22 PROCEDURE — 82962 GLUCOSE BLOOD TEST: CPT

## 2021-10-22 PROCEDURE — 92526 ORAL FUNCTION THERAPY: CPT

## 2021-10-22 RX ORDER — POTASSIUM CHLORIDE 14.9 MG/ML
10 INJECTION INTRAVENOUS ONCE
Status: COMPLETED | OUTPATIENT
Start: 2021-10-22 | End: 2021-10-22

## 2021-10-22 RX ORDER — VANCOMYCIN/0.9 % SOD CHLORIDE 1.5G/250ML
1500 PLASTIC BAG, INJECTION (ML) INTRAVENOUS EVERY 12 HOURS
Status: DISCONTINUED | OUTPATIENT
Start: 2021-10-22 | End: 2021-10-22

## 2021-10-22 RX ORDER — LISINOPRIL 20 MG/1
40 TABLET ORAL DAILY
Status: DISCONTINUED | OUTPATIENT
Start: 2021-10-22 | End: 2021-10-25 | Stop reason: HOSPADM

## 2021-10-22 RX ADMIN — LISINOPRIL 40 MG: 20 TABLET ORAL at 08:58

## 2021-10-22 RX ADMIN — ASPIRIN 81 MG: 81 TABLET, COATED ORAL at 08:58

## 2021-10-22 RX ADMIN — CEFEPIME HYDROCHLORIDE 2 G: 2 INJECTION, POWDER, FOR SOLUTION INTRAVENOUS at 00:54

## 2021-10-22 RX ADMIN — CEFEPIME HYDROCHLORIDE 2 G: 2 INJECTION, POWDER, FOR SOLUTION INTRAVENOUS at 08:58

## 2021-10-22 RX ADMIN — PIPERACILLIN AND TAZOBACTAM 3.38 G: 3; .375 INJECTION, POWDER, LYOPHILIZED, FOR SOLUTION INTRAVENOUS at 11:40

## 2021-10-22 RX ADMIN — FAMOTIDINE 20 MG: 10 INJECTION, SOLUTION INTRAVENOUS at 22:29

## 2021-10-22 RX ADMIN — FAMOTIDINE 20 MG: 10 INJECTION, SOLUTION INTRAVENOUS at 08:58

## 2021-10-22 RX ADMIN — DEXTROSE AND SODIUM CHLORIDE 75 ML/HR: 5; 450 INJECTION, SOLUTION INTRAVENOUS at 17:39

## 2021-10-22 RX ADMIN — HYDROCHLOROTHIAZIDE 12.5 MG: 25 TABLET ORAL at 08:58

## 2021-10-22 RX ADMIN — TICAGRELOR 30 MG: 60 TABLET ORAL at 18:07

## 2021-10-22 RX ADMIN — PIPERACILLIN AND TAZOBACTAM 3.38 G: 3; .375 INJECTION, POWDER, LYOPHILIZED, FOR SOLUTION INTRAVENOUS at 18:08

## 2021-10-22 RX ADMIN — POTASSIUM CHLORIDE 10 MEQ: 14.9 INJECTION, SOLUTION INTRAVENOUS at 09:02

## 2021-10-22 RX ADMIN — ATORVASTATIN CALCIUM 40 MG: 40 TABLET, FILM COATED ORAL at 08:58

## 2021-10-22 RX ADMIN — METRONIDAZOLE 500 MG: 500 INJECTION, SOLUTION INTRAVENOUS at 10:16

## 2021-10-22 RX ADMIN — TICAGRELOR 30 MG: 60 TABLET ORAL at 05:54

## 2021-10-22 NOTE — PROGRESS NOTES
Problem: Self Care Deficits Care Plan (Adult)  Goal: *Acute Goals and Plan of Care (Insert Text)  Description: FUNCTIONAL STATUS PRIOR TO ADMISSION: Patient is globally aphasic, unable to provide PLOF, limited information in chart with indication of a spouse. HOME SUPPORT: Unclear per chart review    Occupational Therapy Goals  Initiated 10/16/2021   1. Patient will perform self-feeding with maximal assistance within 7 day(s). 2.  Patient will perform grooming with moderate assistance within 7 day(s). 3.  Patient will perform lower body dressing with maximal assistance within 7 day(s). 4.  Patient will perform toilet transfers to/from [facility] with maximal assistance x2 within 7 day(s). 5.  Patient will perform all aspects of toileting with maximal assistance within 7 day(s). 6.  Patient will participate in upper extremity therapeutic exercise/activities with supervision/set-up within 7 day(s). 7.  Patient will utilize energy conservation techniques during functional activities with verbal and visual cues within 7 day(s). 8.  Patient will complete the Fugl Lafleur UE Assessment within 7 days. Outcome: Progressing Towards Goal     OCCUPATIONAL THERAPY TREATMENT  Patient: Jass Brown (87 y.o. female)  Date: 10/22/2021  Diagnosis: CVA (cerebral vascular accident) [facility]) [I63.9] CVA (cerebral vascular accident) [facility])       Precautions: Fall, Skin  Chart, occupational therapy assessment, plan of care, and goals were reviewed. ASSESSMENT  Patient continues with skilled OT services and is progressing towards goals but remains limited by decreased command following (improving), R hemiparesis (RLE improving, RUE still flaccid except slight shoulder AROM), R neglect, impaired sitting balance, diffuse pain with PROM/ AAROM (especially L shoulder), and aphasia + apraxia per SLP. Patient progressed supine<>sit to maximum assistance x2 and maintained sitting balance with CGA/ occasional minimum assistance. Attempted to engage patient in lunch but she was not interested in any food items, only drinking tea. She was able to grasp cup with unaffected LUE but unable to raise to mouth, grimacing with L shoulder ROM, ultimately requiring total assistance to drink. Patient attended to R visual stimuli with minimal cuing but required max cuing to visually attend to RUE. Patient remains significantly below functional baseline and would benefit from SNF at d/c. Current Level of Function Impacting Discharge (ADLs): maximum assistance x2 supine<>sit, total assistance ADLs         PLAN :  Patient continues to benefit from skilled intervention to address the above impairments. Continue treatment per established plan of care to address goals. Recommendation for discharge: (in order for the patient to meet his/her long term goals)  Therapy up to 5 days/week in SNF setting    This discharge recommendation:  Has been made in collaboration with the attending provider and/or case management       SUBJECTIVE:   Patient stated Shoot.     OBJECTIVE DATA SUMMARY:   Cognitive/Behavioral Status:  Neurologic State: Alert  Orientation Level: Oriented to person  Cognition: Decreased command following  Perception: Cues to attend to right side of body;Cues to attend right visual field  Perseveration: Perseverates during conversation  Safety/Judgement: Decreased awareness of environment    Functional Mobility and Transfers for ADLs:  Bed Mobility:  Supine to Sit: Maximum assistance;Assist x2  Sit to Supine: Maximum assistance;Assist x2        Balance:  Sitting: Impaired  Sitting - Static: Good (unsupported)  Sitting - Dynamic: Fair (occasional)    ADL Intervention:  Feeding  Drink to Mouth:  Total assistance (dependent)            Cognitive Retraining  Safety/Judgement: Decreased awareness of environment    Pain:  Patient did not verbally indicate pain but grimaced/ moaned with most AROM/ PROM    Activity Tolerance:   VSS, fair    After treatment patient left in no apparent distress:   Supine in bed and Call bell within reach    COMMUNICATION/COLLABORATION:   The patients plan of care was discussed with: Physical therapist and Registered nurse. Patient was educated regarding Her deficit(s) of aphasia and R hemiparesis as this relates to Her diagnosis of CVA. She demonstrated Guarded understanding as evidenced by limited response. Patient and/or family was verbally educated on the BE FAST acronym for signs/symptoms of CVA and TIA. BE FAST was written on patient's communication board  for visual education and reinforcement. All questions answered with patient indicating guarded understanding.      Syed Marroquin OT  Time Calculation: 33 mins

## 2021-10-22 NOTE — PROGRESS NOTES
Spouse contaced by Covid Vaccine RN to confirm administration of covid vaccine. Patient now able to understand & communicate. Answered questions appropriately. Patient did not want the vaccine and spouse also declined the vaccine at this time. Patients RN, Evangelina Us, notified and in agreement. Mary Rosen RN witnessed events.   Edna Pang RN Covid Vaccine Team

## 2021-10-22 NOTE — PROGRESS NOTES
Problem: Patient Education: Go to Patient Education Activity  Goal: Patient/Family Education  Outcome: Progressing Towards Goal     Problem: TIA/CVA Stroke: Day 2 Until Discharge  Goal: Off Pathway (Use only if patient is Off Pathway)  Outcome: Progressing Towards Goal  Goal: Activity/Safety  Outcome: Progressing Towards Goal  Goal: Diagnostic Test/Procedures  Outcome: Progressing Towards Goal  Goal: Nutrition/Diet  Outcome: Progressing Towards Goal  Goal: Discharge Planning  Outcome: Progressing Towards Goal  Goal: Medications  Outcome: Progressing Towards Goal  Goal: Respiratory  Outcome: Progressing Towards Goal  Goal: Treatments/Interventions/Procedures  Outcome: Progressing Towards Goal  Goal: Psychosocial  Outcome: Progressing Towards Goal  Goal: *Verbalizes anxiety and depression are reduced or absent  Outcome: Progressing Towards Goal  Goal: *Absence of aspiration  Outcome: Progressing Towards Goal  Goal: *Absence of deep venous thrombosis signs and symptoms(Stroke Metric)  Outcome: Progressing Towards Goal  Goal: *Optimal pain control at patient's stated goal  Outcome: Progressing Towards Goal  Goal: *Tolerating diet  Outcome: Progressing Towards Goal  Goal: *Ability to perform ADLs and demonstrates progressive mobility and function  Outcome: Progressing Towards Goal  Goal: *Stroke education continued(Stroke Metric)  Outcome: Progressing Towards Goal     Problem: Ischemic Stroke: Discharge Outcomes  Goal: *Verbalizes anxiety and depression are reduced or absent  Outcome: Progressing Towards Goal  Goal: *Verbalize understanding of risk factor modification(Stroke Metric)  Outcome: Progressing Towards Goal  Goal: *Hemodynamically stable  Outcome: Progressing Towards Goal  Goal: *Absence of aspiration pneumonia  Outcome: Progressing Towards Goal  Goal: *Aware of needed dietary changes  Outcome: Progressing Towards Goal  Goal: *Verbalize understanding of prescribed medications including anti-coagulants, anti-lipid, and/or anti-platelets(Stroke Metric)  Outcome: Progressing Towards Goal  Goal: *Tolerating diet  Outcome: Progressing Towards Goal  Goal: *Aware of follow-up diagnostics related to anticoagulants  Outcome: Progressing Towards Goal  Goal: *Ability to perform ADLs and demonstrates progressive mobility and function  Outcome: Progressing Towards Goal  Goal: *Absence of DVT(Stroke Metric)  Outcome: Progressing Towards Goal  Goal: *Absence of aspiration  Outcome: Progressing Towards Goal  Goal: *Optimal pain control at patient's stated goal  Outcome: Progressing Towards Goal  Goal: *Home safety concerns addressed  Outcome: Progressing Towards Goal  Goal: *Describes available resources and support systems  Outcome: Progressing Towards Goal  Goal: *Verbalizes understanding of activation of EMS(911) for stroke symptoms(Stroke Metric)  Outcome: Progressing Towards Goal  Goal: *Understands and describes signs and symptoms to report to providers(Stroke Metric)  Outcome: Progressing Towards Goal  Goal: *Neurolgocially stable (absence of additional neurological deficits)  Outcome: Progressing Towards Goal  Goal: *Verbalizes importance of follow-up with primary care physician(Stroke Metric)  Outcome: Progressing Towards Goal  Goal: *Smoking cessation discussed,if applicable(Stroke Metric)  Outcome: Progressing Towards Goal  Goal: *Depression screening completed(Stroke Metric)  Outcome: Progressing Towards Goal     Problem: Falls - Risk of  Goal: *Absence of Falls  Description: Document Rose Mary Fall Risk and appropriate interventions in the flowsheet.   Outcome: Progressing Towards Goal  Note: Fall Risk Interventions:       Mentation Interventions: Adequate sleep, hydration, pain control, Evaluate medications/consider consulting pharmacy, Increase mobility    Medication Interventions: Evaluate medications/consider consulting pharmacy    Elimination Interventions: Call light in reach, Toileting schedule/hourly rounds Problem: Patient Education: Go to Patient Education Activity  Goal: Patient/Family Education  Outcome: Progressing Towards Goal     Problem: Patient Education: Go to Patient Education Activity  Goal: Patient/Family Education  Outcome: Progressing Towards Goal     Problem: Patient Education: Go to Patient Education Activity  Goal: Patient/Family Education  Outcome: Progressing Towards Goal     Problem: Patient Education: Go to Patient Education Activity  Goal: Patient/Family Education  Outcome: Progressing Towards Goal     Problem: Patient Education: Go to Patient Education Activity  Goal: Patient/Family Education  Outcome: Progressing Towards Goal     Problem: Discharge Planning  Goal: *Discharge to safe environment  Outcome: Progressing Towards Goal

## 2021-10-22 NOTE — PROGRESS NOTES
Transition of Care Plan  · RUR- Low 13%  · DISPOSITION: SNF - [facility] - tomorrow 10/23  · F/U with PCP/Specialist    · Transport: [facility] scheduled for 12pm 10/23    1600: Telephone call to Cordellbernard at [phone] to see if she can accept patient tomorrow, 10/23/2021. Left message for her to return call. Will need to confirm with Ginger in Admissions whether patient is able to DC tomorrow. Gary Gagnon RN, CRM    4:35pm: CM attempt x2 to reach Cordellbernard in Admissions with [facility]. CM provided on call CM # for weekend admission and informed Dr. Lluvia Hare about DC status. Lisa Crews M.S.OTONIEL.

## 2021-10-22 NOTE — ROUTINE PROCESS
RN spoke to Dr. Sebastian Moon about pt receiving COVID vaccine. Dr. Sebastian Moon stated, \"I can't approve or disapprove if patient is consenting than its ok\". Family consent has been given by . Vaccine will be administered today. Vaccine team spoke with patient and she refused vaccine with  on phone so vaccine was not given.

## 2021-10-22 NOTE — PROGRESS NOTES
[facility]  Hospitalist Group                                                                                          Hospitalist Progress Note  Clemente Sanderson MD  Answering service: [phone] from in house phone        Date of Service:  10/22/2021  NAME:  Pollo Hernández  :    MRN:  [id number]      Admission Summary:   Per medical record, Pollo Hernández is a 66 y.o. female who presents with aphasia    Patient presented to [facility] with aphasia.   No history can be obtained from the patient, apparently patient went to bed in normal state, woke up today morning with complete aphasia and right-sided weakness, she was unable to speak or stand, came to the ER, was found to have acute CVA and was requested to be admitted to the hospitalist service. No further history could be obtained from the patient or chart review    Interval history / Subjective:   Patient seen this morning on rounds. Sleeping in bed, arousable, aphasic, had a rapid response called in CT yesterday, appeared baseline to me on examination, CT of the head repeated with no acute findings     Assessment & Plan:     Acute Left MCA ischemic stroke with right U/L weakness and aphasia -- not thrombectomy candidate as stroke completed at time of eval  CTA:  possible occlusion of the anterior division of  the left anterior cerebral artery, with a relative paucity of vessels in the  left frontal lobe anterior middle cerebral artery territory  MRI:  Moderate acute infarction of the anterior inferior left frontal lobe,  predominantly cortically based without significant superimposed hemorrhage, mass  Effect. Mild chronic microvascular ischemic change and mild cerebral atrophy.   Continuing atorvastatin  - Cards:  EKG NSR, echo not done, on tele  Continue aspirin  Plavix switched to Brilinta per neurology, P2 Y 12 levels pending  Neurology following, thank you recommendations  Physical therapy recommending SNF versus IPR  Working with OT  SLP working with patient, as been cleared for oral intake  Had a rapid response called in CT yesterday, no new findings on CT head done post rapid response\    Pneumonia  In setting of aphasia, concern for aspiration  COVID-19 PCR was negative  Continuing metronidazole  Continuing cefepime and vancomycin, appreciate ID input  Afebrile  Aspiration precaution      Acute cystitis with hematuria  This was not present on admission  Urinalysis yesterday indicative of urinary tract infection  Continuing cefepime  Patient remains well, we will add vancomycin  Urine culture with multiple organisms, ID has contacted lab for type II predominant organisms      Sepsis  Present on admission  Did have leukocytosis and persistent fevers  Leukocytosis improving on the 18th with 16.4, down to 13.0 today  Dynamically stable  Unclear if due to aspiration pneumonia versus cystitis, treating both    Accelerated hypertension  Blood pressure currently elevated  Continuing hydrochlorothiazide  I we will increase lisinopril to 40 mg daily  We will continue to monitor      HLD    Continuing atorvastatin    Hypokalemia  Resolving      Spoke with   Dayan Domingo, apprised him of patient's condition, all his questions were answered    Code status: Full    DVT prophylaxis: SCDs    Care Plan discussed with: Patient/Family     Anticipated Disposition: [facility]/Rehab     Anticipated Discharge: 24 hours to 48 hours       Patient had a rapid response called in the CT due to aphasia, patient with baseline dysarthria, and expressive aphasia, I went and evaluated the patient, appears to be baseline, rapid response canceled, will repeat a CT of the head     Hospital Problems  Never Reviewed        Codes Class Noted POA    * (Principal) CVA (cerebral vascular accident) [facility]) ICD-10-CM: I63.9  ICD-9-CM: 434.91  10/15/2021 Unknown                Review of Systems:   Pertinent items are noted in HPI. Vital Signs:    Last 24hrs VS reviewed since prior progress note. Most recent are:  Visit Vitals  BP (!) 161/58 (BP 1 Location: Right arm, BP Patient Position: At rest)   Pulse 71   Temp 97.3 °F (36.3 °C)   Resp 18   Ht 5' 7\" (1.702 m)   Wt 122.6 kg (270 lb 4.5 oz)   SpO2 95%   BMI 42.33 kg/m²         Intake/Output Summary (Last 24 hours) at 10/22/2021 0751  Last data filed at 10/21/2021 1600  Gross per 24 hour   Intake    Output 1600 ml   Net -1600 ml        Physical Examination:             Constitutional:  No acute distress, averbal       Resp:  CTA bilaterally. No wheezing/rhonchi/rales. No accessory muscle use   CV:  Regular rhythm, normal rate, no murmurs, gallops, rubs    GI:  Soft, non distended, non tender. Protuberant normoactive bowel sounds, no hepatosplenomegaly     Musculoskeletal:  No edema, warm, 2+ pulses throughout    Neurologic:  Right-sided hemiplegia, right facial weakness, aphasia            Data Review:    Review and/or order of clinical lab test      Labs:     Recent Labs     10/22/21  0340 10/21/21  0224   WBC 13.1* 12.8*   HGB 12.3 12.6   HCT 37.6 38.0    347     Recent Labs     10/22/21  0340 10/21/21  0224 10/20/21  0336    136 135*   K 3.4* 3.6 3.4*    101 100   CO2 29 30 30   BUN 17 18 18   CREA 0.44* 0.42* 0.48*   * 142* 146*   CA 8.7 8.4* 8.5     Recent Labs     10/22/21  0340 10/21/21  0224 10/20/21  0336   ALT 35 48 34   AP 94 99 85   TBILI 0.6 0.8 0.6   TP 6.2* 6.4 6.2*   ALB 2.4* 2.4* 2.4*   GLOB 3.8 4.0 3.8     No results for input(s): INR, PTP, APTT, INREXT, INREXT in the last 72 hours. No results for input(s): FE, TIBC, PSAT, FERR in the last 72 hours. No results found for: FOL, RBCF   No results for input(s): PH, PCO2, PO2 in the last 72 hours. No results for input(s): CPK, CKNDX, TROIQ in the last 72 hours.     No lab exists for component: CPKMB  Lab Results   Component Value Date/Time    Cholesterol, total 162 10/16/2021 12:25 AM HDL Cholesterol 39 10/16/2021 12:25 AM    LDL, calculated 102.6 (H) 10/16/2021 12:25 AM    Triglyceride 102 10/16/2021 12:25 AM    CHOL/HDL Ratio 4.2 10/16/2021 12:25 AM     Lab Results   Component Value Date/Time    Glucose (POC) 139 (H) 10/21/2021 01:21 PM    Glucose (POC) 144 (H) 10/21/2021 09:15 AM    Glucose (POC) 144 (H) 10/19/2021 07:02 AM    Glucose (POC) 157 (H) 10/19/2021 05:14 AM    Glucose (POC) 142 (H) 10/18/2021 10:37 PM     Lab Results   Component Value Date/Time    Color YELLOW/STRAW 10/18/2021 07:01 PM    Appearance CLOUDY (A) 10/18/2021 07:01 PM    Specific gravity 1.021 10/18/2021 07:01 PM    pH (UA) 5.5 10/18/2021 07:01 PM    Protein TRACE (A) 10/18/2021 07:01 PM    Glucose 100 (A) 10/18/2021 07:01 PM    Ketone Negative 10/18/2021 07:01 PM    Bilirubin Negative 10/18/2021 07:01 PM    Urobilinogen 0.2 10/18/2021 07:01 PM    Nitrites Positive (A) 10/18/2021 07:01 PM    Leukocyte Esterase MODERATE (A) 10/18/2021 07:01 PM    Epithelial cells MODERATE (A) 10/18/2021 07:01 PM    Bacteria 2+ (A) 10/18/2021 07:01 PM    WBC 20-50 10/18/2021 07:01 PM    RBC 5-10 10/18/2021 07:01 PM         Medications Reviewed:     Current Facility-Administered Medications   Medication Dose Route Frequency    potassium chloride 10 mEq in 50 ml IVPB  10 mEq IntraVENous ONCE    lisinopriL (PRINIVIL, ZESTRIL) tablet 20 mg  20 mg Oral DAILY    COVID-19 vac,Ad26-PF (Blend Labs) injection 1 Dose  1 Dose IntraMUSCular PRIOR TO DISCHARGE    Vancomycin - pharmacy to dose   Other Rx Dosing/Monitoring    vancomycin (VANCOCIN) 1250 mg in  ml infusion  1,250 mg IntraVENous Q12H    ticagrelor (BRILINTA) tablet 30 mg  30 mg Oral Q12H    cefepime (MAXIPIME) 2 g in sterile water (preservative free) 10 mL IV syringe  2 g IntraVENous Q8H    metroNIDAZOLE (FLAGYL) IVPB premix 500 mg  500 mg IntraVENous Q12H    hydroCHLOROthiazide (HYDRODIURIL) tablet 12.5 mg  12.5 mg Oral DAILY    dextrose 5 % - 0.45% NaCl infusion  75 mL/hr IntraVENous CONTINUOUS    atorvastatin (LIPITOR) tablet 40 mg  40 mg Oral DAILY    aspirin delayed-release tablet 81 mg  81 mg Oral DAILY    acetaminophen (TYLENOL) tablet 650 mg  650 mg Oral Q4H PRN    Or    acetaminophen (TYLENOL) solution 650 mg  650 mg Per NG tube Q4H PRN    Or    acetaminophen (TYLENOL) suppository 650 mg  650 mg Rectal Q4H PRN    famotidine (PF) (PEPCID) 20 mg in 0.9% sodium chloride 10 mL injection  20 mg IntraVENous Q12H     ______________________________________________________________________  EXPECTED LENGTH OF STAY: 4d 9h  ACTUAL LENGTH OF STAY:          7                 Erika Goodman MD

## 2021-10-22 NOTE — PROGRESS NOTES
ID Progress Note  10/22/2021    Subjective:     Aphasia  Afebrile     Review of Systems:            Symptom Y/N Comments   Symptom Y/N Comments   Fever/Chills       Chest Pain        Poor Appetite       Edema        Cough       Abdominal Pain        Sputum       Joint Pain        SOB/MASON       Pruritis/Rash        Nausea/vomit       Tolerating PT/OT        Diarrhea       Tolerating Diet        Constipation       Other           Could NOT obtain due to: aphasia     Objective:     Vitals:   Visit Vitals  BP (!) 161/58 (BP 1 Location: Right arm, BP Patient Position: At rest)   Pulse 71   Temp 97.3 °F (36.3 °C)   Resp 18   Ht 5' 7\" (1.702 m)   Wt 122.6 kg (270 lb 4.5 oz)   SpO2 95%   BMI 42.33 kg/m²        Tmax:  Temp (24hrs), Av.6 °F (36.4 °C), Min:97 °F (36.1 °C), Max:98.3 °F (36.8 °C)      PHYSICAL EXAM:  General: Chronically ill appearing, WD, WN. Alert, no acute distress    EENT:  EOMI. Anicteric sclerae. Dry mucous membrane  Resp:  Clear in apex with decrease of breath sounds at bases, No accessory muscle use  CV:  Regular  rhythm,  trace of pitting edema  GI:  Soft, Non distended, Non tender. +Bowel sounds  Neurologic:  Alert, Pupils are reactive to light, aphasia, Alert and oriented X self only, right side flaccid  Psych:   Unable to assess insight. Not anxious nor agitated  Skin:  No rashes.   No jaundice    Labs:   Lab Results   Component Value Date/Time    WBC 13.1 (H) 10/22/2021 03:40 AM    HGB 12.3 10/22/2021 03:40 AM    HCT 37.6 10/22/2021 03:40 AM    PLATELET 405  03:40 AM    MCV 95.7 10/22/2021 03:40 AM     Lab Results   Component Value Date/Time    Sodium 138 10/22/2021 03:40 AM    Potassium 3.4 (L) 10/22/2021 03:40 AM    Chloride 100 10/22/2021 03:40 AM    CO2 29 10/22/2021 03:40 AM    Anion gap 9 10/22/2021 03:40 AM    Glucose 134 (H) 10/22/2021 03:40 AM    BUN 17 10/22/2021 03:40 AM    Creatinine 0.44 (L) 10/22/2021 03:40 AM    BUN/Creatinine ratio 39 (H) 10/22/2021 03:40 AM    GFR est AA >60 10/22/2021 03:40 AM    GFR est non-AA >60 10/22/2021 03:40 AM    Calcium 8.7 10/22/2021 03:40 AM    Bilirubin, total 0.6 10/22/2021 03:40 AM    Alk. phosphatase 94 10/22/2021 03:40 AM    Protein, total 6.2 (L) 10/22/2021 03:40 AM    Albumin 2.4 (L) 10/22/2021 03:40 AM    Globulin 3.8 10/22/2021 03:40 AM    A-G Ratio 0.6 (L) 10/22/2021 03:40 AM    ALT (SGPT) 35 10/22/2021 03:40 AM       Assessment and Plan   Asp PNA  Fever (resolved)  Enterococcus faecalis and E-coli UTI  - afebrile overnight, WBC 13    Blood cx (10/18) no growth so far    Urine cx (10/18) enterococcus faecalis and E-coli    CXR (10/18) Mild interstitial/airspace opacities and small left pleural effusion similar to prior study. US ABD (-)     CTA of chest (10/21) There is no pulmonary embolism. There is no aortic aneurysm or dissection. Minimal coronary artery disease and minimal dependent atelectasis. Imaging findings consistent with prior granulomatous infection. Right hip x-ray (-)    D-dimer 2.7, CRP 14.8;    RF (-), ROBERT (-)       IV cefepime, flagyl, and vancomycin changed to IV zosyn, recommend to complete total 10 days, last dose 11/4/2021, may complete ABX therapy with PO Augmentin upon discharge. Above plan of care discussed and agreed with Dr. Gwendel Angelucci will see prn this weekend, call if issues arise.       Ivy Victor, NP

## 2021-10-22 NOTE — PROGRESS NOTES
Problem: Dysphagia (Adult)  Goal: *Acute Goals and Plan of Care (Insert Text)  Description: Speech Pathology Goals    Initiated 10/18/2021  1. Patient will tolerate ice chips with no adverse effects within 7 days. 2. Patient will participate in swallow reassessment within 7 days. Outcome: Progressing Towards Goal     Problem: Communication Impaired (Adult)  Goal: *Acute Goals and Plan of Care (Insert Text)  Description: Speech Pathology Goals    Initiated 10/18/2021  1. Patient will respond to simple Yes/No questions with 80% accuracy within 7 days  2. Patient will follow simple one step commands with 80% accuracy within 7 days. 3. Patient will name simple objects of ADL with 80% accuracy given mod cues within 7 days. Outcome: Progressing Towards Goal     SPEECH LANGUAGE PATHOLOGY DYSPHAGIA AND SPEECH TREATMENT  Patient: Angel Barillas (1 y.o. female)  Date: 10/22/2021  Diagnosis: CVA (cerebral vascular accident) [facility]) [I63.9] CVA (cerebral vascular accident) [facility])       Precautions: Fall, Skin    ASSESSMENT:  Patient has been tolerating puree diet/thin liquids with no difficulties or adverse effects. Patient with no s/s of aspiration with any trials bedside. Pt continues with prolonged posterior propulsion with purees, and benefits from alternating solids/liquids and small bites and sips. Recommend continue with puree diet/thin liquids. SLP to continue to follow for diet tolerance and upgrade as appropriate. Patient also is showing slow progression with receptive/expressive language. Patient with improved command following and responses to simple Yes/No questions on this date. Pt benefited from repetition and models. Pt with stronger automatic than volitional speech and able to appropriately approximate counting to five today provided cues. Pt with more attempts at verbalizations today however expressive language remains characterized by unintelligible neologisms and paraphasias.  Pt also presents with apraxia of speech, further impacting functional communication. Pt continues to benefit from SLP intervention for speech and language, at this and next levels of care. PLAN:  Recommendations and Planned Interventions:  -- puree diet/thin liquids  -- 1:1 assistance during mealtimes   -- alternate bites and sips  -- small bites and sips  -- SLP to follow for language treatment and swallowing    Recommend utilizing the following strategies to facilitate patient's functional communication  -- Utilize simple Yes/No questions to obtain information  -- Provide patient with visual cues   -- Allow patient extra time to process and respond  -- Provide models and repetition for command following    Patient continues to benefit from skilled intervention to address the above impairments. Continue treatment per established plan of care. Discharge Recommendations:  Skilled Nursing Facility     SUBJECTIVE:   Patient stated snf for cat, \"ssop\" for dog and \"ush\" for up in repetition tasks. OBJECTIVE:   Cognitive and Communication Status:  Neurologic State: Alert  Orientation Level: Oriented to person  Cognition: Follows commands  Perception: Cues to attend right visual field, Cues to attend to right side of body  Perseveration: Perseverates during conversation  Safety/Judgement: Decreased awareness of environment    Dysphagia Treatment and Interventions:  Oral Assessment:  Oral Assessment  Labial: Decreased rate;Decreased seal  Dentition: Intact; Natural  Oral Hygiene: moist oral mucosa free of secretions  Lingual: Decreased rate;Decreased strength  Velum: Unable to visualize  Mandible: No impairment  P.O. Trials:  Patient Position: semi-upright in bed  Vocal quality prior to P.O.: Hoarse  Consistency Presented:  Thin liquid;Puree  How Presented: SLP-fed/presented;Straw;Spoon     Bolus Acceptance: No impairment  Bolus Formation/Control: Impaired  Type of Impairment: Delayed  Propulsion: Delayed (# of seconds)  Oral Residue: None  Initiation of Swallow: No impairment  Laryngeal Elevation: Functional  Aspiration Signs/Symptoms: None  Pharyngeal Phase Characteristics: No impairment, issues, or problems   Effective Modifications: Small sips and bites; Alternate liquids/solids  Cues for Modifications: Minimal       Oral Phase Severity: Moderate  Pharyngeal Phase Severity : No impairment                                                             Speech Treatment and Interventions: Motor Speech:                       Speech Characteristics: Other (comment); Paraphasias; Neologisms (non-fluent, apraxic)  Apraxic Characteristics: Marked difficulty initiating speech; Visible/audible searching; Phonemic errors;Numerous attempts at the word; Inconsistent errors; Fewer errors in automatic speech than volitional speech; Awareness of errors  Dysarthric Characteristics: Imprecise  Interfering Components: Other (comment) (motor planning)  Language Comprehension and Expression:  Auditory Comprehension   Auditory Impairment: Yes  Response to Basic Yes/No Questions (%):  (75)  One-Step Basic Commands (%):  (75)  Interfering Components: Attention - sustained; Motor planning; Working memory;Processing speed  Effective Techniques: Extra processing time;Remove environmental distractions;Repetition;Visual/Gestural cues  Verbal Expression  Verbal Expression  Primary Mode of Expression: Verbal  Initiation: Impaired (%)  Automatic Speech Task: Impaired (comment)  Automatic speech task cueing type: Repetition;Imitation  Automatic speech task cueing amount: Min-mod  Repetition: Impaired  Word Repetition (%):  (25)  Repetition cueing type: Repetition;Imitation  Repetition cueing amount: Maximum  Naming: Impaired  Confrontation (%):  (0)  Conversation: Non-fluent; Apraxic  Speech Characteristics: Other (comment); Paraphasias; Neologisms (non-fluent, apraxic)  Interfering Components: Other (comment) (motor planning)  Effective Techniques: Pacing;Provide extra time;Cueing      After treatment:   Patient left in no apparent distress in bed, Call bell within reach, and Nursing notified    COMMUNICATION/EDUCATION:   Patient was educated regarding her deficit(s) of aphasia/apraxia as this relates to her diagnosis of CVA. She demonstrated Guarded understanding as evidenced by receptive language deficits. The patient's plan of care including recommendations, planned interventions, and recommended diet changes were discussed with: Registered nurse.      Wali Peralta M.S. CF-SLP   Speech Language Pathologist     Time Calculation: 13 mins

## 2021-10-22 NOTE — PROGRESS NOTES
Pharmacist Note - Vancomycin Dosing  Therapy day 3  Indication: Aspiration PNA, UTI  Current regimen: 1250 mg every 12 hours    Recent Labs     10/22/21  0340 10/21/21  0224 10/20/21  0336   WBC 13.1* 12.8* 13.0*   CREA 0.44* 0.42* 0.48*   BUN 17 18 18     Recent Vancomycin Levels (72hrs):  Recent Labs     10/22/21  0340   VANCR 15.7     A random vancomycin level of 15.7 mcg/mL was obtained and from this level, the patient's AUC24 is calculated to be 329 with the current regimen. Goal target range AUC/DAMIAN 400-600      Plan: Change to 1500 mg every 12 hours. Pharmacy will continue to monitor this patient daily for changes in clinical status and renal function. *Random vancomycin levels are used to calculate AUC/DAMIAN, this level should not be interpreted as a trough. Vancomycin has been dosed using Bayesian kinetics software to target an AUC24:DAMIAN of 400-600, which provides adequate exposure for as assumed infection due to MRSA with an DAMIAN of 1 or less while reducing the risk of nephrotoxicity as seen with traditional trough based dosing goals.

## 2021-10-22 NOTE — PROGRESS NOTES
Problem: Mobility Impaired (Adult and Pediatric)  Goal: *Acute Goals and Plan of Care (Insert Text)  Description: Note: FUNCTIONAL STATUS PRIOR TO ADMISSION:  Per documentation, it is inferred patient was independent PLOF however patient is aphasic and unable to provide medical history     HOME SUPPORT PRIOR TO ADMISSION: The patient lived with . Physical Therapy Goals  Initiated 10/16/2021  1. Patient will move from supine to sit and sit to supine , scoot up and down, and roll side to side in bed with moderate assistance  within 7 day(s). 2.  Patient will transfer from bed to chair and chair to bed with maximal assistance using the least restrictive device within 7 day(s). 3.  Patient will perform sit to stand with maximal assistance within 7 day(s). Outcome: Progressing Towards Goal   PHYSICAL THERAPY TREATMENT  Patient: Michelle Oneal (15 y.o. female)  Date: 10/22/2021  Diagnosis: CVA (cerebral vascular accident) [facility]) [I63.9] CVA (cerebral vascular accident) [facility])       Precautions: Fall, Skin  Chart, physical therapy assessment, plan of care and goals were reviewed. ASSESSMENT  Patient continues with skilled PT services and is progressing towards goals. Pt received supine in bed. Pt tolerated session well, but continues to be limited by R sided weakness (RUE>RLE), decreased functional mobility, decreased tolerance to activity, impaired seated balance, inability to stand and ambulate. Pt required max A x 2 supine to still able to initiate mobility of bilateral LEs towards EOB. Pt tolerated seated EOB with standby assist x ~10 minutes. Pt able to perform LAQ while seated EOB with max verbal cueing and demo. Pt attempting to verbalize more during session. Pt will continue to benefit from PT to progress mobility status as tolerated and will benefit from SNF placement upon discharge to continue therapy efforts. .     Current Level of Function Impacting Discharge (mobility/balance): max A x 2 supine<>sit EOB, standby assist seated EOB balance    Other factors to consider for discharge: previously independent         PLAN :  Patient continues to benefit from skilled intervention to address the above impairments. Continue treatment per established plan of care. to address goals. Recommendation for discharge: (in order for the patient to meet his/her long term goals)  Therapy up to 5 days/week in SNF setting    This discharge recommendation:  Has been made in collaboration with the attending provider and/or case management    IF patient discharges home will need the following DME: to be determined (TBD)       SUBJECTIVE:   Patient stated Maybe. Shoot  pt repeating the word maybe, and shoot during session in frustration    OBJECTIVE DATA SUMMARY:   Critical Behavior:  Neurologic State: Alert  Orientation Level: Oriented to person  Cognition: Decreased command following  Safety/Judgement: Decreased awareness of environment  Functional Mobility Training:  Bed Mobility:     Supine to Sit: Maximum assistance;Assist x2  Sit to Supine: Maximum assistance;Assist x2          Balance:  Sitting: Impaired  Sitting - Static: Good (unsupported)  Sitting - Dynamic: Fair (occasional)      Therapeutic Exercises:   LAQ, ankle pumps, AAROM knee flexion bilaterally  Pain Rating:  Pt grimacing in pain with mobility especially RUE    Activity Tolerance:   Fair    After treatment patient left in no apparent distress:   Supine in bed, Call bell within reach, Bed / chair alarm activated, and Side rails x 3    COMMUNICATION/COLLABORATION:   The patients plan of care was discussed with: Occupational therapist and Registered nurse.      Chikis Esparza, PT, DPT   Time Calculation: 33 mins

## 2021-10-22 NOTE — PROGRESS NOTES
Transition of Care Plan   RUR- Low 13%   DISPOSITION: SNF - [facility] - tomorrow 10/23   F/U with PCP/Specialist     Transport: [facility] scheduled for 12pm [facility]23    Per Dr. Liam Ruiz, will hold DC today to monitor patient's BP. CM updated Admissions at [facility] and changed [facility] to tomorrow at 12pm.     CM scanned completed [facility] and Med list per request to [email or web address]. Upon DC tomorrow, will need to send DC summary and rapid covid test results to Admissions at [facility]. Covid test results must be completed within 24 hours of DC.    2:30pm: CM spoke with patient's spouse to inform him of DC plan for tomorrow. He is in agreement with plan. Constanza Mckinley) BILL Melendez.

## 2021-10-22 NOTE — PROGRESS NOTES
Problem: Patient Education: Go to Patient Education Activity  Goal: Patient/Family Education  Outcome: Progressing Towards Goal     Problem: TIA/CVA Stroke: Day 2 Until Discharge  Goal: Off Pathway (Use only if patient is Off Pathway)  Outcome: Progressing Towards Goal  Goal: Activity/Safety  Outcome: Progressing Towards Goal  Goal: Diagnostic Test/Procedures  Outcome: Progressing Towards Goal  Goal: Nutrition/Diet  Outcome: Progressing Towards Goal  Goal: Discharge Planning  Outcome: Progressing Towards Goal  Goal: Medications  Outcome: Progressing Towards Goal  Goal: Respiratory  Outcome: Progressing Towards Goal  Goal: Treatments/Interventions/Procedures  Outcome: Progressing Towards Goal  Goal: Psychosocial  Outcome: Progressing Towards Goal  Goal: *Verbalizes anxiety and depression are reduced or absent  Outcome: Progressing Towards Goal  Goal: *Absence of aspiration  Outcome: Progressing Towards Goal  Goal: *Absence of deep venous thrombosis signs and symptoms(Stroke Metric)  Outcome: Progressing Towards Goal  Goal: *Optimal pain control at patient's stated goal  Outcome: Progressing Towards Goal  Goal: *Tolerating diet  Outcome: Progressing Towards Goal  Goal: *Ability to perform ADLs and demonstrates progressive mobility and function  Outcome: Progressing Towards Goal  Goal: *Stroke education continued(Stroke Metric)  Outcome: Progressing Towards Goal     Problem: Falls - Risk of  Goal: *Absence of Falls  Description: Document Rose Mary Fall Risk and appropriate interventions in the flowsheet.   Outcome: Progressing Towards Goal  Note: Fall Risk Interventions:       Mentation Interventions: Adequate sleep, hydration, pain control, Increase mobility, Bed/chair exit alarm, Eyeglasses and hearing aids    Medication Interventions: Evaluate medications/consider consulting pharmacy, Patient to call before getting OOB    Elimination Interventions: Bed/chair exit alarm, Call light in reach, Patient to call for help with toileting needs, Toileting schedule/hourly rounds              Problem: Patient Education: Go to Patient Education Activity  Goal: Patient/Family Education  Outcome: Progressing Towards Goal     Problem: Patient Education: Go to Patient Education Activity  Goal: Patient/Family Education  Outcome: Progressing Towards Goal     Problem: Patient Education: Go to Patient Education Activity  Goal: Patient/Family Education  Outcome: Progressing Towards Goal     Problem: Patient Education: Go to Patient Education Activity  Goal: Patient/Family Education  Outcome: Progressing Towards Goal     Problem: Patient Education: Go to Patient Education Activity  Goal: Patient/Family Education  Outcome: Progressing Towards Goal     Problem: Discharge Planning  Goal: *Discharge to safe environment  Outcome: Progressing Towards Goal

## 2021-10-22 NOTE — PROGRESS NOTES
Bedside and Verbal shift change report given to Noemy Florian (oncoming nurse) by Tess Goodman (offgoing nurse). Report included the following information SBAR.

## 2021-10-22 NOTE — ROUTINE PROCESS
Bedside and Verbal shift change report given to Autumn Matthews RN (oncoming nurse) by Maryam Melara RN (offgoing nurse). Report included the following information SBAR, Kardex, MAR, Cardiac Rhythm NSR and Dual Neuro Assessment.

## 2021-10-23 LAB
ALBUMIN SERPL-MCNC: 2.2 G/DL (ref 3.5–5)
ALBUMIN/GLOB SERPL: 0.5 {RATIO} (ref 1.1–2.2)
ALP SERPL-CCNC: 92 U/L (ref 45–117)
ALT SERPL-CCNC: 33 U/L (ref 12–78)
ANION GAP SERPL CALC-SCNC: 5 MMOL/L (ref 5–15)
AST SERPL-CCNC: 20 U/L (ref 15–37)
ATRIAL RATE: 66 BPM
BACTERIA SPEC CULT: NORMAL
BASOPHILS # BLD: 0.1 K/UL (ref 0–0.1)
BASOPHILS NFR BLD: 0 % (ref 0–1)
BILIRUB SERPL-MCNC: 0.6 MG/DL (ref 0.2–1)
BUN SERPL-MCNC: 16 MG/DL (ref 6–20)
BUN/CREAT SERPL: 37 (ref 12–20)
CALCIUM SERPL-MCNC: 8.9 MG/DL (ref 8.5–10.1)
CALCULATED P AXIS, ECG09: 77 DEGREES
CALCULATED R AXIS, ECG10: -26 DEGREES
CALCULATED T AXIS, ECG11: 41 DEGREES
CHLORIDE SERPL-SCNC: 100 MMOL/L (ref 97–108)
CO2 SERPL-SCNC: 27 MMOL/L (ref 21–32)
CREAT SERPL-MCNC: 0.43 MG/DL (ref 0.55–1.02)
DIAGNOSIS, 93000: NORMAL
DIFFERENTIAL METHOD BLD: ABNORMAL
EOSINOPHIL # BLD: 0.1 K/UL (ref 0–0.4)
EOSINOPHIL NFR BLD: 1 % (ref 0–7)
ERYTHROCYTE [DISTWIDTH] IN BLOOD BY AUTOMATED COUNT: 13.1 % (ref 11.5–14.5)
GLOBULIN SER CALC-MCNC: 4.7 G/DL (ref 2–4)
GLUCOSE BLD STRIP.AUTO-MCNC: 130 MG/DL (ref 65–117)
GLUCOSE BLD STRIP.AUTO-MCNC: 132 MG/DL (ref 65–117)
GLUCOSE BLD STRIP.AUTO-MCNC: 141 MG/DL (ref 65–117)
GLUCOSE SERPL-MCNC: 154 MG/DL (ref 65–100)
HCT VFR BLD AUTO: 37.5 % (ref 35–47)
HGB BLD-MCNC: 12.4 G/DL (ref 11.5–16)
IMM GRANULOCYTES # BLD AUTO: 0.1 K/UL (ref 0–0.04)
IMM GRANULOCYTES NFR BLD AUTO: 1 % (ref 0–0.5)
LYMPHOCYTES # BLD: 1.6 K/UL (ref 0.8–3.5)
LYMPHOCYTES NFR BLD: 10 % (ref 12–49)
MCH RBC QN AUTO: 31.2 PG (ref 26–34)
MCHC RBC AUTO-ENTMCNC: 33.1 G/DL (ref 30–36.5)
MCV RBC AUTO: 94.2 FL (ref 80–99)
MONOCYTES # BLD: 1.6 K/UL (ref 0–1)
MONOCYTES NFR BLD: 10 % (ref 5–13)
NEUTS SEG # BLD: 12.1 K/UL (ref 1.8–8)
NEUTS SEG NFR BLD: 78 % (ref 32–75)
NRBC # BLD: 0 K/UL (ref 0–0.01)
NRBC BLD-RTO: 0 PER 100 WBC
P-R INTERVAL, ECG05: 168 MS
PLATELET # BLD AUTO: 393 K/UL (ref 150–400)
PMV BLD AUTO: 9.6 FL (ref 8.9–12.9)
POTASSIUM SERPL-SCNC: 3.3 MMOL/L (ref 3.5–5.1)
PROT SERPL-MCNC: 6.9 G/DL (ref 6.4–8.2)
Q-T INTERVAL, ECG07: 436 MS
QRS DURATION, ECG06: 90 MS
QTC CALCULATION (BEZET), ECG08: 457 MS
RBC # BLD AUTO: 3.98 M/UL (ref 3.8–5.2)
SERVICE CMNT-IMP: ABNORMAL
SERVICE CMNT-IMP: NORMAL
SODIUM SERPL-SCNC: 132 MMOL/L (ref 136–145)
VENTRICULAR RATE, ECG03: 66 BPM
WBC # BLD AUTO: 15.6 K/UL (ref 3.6–11)

## 2021-10-23 PROCEDURE — 74011000258 HC RX REV CODE- 258: Performed by: NURSE PRACTITIONER

## 2021-10-23 PROCEDURE — 74011250637 HC RX REV CODE- 250/637: Performed by: HOSPITALIST

## 2021-10-23 PROCEDURE — 74011250636 HC RX REV CODE- 250/636: Performed by: FAMILY MEDICINE

## 2021-10-23 PROCEDURE — 82962 GLUCOSE BLOOD TEST: CPT

## 2021-10-23 PROCEDURE — 74011000250 HC RX REV CODE- 250: Performed by: FAMILY MEDICINE

## 2021-10-23 PROCEDURE — 74011250636 HC RX REV CODE- 250/636: Performed by: NURSE PRACTITIONER

## 2021-10-23 PROCEDURE — 85025 COMPLETE CBC W/AUTO DIFF WBC: CPT

## 2021-10-23 PROCEDURE — 74011250637 HC RX REV CODE- 250/637: Performed by: FAMILY MEDICINE

## 2021-10-23 PROCEDURE — 65660000000 HC RM CCU STEPDOWN

## 2021-10-23 PROCEDURE — 74011000250 HC RX REV CODE- 250: Performed by: INTERNAL MEDICINE

## 2021-10-23 PROCEDURE — 74011250637 HC RX REV CODE- 250/637: Performed by: INTERNAL MEDICINE

## 2021-10-23 PROCEDURE — 80053 COMPREHEN METABOLIC PANEL: CPT

## 2021-10-23 PROCEDURE — 36415 COLL VENOUS BLD VENIPUNCTURE: CPT

## 2021-10-23 PROCEDURE — 74011250637 HC RX REV CODE- 250/637: Performed by: NURSE PRACTITIONER

## 2021-10-23 RX ORDER — AMLODIPINE BESYLATE 5 MG/1
5 TABLET ORAL DAILY
Status: DISCONTINUED | OUTPATIENT
Start: 2021-10-23 | End: 2021-10-25 | Stop reason: HOSPADM

## 2021-10-23 RX ADMIN — TICAGRELOR 30 MG: 60 TABLET ORAL at 17:20

## 2021-10-23 RX ADMIN — PIPERACILLIN AND TAZOBACTAM 3.38 G: 3; .375 INJECTION, POWDER, LYOPHILIZED, FOR SOLUTION INTRAVENOUS at 19:01

## 2021-10-23 RX ADMIN — POTASSIUM BICARBONATE 40 MEQ: 391 TABLET, EFFERVESCENT ORAL at 09:21

## 2021-10-23 RX ADMIN — PIPERACILLIN AND TAZOBACTAM 3.38 G: 3; .375 INJECTION, POWDER, LYOPHILIZED, FOR SOLUTION INTRAVENOUS at 10:44

## 2021-10-23 RX ADMIN — DEXTROSE AND SODIUM CHLORIDE 75 ML/HR: 5; 450 INJECTION, SOLUTION INTRAVENOUS at 21:08

## 2021-10-23 RX ADMIN — PIPERACILLIN AND TAZOBACTAM 3.38 G: 3; .375 INJECTION, POWDER, LYOPHILIZED, FOR SOLUTION INTRAVENOUS at 03:00

## 2021-10-23 RX ADMIN — ASPIRIN 81 MG: 81 TABLET, COATED ORAL at 09:18

## 2021-10-23 RX ADMIN — TICAGRELOR 30 MG: 60 TABLET ORAL at 06:32

## 2021-10-23 RX ADMIN — FAMOTIDINE 20 MG: 10 INJECTION, SOLUTION INTRAVENOUS at 20:59

## 2021-10-23 RX ADMIN — ATORVASTATIN CALCIUM 40 MG: 40 TABLET, FILM COATED ORAL at 09:54

## 2021-10-23 RX ADMIN — AMLODIPINE BESYLATE 5 MG: 5 TABLET ORAL at 09:18

## 2021-10-23 RX ADMIN — DEXTROSE AND SODIUM CHLORIDE 75 ML/HR: 5; 450 INJECTION, SOLUTION INTRAVENOUS at 09:16

## 2021-10-23 RX ADMIN — FAMOTIDINE 20 MG: 10 INJECTION, SOLUTION INTRAVENOUS at 09:24

## 2021-10-23 RX ADMIN — LISINOPRIL 40 MG: 20 TABLET ORAL at 09:20

## 2021-10-23 RX ADMIN — HYDROCHLOROTHIAZIDE 12.5 MG: 25 TABLET ORAL at 09:19

## 2021-10-23 NOTE — PROGRESS NOTES
Transition of Care Plan  RUR 13%    Disposition   [facility]  [phone]  [address] [place] [address]  Contact Gallito in admissions [phone]  Facility will have bed on Monday 10/25/21 for patient    RAPID COVID 19 test --Negative 10/22/21---PER GALLITO  PATIENT WILL NEED  ANOTHER COVID RAPID TEST AS   COVID test must be within 24 hours of discharge    Transportation   [facility] 10/23/21 12 noon  Cancelled---  [facility] confirmed to transposrt at   12 PM (noon)  Monday 10/25/21    Contact      Shalonda Knowles   cell [phone]/  Home [phone]    CM left message for admissions director, Nabila Chance ([phone]) this am to inquire if patient could be admitted today. KARISHMA then  talked with nurse at [facility], and she informed CM that she has not received any notification of patient being admitted today. She said normally Gallito, In admissions, informs the staff of weekend admissions. Juan Jose Becker informed CM that the Manager on Duty has not arrived so CM could not talk with the manager. Juan Jose Becker did not know the name of the manager who would be coming to work today. KARISHMA will try again later. CM talked with patient's nurse, Iris, to inform her that it has not been confirmed that patient can be admitted to [facility] today. She confirmed that patient is medically stable to be discharged if she can be admitted today. CM will provide 2nd Medicare letter to patient  prior to discharge    UPDATE 10 am  CM talked with manager on duty, Reji Chou Page regarding patient being admitted today to [facility] said she will call Gallito, (admissions director) at home and call CM back regarding patient being admitted today. UPDATE 10:29 am  CM received a call back from both Gallito (admissions director) and Reji Chou (manager) with [facility] informing CM that there will not be a room for patient until Monday 10/25/21 as patient will be on isolation for 10 days as she has not been COVID 19 vaccinated. Another COVID 19 rapid test will be required on Monday 10/25/21  CM will secure a fax number from [facility] on 10/25/21 to fax COVID test results and DC summary.    Mr Christian Martinez called by KARISHMA informing him of d/c on 10/25/21    Patient will be provided with 2nd Medicare letter prior to discharge

## 2021-10-23 NOTE — ROUTINE PROCESS
RN has completed a focused assessment on this patient and agree with the documentation of Fredy Foots, LPN.

## 2021-10-23 NOTE — PROGRESS NOTES
[facility] Adult  Hospitalist Group                                                                                          Hospitalist Progress Note  Kyle Leary MD  Answering service: [phone] from in house phone        Date of Service:  10/23/2021  NAME:  Johnathan Linares  :    MRN:  [id number]      Admission Summary:   Per medical record, Johnathan Linares is a 66 y.o. female who presents with aphasia    Patient presented to [facility] with aphasia.   No history can be obtained from the patient, apparently patient went to bed in normal state, woke up today morning with complete aphasia and right-sided weakness, she was unable to speak or stand, came to the ER, was found to have acute CVA and was requested to be admitted to the hospitalist service. No further history could be obtained from the patient or chart review    Interval history / Subjective:   Patient seen and examined  Awake and speaking few words  Still have significant dysarthria      Assessment & Plan:     Acute Left MCA ischemic stroke with right U/L weakness and aphasia -- not thrombectomy candidate as stroke completed at time of eval  CTA:  possible occlusion of the anterior division of  the left anterior cerebral artery, with a relative paucity of vessels in the  left frontal lobe anterior middle cerebral artery territory  MRI:  Moderate acute infarction of the anterior inferior left frontal lobe,  predominantly cortically based without significant superimposed hemorrhage, mass  Effect. Mild chronic microvascular ischemic change and mild cerebral atrophy.   Continuing atorvastatin  - Cards:  EKG NSR, echo not done, on tele  Continue aspirin  Plavix switched to [address] per neurology, P2 Y 12 levels pending  Neurology following, thank you recommendations  Physical therapy recommending SNF versus IPR  Working with OT  SLP working with patient, as been cleared for oral intake    Pneumonia  In setting of aphasia, concern for aspiration  COVID-19 PCR was negative  Afebrile  Aspiration precaution  abx changed to Zosyn, change to Augmentin until 11/4/21     Acute cystitis with hematuria  This was not present on admission  Urinalysis yesterday indicative of urinary tract infection  On abx    Sepsis  Present on admission  Did have leukocytosis and persistent fevers  Dynamically stable  Unclear if due to aspiration pneumonia versus cystitis, treating both    Accelerated hypertension  Blood pressure currently elevated  Continuing hydrochlorothiazide  I we will increase lisinopril to 40 mg daily  We will continue to monitor      HLD    Continuing atorvastatin    Hypokalemia  Resolving    Code status: Full    DVT prophylaxis: SCDs    Care Plan discussed with: Patient/Family     Anticipated Disposition: SAH/Rehab     Anticipated Discharge: 24 hours to 48 hours   Medically ready for discharge but SNF unable to accept until 10/25/21       Hospital Problems  Never Reviewed        Codes Class Noted POA    * (Principal) CVA (cerebral vascular accident) [facility]) ICD-10-CM: I63.9  ICD-9-CM: 434.91  10/15/2021 Unknown                Review of Systems:   Pertinent items are noted in HPI. Vital Signs:    Last 24hrs VS reviewed since prior progress note. Most recent are:  Visit Vitals  BP (!) 167/97   Pulse 72   Temp 98.2 °F (36.8 °C)   Resp 16   Ht 5' 7\" (1.702 m)   Wt 122.6 kg (270 lb 4.5 oz)   SpO2 98%   BMI 42.33 kg/m²         Intake/Output Summary (Last 24 hours) at 10/23/2021 1406  Last data filed at 10/23/2021 0509  Gross per 24 hour   Intake 450 ml   Output 800 ml   Net -350 ml        Physical Examination:             Constitutional:  No acute distress, averbal       Resp:  CTA bilaterally. No wheezing/rhonchi/rales. No accessory muscle use   CV:  Regular rhythm, normal rate, no murmurs, gallops, rubs    GI:  Soft, non distended, non tender.  Protuberant normoactive bowel sounds, no hepatosplenomegaly     Musculoskeletal:  No edema, warm, 2+ pulses throughout    Neurologic:  Right-sided hemiplegia, right facial weakness, aphasia            Data Review:    Review and/or order of clinical lab test      Labs:     Recent Labs     10/23/21  0255 10/22/21  0340   WBC 15.6* 13.1*   HGB 12.4 12.3   HCT 37.5 37.6    380     Recent Labs     10/23/21  0255 10/22/21  0340 10/21/21  0224   * 138 136   K 3.3* 3.4* 3.6    100 101   CO2 27 29 30   BUN 16 17 18   CREA 0.43* 0.44* 0.42*   * 134* 142*   CA 8.9 8.7 8.4*     Recent Labs     10/23/21  0255 10/22/21  0340 10/21/21  0224   ALT 33 35 48   AP 92 94 99   TBILI 0.6 0.6 0.8   TP 6.9 6.2* 6.4   ALB 2.2* 2.4* 2.4*   GLOB 4.7* 3.8 4.0     No results for input(s): INR, PTP, APTT, INREXT, INREXT in the last 72 hours. No results for input(s): FE, TIBC, PSAT, FERR in the last 72 hours. No results found for: FOL, RBCF   No results for input(s): PH, PCO2, PO2 in the last 72 hours. No results for input(s): CPK, CKNDX, TROIQ in the last 72 hours.     No lab exists for component: CPKMB  Lab Results   Component Value Date/Time    Cholesterol, total 162 10/16/2021 12:25 AM    HDL Cholesterol 39 10/16/2021 12:25 AM    LDL, calculated 102.6 (H) 10/16/2021 12:25 AM    Triglyceride 102 10/16/2021 12:25 AM    CHOL/HDL Ratio 4.2 10/16/2021 12:25 AM     Lab Results   Component Value Date/Time    Glucose (POC) 141 (H) 10/23/2021 11:30 AM    Glucose (POC) 132 (H) 10/23/2021 07:23 AM    Glucose (POC) 120 (H) 10/22/2021 09:31 PM    Glucose (POC) 139 (H) 10/21/2021 01:21 PM    Glucose (POC) 144 (H) 10/21/2021 09:15 AM     Lab Results   Component Value Date/Time    Color YELLOW/STRAW 10/18/2021 07:01 PM    Appearance CLOUDY (A) 10/18/2021 07:01 PM    Specific gravity 1.021 10/18/2021 07:01 PM    pH (UA) 5.5 10/18/2021 07:01 PM    Protein TRACE (A) 10/18/2021 07:01 PM    Glucose 100 (A) 10/18/2021 07:01 PM    Ketone Negative 10/18/2021 07:01 PM Bilirubin Negative 10/18/2021 07:01 PM    Urobilinogen 0.2 10/18/2021 07:01 PM    Nitrites Positive (A) 10/18/2021 07:01 PM    Leukocyte Esterase MODERATE (A) 10/18/2021 07:01 PM    Epithelial cells MODERATE (A) 10/18/2021 07:01 PM    Bacteria 2+ (A) 10/18/2021 07:01 PM    WBC 20-50 10/18/2021 07:01 PM    RBC 5-10 10/18/2021 07:01 PM         Medications Reviewed:     Current Facility-Administered Medications   Medication Dose Route Frequency    amLODIPine (NORVASC) tablet 5 mg  5 mg Oral DAILY    lisinopriL (PRINIVIL, ZESTRIL) tablet 40 mg  40 mg Oral DAILY    piperacillin-tazobactam (ZOSYN) 3.375 g in 0.9% sodium chloride (MBP/ADV) 100 mL MBP  3.375 g IntraVENous Q8H    COVID-19 vac,Ad26-PF (MICHELLE) injection 1 Dose  1 Dose IntraMUSCular PRIOR TO DISCHARGE    ticagrelor (BRILINTA) tablet 30 mg  30 mg Oral Q12H    hydroCHLOROthiazide (HYDRODIURIL) tablet 12.5 mg  12.5 mg Oral DAILY    dextrose 5 % - 0.45% NaCl infusion  75 mL/hr IntraVENous CONTINUOUS    atorvastatin (LIPITOR) tablet 40 mg  40 mg Oral DAILY    aspirin delayed-release tablet 81 mg  81 mg Oral DAILY    acetaminophen (TYLENOL) tablet 650 mg  650 mg Oral Q4H PRN    Or    acetaminophen (TYLENOL) solution 650 mg  650 mg Per NG tube Q4H PRN    Or    acetaminophen (TYLENOL) suppository 650 mg  650 mg Rectal Q4H PRN    famotidine (PF) (PEPCID) 20 mg in 0.9% sodium chloride 10 mL injection  20 mg IntraVENous Q12H     ______________________________________________________________________  EXPECTED LENGTH OF STAY: 4d 9h  ACTUAL LENGTH OF STAY:          8                 Elsy Argueta MD

## 2021-10-23 NOTE — ROUTINE PROCESS
Bedside and Verbal shift change report given to Matt (oncoming nurse) by [facility] (offgoing nurse). Report included the following information SBAR, Kardex, ED Summary, MAR, Cardiac Rhythm NSR, Quality Measures and Dual Neuro Assessment.

## 2021-10-24 PROCEDURE — 74011250637 HC RX REV CODE- 250/637: Performed by: FAMILY MEDICINE

## 2021-10-24 PROCEDURE — 65660000000 HC RM CCU STEPDOWN

## 2021-10-24 PROCEDURE — 74011000250 HC RX REV CODE- 250: Performed by: INTERNAL MEDICINE

## 2021-10-24 PROCEDURE — 74011250637 HC RX REV CODE- 250/637: Performed by: INTERNAL MEDICINE

## 2021-10-24 PROCEDURE — 74011250636 HC RX REV CODE- 250/636: Performed by: FAMILY MEDICINE

## 2021-10-24 PROCEDURE — 74011000258 HC RX REV CODE- 258: Performed by: NURSE PRACTITIONER

## 2021-10-24 PROCEDURE — 74011250636 HC RX REV CODE- 250/636: Performed by: NURSE PRACTITIONER

## 2021-10-24 PROCEDURE — 74011000250 HC RX REV CODE- 250: Performed by: FAMILY MEDICINE

## 2021-10-24 PROCEDURE — 74011250637 HC RX REV CODE- 250/637: Performed by: HOSPITALIST

## 2021-10-24 PROCEDURE — 74011250637 HC RX REV CODE- 250/637: Performed by: NURSE PRACTITIONER

## 2021-10-24 PROCEDURE — 77030038269 HC DRN EXT URIN PURWCK BARD -A

## 2021-10-24 RX ADMIN — TICAGRELOR 30 MG: 60 TABLET ORAL at 05:20

## 2021-10-24 RX ADMIN — HYDROCHLOROTHIAZIDE 12.5 MG: 25 TABLET ORAL at 09:33

## 2021-10-24 RX ADMIN — FAMOTIDINE 20 MG: 10 INJECTION, SOLUTION INTRAVENOUS at 09:31

## 2021-10-24 RX ADMIN — TICAGRELOR 30 MG: 60 TABLET ORAL at 18:03

## 2021-10-24 RX ADMIN — ATORVASTATIN CALCIUM 40 MG: 40 TABLET, FILM COATED ORAL at 09:31

## 2021-10-24 RX ADMIN — PIPERACILLIN AND TAZOBACTAM 3.38 G: 3; .375 INJECTION, POWDER, LYOPHILIZED, FOR SOLUTION INTRAVENOUS at 18:03

## 2021-10-24 RX ADMIN — ASPIRIN 81 MG: 81 TABLET, COATED ORAL at 09:31

## 2021-10-24 RX ADMIN — DEXTROSE AND SODIUM CHLORIDE 75 ML/HR: 5; 450 INJECTION, SOLUTION INTRAVENOUS at 11:02

## 2021-10-24 RX ADMIN — PIPERACILLIN AND TAZOBACTAM 3.38 G: 3; .375 INJECTION, POWDER, LYOPHILIZED, FOR SOLUTION INTRAVENOUS at 11:03

## 2021-10-24 RX ADMIN — AMLODIPINE BESYLATE 5 MG: 5 TABLET ORAL at 09:33

## 2021-10-24 RX ADMIN — FAMOTIDINE 20 MG: 10 INJECTION, SOLUTION INTRAVENOUS at 20:22

## 2021-10-24 RX ADMIN — ACETAMINOPHEN ORAL SOLUTION 650 MG: 650 SOLUTION ORAL at 16:15

## 2021-10-24 RX ADMIN — LISINOPRIL 40 MG: 20 TABLET ORAL at 09:32

## 2021-10-24 RX ADMIN — PIPERACILLIN AND TAZOBACTAM 3.38 G: 3; .375 INJECTION, POWDER, LYOPHILIZED, FOR SOLUTION INTRAVENOUS at 02:43

## 2021-10-24 NOTE — PROGRESS NOTES
Bedside and Verbal shift change report given to OKSANA Nolasco (oncoming nurse) by Vanna Coronado (offgoing nurse). Report included the following information SBAR, Kardex, Cardiac Rhythm NSR and Dual Neuro Assessment.

## 2021-10-24 NOTE — PROGRESS NOTES
[facility] [facility] Adult  Hospitalist Group                                                                                          Hospitalist Progress Note  Lacy Zamora MD  Answering service: [phone] from in house phone        Date of Service:  10/24/2021  NAME:  Angel Barillas  :    MRN:  [id number]      Admission Summary:   Per medical record, Angel Barillas is a 66 y.o. female who presents with aphasia    Patient presented to [facility] with aphasia.   No history can be obtained from the patient, apparently patient went to bed in normal state, woke up today morning with complete aphasia and right-sided weakness, she was unable to speak or stand, came to the ER, was found to have acute CVA and was requested to be admitted to the hospitalist service. No further history could be obtained from the patient or chart review    Interval history / Subjective:   Patient seen and examined  Awake and speaking few words  Apraxia       Assessment & Plan:     Acute Left MCA ischemic stroke with right U/L weakness and aphasia -- not thrombectomy candidate as stroke completed at time of eval  CTA:  possible occlusion of the anterior division of  the left anterior cerebral artery, with a relative paucity of vessels in the  left frontal lobe anterior middle cerebral artery territory  MRI:  Moderate acute infarction of the anterior inferior left frontal lobe,  predominantly cortically based without significant superimposed hemorrhage, mass  Effect. Mild chronic microvascular ischemic change and mild cerebral atrophy.   Continuing atorvastatin  - Cards:  EKG NSR, echo not done, on tele  Continue aspirin  Plavix switched to [address] per neurology, P2 Y 12 levels pending  Neurology following, thank you recommendations  Physical therapy recommending SNF versus IPR  Working with OT  SLP working with patient, as been cleared for oral intake    Pneumonia  In setting of aphasia, concern for aspiration  COVID-19 PCR was negative  Afebrile  Aspiration precaution  abx changed to Zosyn, change to Augmentin until 11/4/21     Acute cystitis with hematuria  This was not present on admission  Urinalysis yesterday indicative of urinary tract infection  On abx    Sepsis  Present on admission  Did have leukocytosis and persistent fevers  Dynamically stable  Unclear if due to aspiration pneumonia versus cystitis, treating both    Accelerated hypertension  Blood pressure currently elevated  Continuing hydrochlorothiazide  I we will increase lisinopril to 40 mg daily  We will continue to monitor      HLD    Continuing atorvastatin    Hypokalemia  Resolving    Code status: Full    DVT prophylaxis: SCDs    Care Plan discussed with: Patient/Family     Anticipated Disposition: SAH/Rehab     Anticipated Discharge: 24 hours to 48 hours   Medically ready for discharge but SNF unable to accept until 10/25/21       Hospital Problems  Never Reviewed        Codes Class Noted POA    * (Principal) CVA (cerebral vascular accident) [facility]) ICD-10-CM: I63.9  ICD-9-CM: 434.91  10/15/2021 Unknown                Review of Systems:   Pertinent items are noted in HPI. Vital Signs:    Last 24hrs VS reviewed since prior progress note. Most recent are:  Visit Vitals  BP (!) 151/60   Pulse 69   Temp 98.3 °F (36.8 °C)   Resp 18   Ht 5' 7\" (1.702 m)   Wt 121.9 kg (268 lb 11.9 oz)   SpO2 94%   BMI 42.09 kg/m²         Intake/Output Summary (Last 24 hours) at 10/24/2021 1117  Last data filed at 10/24/2021 0400  Gross per 24 hour   Intake    Output 1900 ml   Net -1900 ml        Physical Examination:             Constitutional:  No acute distress, averbal       Resp:  CTA bilaterally. No wheezing/rhonchi/rales. No accessory muscle use   CV:  Regular rhythm, normal rate, no murmurs, gallops, rubs    GI:  Soft, non distended, non tender.  Protuberant normoactive bowel sounds, no hepatosplenomegaly     Musculoskeletal: No edema, warm, 2+ pulses throughout    Neurologic:  Right-sided hemiplegia, right facial weakness, aphasia            Data Review:    Review and/or order of clinical lab test      Labs:     Recent Labs     10/23/21  0255 10/22/21  0340   WBC 15.6* 13.1*   HGB 12.4 12.3   HCT 37.5 37.6    380     Recent Labs     10/23/21  0255 10/22/21  0340   * 138   K 3.3* 3.4*    100   CO2 27 29   BUN 16 17   CREA 0.43* 0.44*   * 134*   CA 8.9 8.7     Recent Labs     10/23/21  0255 10/22/21  0340   ALT 33 35   AP 92 94   TBILI 0.6 0.6   TP 6.9 6.2*   ALB 2.2* 2.4*   GLOB 4.7* 3.8     No results for input(s): INR, PTP, APTT, INREXT, INREXT in the last 72 hours. No results for input(s): FE, TIBC, PSAT, FERR in the last 72 hours. No results found for: FOL, RBCF   No results for input(s): PH, PCO2, PO2 in the last 72 hours. No results for input(s): CPK, CKNDX, TROIQ in the last 72 hours.     No lab exists for component: CPKMB  Lab Results   Component Value Date/Time    Cholesterol, total 162 10/16/2021 12:25 AM    HDL Cholesterol 39 10/16/2021 12:25 AM    LDL, calculated 102.6 (H) 10/16/2021 12:25 AM    Triglyceride 102 10/16/2021 12:25 AM    CHOL/HDL Ratio 4.2 10/16/2021 12:25 AM     Lab Results   Component Value Date/Time    Glucose (POC) 130 (H) 10/23/2021 04:09 PM    Glucose (POC) 141 (H) 10/23/2021 11:30 AM    Glucose (POC) 132 (H) 10/23/2021 07:23 AM    Glucose (POC) 120 (H) 10/22/2021 09:31 PM    Glucose (POC) 139 (H) 10/21/2021 01:21 PM     Lab Results   Component Value Date/Time    Color YELLOW/STRAW 10/18/2021 07:01 PM    Appearance CLOUDY (A) 10/18/2021 07:01 PM    Specific gravity 1.021 10/18/2021 07:01 PM    pH (UA) 5.5 10/18/2021 07:01 PM    Protein TRACE (A) 10/18/2021 07:01 PM    Glucose 100 (A) 10/18/2021 07:01 PM    Ketone Negative 10/18/2021 07:01 PM    Bilirubin Negative 10/18/2021 07:01 PM    Urobilinogen 0.2 10/18/2021 07:01 PM    Nitrites Positive (A) 10/18/2021 07:01 PM Leukocyte Esterase MODERATE (A) 10/18/2021 07:01 PM    Epithelial cells MODERATE (A) 10/18/2021 07:01 PM    Bacteria 2+ (A) 10/18/2021 07:01 PM    WBC 20-50 10/18/2021 07:01 PM    RBC 5-10 10/18/2021 07:01 PM         Medications Reviewed:     Current Facility-Administered Medications   Medication Dose Route Frequency    amLODIPine (NORVASC) tablet 5 mg  5 mg Oral DAILY    lisinopriL (PRINIVIL, ZESTRIL) tablet 40 mg  40 mg Oral DAILY    piperacillin-tazobactam (ZOSYN) 3.375 g in 0.9% sodium chloride (MBP/ADV) 100 mL MBP  3.375 g IntraVENous Q8H    COVID-19 vac,Ad26-PF (Motosmarty) injection 1 Dose  1 Dose IntraMUSCular PRIOR TO DISCHARGE    ticagrelor (BRILINTA) tablet 30 mg  30 mg Oral Q12H    hydroCHLOROthiazide (HYDRODIURIL) tablet 12.5 mg  12.5 mg Oral DAILY    dextrose 5 % - 0.45% NaCl infusion  75 mL/hr IntraVENous CONTINUOUS    atorvastatin (LIPITOR) tablet 40 mg  40 mg Oral DAILY    aspirin delayed-release tablet 81 mg  81 mg Oral DAILY    acetaminophen (TYLENOL) tablet 650 mg  650 mg Oral Q4H PRN    Or    acetaminophen (TYLENOL) solution 650 mg  650 mg Per NG tube Q4H PRN    Or    acetaminophen (TYLENOL) suppository 650 mg  650 mg Rectal Q4H PRN    famotidine (PF) (PEPCID) 20 mg in 0.9% sodium chloride 10 mL injection  20 mg IntraVENous Q12H     ______________________________________________________________________  EXPECTED LENGTH OF STAY: 4d 9h  ACTUAL LENGTH OF STAY:          9                 Armando Stevens MD

## 2021-10-25 VITALS
WEIGHT: 271.83 LBS | OXYGEN SATURATION: 93 % | RESPIRATION RATE: 23 BRPM | DIASTOLIC BLOOD PRESSURE: 94 MMHG | HEIGHT: 67 IN | TEMPERATURE: 97.9 F | SYSTOLIC BLOOD PRESSURE: 129 MMHG | HEART RATE: 78 BPM | BODY MASS INDEX: 42.66 KG/M2

## 2021-10-25 LAB
ANION GAP SERPL CALC-SCNC: 4 MMOL/L (ref 5–15)
BUN SERPL-MCNC: 14 MG/DL (ref 6–20)
BUN/CREAT SERPL: 27 (ref 12–20)
CALCIUM SERPL-MCNC: 9 MG/DL (ref 8.5–10.1)
CHLORIDE SERPL-SCNC: 102 MMOL/L (ref 97–108)
CO2 SERPL-SCNC: 29 MMOL/L (ref 21–32)
COVID-19 RAPID TEST, COVR: NOT DETECTED
CREAT SERPL-MCNC: 0.52 MG/DL (ref 0.55–1.02)
GLUCOSE SERPL-MCNC: 133 MG/DL (ref 65–100)
POTASSIUM SERPL-SCNC: 3.5 MMOL/L (ref 3.5–5.1)
SODIUM SERPL-SCNC: 135 MMOL/L (ref 136–145)
SOURCE, COVRS: NORMAL

## 2021-10-25 PROCEDURE — 74011000258 HC RX REV CODE- 258: Performed by: NURSE PRACTITIONER

## 2021-10-25 PROCEDURE — 74011000250 HC RX REV CODE- 250: Performed by: INTERNAL MEDICINE

## 2021-10-25 PROCEDURE — 74011250637 HC RX REV CODE- 250/637: Performed by: FAMILY MEDICINE

## 2021-10-25 PROCEDURE — 74011000250 HC RX REV CODE- 250: Performed by: FAMILY MEDICINE

## 2021-10-25 PROCEDURE — 74011250636 HC RX REV CODE- 250/636: Performed by: NURSE PRACTITIONER

## 2021-10-25 PROCEDURE — 74011250637 HC RX REV CODE- 250/637: Performed by: NURSE PRACTITIONER

## 2021-10-25 PROCEDURE — 80048 BASIC METABOLIC PNL TOTAL CA: CPT

## 2021-10-25 PROCEDURE — 87635 SARS-COV-2 COVID-19 AMP PRB: CPT

## 2021-10-25 PROCEDURE — 92507 TX SP LANG VOICE COMM INDIV: CPT

## 2021-10-25 PROCEDURE — 74011250637 HC RX REV CODE- 250/637: Performed by: INTERNAL MEDICINE

## 2021-10-25 PROCEDURE — 92526 ORAL FUNCTION THERAPY: CPT

## 2021-10-25 PROCEDURE — 36415 COLL VENOUS BLD VENIPUNCTURE: CPT

## 2021-10-25 PROCEDURE — 74011250637 HC RX REV CODE- 250/637: Performed by: HOSPITALIST

## 2021-10-25 PROCEDURE — 74011250636 HC RX REV CODE- 250/636: Performed by: FAMILY MEDICINE

## 2021-10-25 RX ORDER — LISINOPRIL 40 MG/1
40 TABLET ORAL DAILY
Qty: 30 TABLET | Refills: 0 | Status: SHIPPED
Start: 2021-10-26

## 2021-10-25 RX ORDER — HYDROCHLOROTHIAZIDE 12.5 MG/1
12.5 TABLET ORAL DAILY
Qty: 30 TABLET | Refills: 0 | Status: SHIPPED
Start: 2021-10-25

## 2021-10-25 RX ORDER — AMOXICILLIN AND CLAVULANATE POTASSIUM 875; 125 MG/1; MG/1
1 TABLET, FILM COATED ORAL 2 TIMES DAILY
Qty: 20 TABLET | Refills: 0 | Status: SHIPPED
Start: 2021-10-25

## 2021-10-25 RX ORDER — ATORVASTATIN CALCIUM 40 MG/1
40 TABLET, FILM COATED ORAL DAILY
Qty: 30 TABLET | Refills: 0 | Status: SHIPPED
Start: 2021-10-25

## 2021-10-25 RX ORDER — AMLODIPINE BESYLATE 5 MG/1
5 TABLET ORAL DAILY
Qty: 30 TABLET | Refills: 0 | Status: SHIPPED
Start: 2021-10-26

## 2021-10-25 RX ADMIN — AMLODIPINE BESYLATE 5 MG: 5 TABLET ORAL at 10:02

## 2021-10-25 RX ADMIN — LISINOPRIL 40 MG: 20 TABLET ORAL at 10:01

## 2021-10-25 RX ADMIN — FAMOTIDINE 20 MG: 10 INJECTION, SOLUTION INTRAVENOUS at 10:43

## 2021-10-25 RX ADMIN — DEXTROSE AND SODIUM CHLORIDE 75 ML/HR: 5; 450 INJECTION, SOLUTION INTRAVENOUS at 00:23

## 2021-10-25 RX ADMIN — PIPERACILLIN AND TAZOBACTAM 3.38 G: 3; .375 INJECTION, POWDER, LYOPHILIZED, FOR SOLUTION INTRAVENOUS at 11:12

## 2021-10-25 RX ADMIN — TICAGRELOR 30 MG: 60 TABLET ORAL at 06:17

## 2021-10-25 RX ADMIN — ATORVASTATIN CALCIUM 40 MG: 40 TABLET, FILM COATED ORAL at 10:02

## 2021-10-25 RX ADMIN — HYDROCHLOROTHIAZIDE 12.5 MG: 25 TABLET ORAL at 10:02

## 2021-10-25 RX ADMIN — PIPERACILLIN AND TAZOBACTAM 3.38 G: 3; .375 INJECTION, POWDER, LYOPHILIZED, FOR SOLUTION INTRAVENOUS at 02:21

## 2021-10-25 RX ADMIN — ASPIRIN 81 MG: 81 TABLET, COATED ORAL at 10:02

## 2021-10-25 NOTE — PROGRESS NOTES
Transition of Care Plan to SNF/Rehab    SNF/Rehab Transition:  Patient has been accepted to [facility] and meets criteria for admission. Patient will transported by [facility] and expected to leave at 12pm.    Communication to Patient/Family:  Met with patient and Beltran Jara and they are agreeable to the transition plan. Communication to SNF/Rehab:  Bedside RN, Nancy Rodriguez, has been notified to update the transition plan to the facility and call report (phone number [phone] [facility]). Discharge information has been updated on the AVS.     Discharge instructions to be fax'd to facility at [facility] # [email or web address]). Nursing Please include all hard scripts for controlled substances, med rec and dc summary, and AVS in packet. Reviewed and confirmed with facility, [facility], can manage the patient care needs for the following:     Fatimah Cerda with (X) only those applicable:    Medication:  []  Medications will be available at the facility  []  IV Antibiotics  []  Controlled Substance - hard copy to be sent with patient   [x]  Weekly Labs   Documents:  [x] Hard RX  [x] MAR  [x] Kardex  [x] AVS  [x]Transfer Summary  [x]Discharge   Equipment:  []  CPAP/BiPAP  []  Wound Vacuum  []  Yu or Urinary Device  []  PICC/Central Line  []  Nebulizer  []  Ventilator   Treatment:  []Isolation (for MRSA, VRE, etc.)  []Surgical Drain Management  []Tracheostomy Care  []Dressing Changes  []Dialysis with transportation and chair time   []PEG Care  []Oxygen  []Daily Weights for Heart Failure   Dietary:  []Any diet limitations  []Tube Feedings   []Total Parenteral Management (TPN)   Eligible for Medicaid Long Term Services and Supports  Yes:  [] Eligible for medical assistance or will become eligible within 180 days and UAI completed. [x] Provider/Patient and/or support system has requested screening.   [x] UAI copy provided to patient or responsible party, Bebe Hickey  [] UAI unavailable at discharge will send once processed to SNF provider. [] UAI unavailable at discharged mailed to patient  No:   [x] Private pay and is not financially eligible for Medicaid within the next 180 days. [] Reside out-of-state. [] A residents of a state owned/operated facility that is licensed  by [address] [facility] or [facility]  [] Enrollment in [facility] hospice services  [] [address]  [] Patient /Family declines to have screening completed or provide financial information for screening     Financial Resources:  Medicaid    [] Initiated and application pending   [] Full coverage     Advanced Care Plan:  []Surrogate Decision Maker of Care  []POA  [x]Communicated Code Status FULL   Other     Cait Saeedast) AYAH SadlerSANETTE.

## 2021-10-25 NOTE — PROGRESS NOTES
Problem: Dysphagia (Adult)  Goal: *Acute Goals and Plan of Care (Insert Text)  Description: Speech Pathology Goals    Added 10/25/2021  1. Patient will tolerate easy to chew diet/thin liquids with no difficulties or adverse effects within 7 days. Initiated 10/18/2021  1. Patient will tolerate ice chips with no adverse effects within 7 days. MET 10/25/2021  2. Patient will participate in swallow reassessment within 7 days. MET 10/25/2021    Outcome: Progressing Towards Goal     Problem: Communication Impaired (Adult)  Goal: *Acute Goals and Plan of Care (Insert Text)  Description: Speech Pathology Goals    Initiated 10/18/2021  1. Patient will respond to simple Yes/No questions with 80% accuracy within 7 days Continue 10/25/2021  2. Patient will follow simple one step commands with 80% accuracy within 7 days. MET 10/25/2021 upgrade to simple two-step  3. Patient will name simple objects of ADL with 80% accuracy given mod cues within 7 days. Continue 10/25/2021    Outcome: Progressing Towards Goal     SPEECH LANGUAGE PATHOLOGY DYSPHAGIA AND SPEECH TREATMENT  Patient: Tien Brooke (89 y.o. female)  Date: 10/25/2021  Diagnosis: CVA (cerebral vascular accident) [facility]) [I63.9] CVA (cerebral vascular accident) [facility])       Precautions: Fall, Skin    ASSESSMENT:  Patient has been tolerating puree diet/thin liquids with no difficulties or adverse effects. Pt with improved oral motor strength and sensation on this date. Pt with slightly prolonged however efficient mastication of solid with no oral residue appreciated. Given bedside presentation, recommend upgrade to easy to chew diet/thin liquids with precautions as outlined below. SLP to follow for diet tolerance and upgrade as indicated. Patient also continues to show progress with receptive and expressive language. Pt with significant improvement in ability to follow simple and and two step commands. Pt continues to be unreliable with simple Yes/No questions. Pt also with more automatic verbalizations and increased attempts at volitional verbalizations in expressive language tasks. Expressive language continues to be characterized by non-fluent output with inconsistent errors, paraphasias and oral groping. Recommend continues SLP intervention for speech/language at this and next levels of care. PLAN:  Recommendations and Planned Interventions:  -- easy to chew diet/thin liquids  -- assistance during mealtimes  -- alternate liquids and solids  -- SLP to follow for speech/swallow treatment    Patient continues to benefit from skilled intervention to address the above impairments. Continue treatment per established plan of care. Discharge Recommendations:  Skilled Nursing Facility     SUBJECTIVE:   Patient stated \"dudley\" when asked to repeat cat. Then saidthank you fluently with no paraphasias after session. OBJECTIVE:   Cognitive and Communication Status:  Neurologic State: Alert  Orientation Level: Other (Comment) (unable to assess 2/2 receptive aphasia)  Cognition: Unable to assess (comment) (2/2 receptive language deficits)  Perception: Cues to attend to right side of body  Perseveration: No perseveration noted  Safety/Judgement: Awareness of environment    Dysphagia Treatment and Interventions:  Oral Assessment:  Oral Assessment  Labial: No impairment  Dentition: Intact; Natural  Oral Hygiene: moist oral mucosa free of secretions  Lingual: Decreased strength  Velum: No impairment  Mandible: No impairment  P.O. Trials:  Patient Position: upright in bed  Vocal quality prior to P.O.: No impairment  Consistency Presented: Thin liquid;Puree; Solid  How Presented: Self-fed/presented     Bolus Acceptance: No impairment  Bolus Formation/Control: No impairment     Propulsion: No impairment  Oral Residue: None  Initiation of Swallow: No impairment  Laryngeal Elevation: Functional  Aspiration Signs/Symptoms: None  Pharyngeal Phase Characteristics: No impairment, issues, or problems   Effective Modifications: Alternate liquids/solids  Cues for Modifications: Minimal       Oral Phase Severity: Mild  Pharyngeal Phase Severity : No impairment    Speech Treatment and Interventions: Motor Speech:     Intelligibility: Impaired  Word Intelligibility (%): 25 %  Phrase Intelligibility (%): 0 %           Speech Characteristics: Neologisms;Paraphasias  Apraxic Characteristics: Abnormal prosodic features;Errors increase as phonemic sequence increases; Fewer errors in automatic speech than volitional speech; Inconsistent errors;Marked difficulty initiating speech;Numerous attempts at the word; Phonemic errors; Visible/audible searching  Dysarthric Characteristics: None  Interfering Components: Limited error awareness; Attention  Language Comprehension and Expression:  Auditory Comprehension   Auditory Impairment: Yes  Response to Basic Yes/No Questions (%): 65 %  One-Step Basic Commands (%): 100 %  Two-Step Basic Commands (%): 75 %  Interfering Components: Processing speed; Motor planning; Other (comment) (error awareness)  Effective Techniques: Extra processing time;Repetition  Verbal Expression  Verbal Expression  Primary Mode of Expression: Verbal  Initiation: Impaired (%)  Automatic Speech Task: Impaired (comment)  Automatic speech task cueing type: Repetition;Imitation  Automatic speech task cueing amount: Minimal  Repetition: Impaired  Word Repetition (%): 0 %  Naming: Impaired  Confrontation (%): 0 %  Conversation: Apraxic;Non-fluent  Speech Characteristics: Neologisms;Paraphasias  Interfering Components: Limited error awareness; Attention  Effective Techniques: Decreased rate;Provide extra time  Overall Impairment: Moderate-severe  Cueing type: Multi modality  Cueing amount:  Moderate  After treatment:   Patient left in no apparent distress in bed, Call bell within reach, and Nursing notified    COMMUNICATION/EDUCATION:   Patient was educated regarding her deficit(s) of aphasia/dysphagia as this relates to her diagnosis of CVA. She demonstrated Guarded understanding as evidenced by receptive language deficits. The patient's plan of care including recommendations, planned interventions, and recommended diet changes were discussed with: Registered nurse.      Jhony Brand M.S. CF-SLP   Speech Language Pathologist     Time Calculation: 19 mins

## 2021-10-25 NOTE — PROGRESS NOTES
Patient discharged to [facility]. telemerety and IV  Discontinued, report phone to Nurse at [facility].

## 2021-10-25 NOTE — PROGRESS NOTES
Bedside and Verbal shift change report given to REBECCA Simmons (oncoming nurse) by Raimundo Jung (offgoing nurse). Report included the following information SBAR, Kardex, Cardiac Rhythm NSR and Dual Neuro Assessment.

## 2021-10-25 NOTE — DISCHARGE INSTRUCTIONS
Discharging provider: Ashlee Gamble MD    Primary care provider: None    FINAL [facility] COURSE:    66 y. o. female who presents with aphasia. Patient presented to [facility] with aphasia. Apparently patient went to bed in normal state, woke up today morning with complete aphasia and right-sided weakness, she was unable to speak or stand, came to the ER, was found to have acute CVA and was requested to be admitted to the hospitalist service. Acute Left MCA ischemic stroke with right U/L weakness and aphasia -- not thrombectomy candidate as stroke completed at time of eval  - CTA:  possible occlusion of the anterior division of the left anterior cerebral artery, with a relative paucity of vessels in the left frontal lobe anterior middle cerebral artery territory    - MRI:  Moderate acute infarction of the anterior inferior left frontal lobe, predominantly cortically based without significant superimposed hemorrhage, mass  Effect.  Mild chronic microvascular ischemic change and mild cerebral atrophy  - Cards:  EKG NSR, echo not done, on tele  - Continue aspirin 81mg , Lipitor   - Plavix switched to Brilinta per neurology,   - Neurology follow up outpatient in 1 month   - Pureed diet   - Echo with Normal EF, no PFO     Pneumonia  In setting of aphasia, concern for aspiration  COVID-19 PCR was negative  Afebrile  Aspiration precaution  abx changed to Zosyn, change to Augmentin until 11/4/21      Acute cystitis with hematuria  This was not present on admission  Urinalysis yesterday indicative of urinary tract infection  On abx     Sepsis  Present on admission  Did have leukocytosis and persistent fevers  Dynamically stable  Unclear if due to aspiration pneumonia versus cystitis, treating both     Accelerated hypertension  Blood pressure currently elevated  Continuing hydrochlorothiazide  increase lisinopril to 40 mg daily, added amlodipine 5mg     HLD    Continuing atorvastatin     Hypokalemia  Resolved    FOLLOW-UP CARE RECOMMENDATIONS:    APPOINTMENTS:  Follow-up Information     Follow up With Specialties Details Why Contact Info    Your PCP   In 3 weeks Discharge follow up      Latosha Oglesby, DO Neurology In 1 month Stroke follow up  [address] [place] [address]  [phone]            FOLLOW-UP TESTS RECOMMENDED:   · NONE    ONGOING TREATMENT PLAN:  As above     PENDING TEST RESULTS:  At the time of discharge the following test results are still pending: none. Please review these results as they become available. Specific symptoms to watch for: chest pain, shortness of breath, fever, chills, nausea, vomiting, diarrhea, change in mentation, falling, weakness, bleeding. DIET:  Dysphagia diet-pureed, advance as tolerated with speech therapy    ACTIVITY:  Activity as tolerated    WOUND CARE: *** None needed    EQUIPMENT needed:  NONE    INCIDENTAL FINDINGS:  NONE    GOALS OF CARE:  X  Eventual return to home/independent/assisted living     Long term SNF      Hospice     No rehospitalization     Patient condition at discharge:   Functional status  X  Poor      Deconditioned      Independent   Cognition  X  Lucid     Forgetful (some sensescence)     Dementia   Catheters/lines (plus indication)    Yu     PICC      PEG         Code status  X  Full code      DNR    . . . . . . . . . . . . . . . . . . . . . . . . . . . . . . . . . . . . . . . . . . . . . . . . . . . . . . . . . . . . . . . . . . . . . . . [place]      CHRONIC MEDICAL CONDITIONS:  Problem List as of 10/25/2021 Never Reviewed        Codes Class Noted - Resolved    * (Principal) [facility] ICD-10-CM: I63.9  ICD-9-CM: 434.91  10/15/2021 - Present

## 2021-10-25 NOTE — DISCHARGE SUMMARY
Discharge Summary     Patient:  Dayana Hairston       MRN: [id number]       YOB: 1950       Age: 66 y.o. Date of admission:  10/15/2021    Date of discharge:  10/25/2021    Primary care provider: Dr. Mihai Roe    Admitting provider:  Khari Epstein MD    Discharging provider:  Adarsh Alberto MD - [phone]  If unavailable, call [phone] and ask the  to page the triage hospitalist.    Consultations  · IP CONSULT TO NEUROLOGY  · IP CONSULT TO INFECTIOUS DISEASES    Procedures  · * No surgery found *    Discharge destination: SNF. The patient is stable for discharge. Admission diagnosis  · CVA (cerebral vascular accident) ([facility] [I63.9]    Current Discharge Medication List      START taking these medications    Details   amLODIPine (NORVASC) 5 mg tablet Take 1 Tablet by mouth daily. Qty: 30 Tablet, Refills: 0  Start date: 10/26/2021      lisinopriL (PRINIVIL, ZESTRIL) 40 mg tablet Take 1 Tablet by mouth daily. Qty: 30 Tablet, Refills: 0  Start date: 10/26/2021      ticagrelor (BRILINTA) 60 mg tab tablet Take 0.5 Tablets by mouth every twelve (12) hours every twelve (12) hours. Qty: 60 Tablet, Refills: 2  Start date: 10/25/2021      amoxicillin-clavulanate (Augmentin) 875-125 mg per tablet Take 1 Tablet by mouth two (2) times a day. Qty: 20 Tablet, Refills: 0  Start date: 10/25/2021         CONTINUE these medications which have CHANGED    Details   atorvastatin (LIPITOR) 40 mg tablet Take 1 Tablet by mouth daily. Qty: 30 Tablet, Refills: 0  Start date: 10/25/2021      hydroCHLOROthiazide (HYDRODIURIL) 12.5 mg tablet Take 1 Tablet by mouth daily. Qty: 30 Tablet, Refills: 0  Start date: 10/25/2021         CONTINUE these medications which have NOT CHANGED    Details   aspirin delayed-release 81 mg tablet Take 81 mg by mouth daily.       cholecalciferol (VITAMIN D3) (1000 Units /25 mcg) tablet Take 1,000 Units by mouth daily. therapeutic multivitamin (THERAGRAN) tablet Take 1 Tablet by mouth daily. B.infantis-B.ani-B.long-B.bifi (Probiotic 4X) 10-15 mg TbEC Take  by mouth daily. STOP taking these medications       naproxen (NAPROSYN) 500 mg tablet Comments:   Reason for Stopping: Follow-up Information     Follow up With Specialties Details Why Contact Info    Your PCP   In 3 weeks Discharge follow up      Sofi Esqueda, DO Neurology In 1 month Stroke follow up  [address] [place] [address]  [phone]      None    None (974) Patient stated that they have no PCP            Final discharge diagnoses and brief hospital course  Please also refer to the admission H&P for details on the presenting problem. 66 y. o. female who presents with aphasia. Patient presented to [facility] with aphasia. Apparently patient went to bed in normal state, woke up today morning with complete aphasia and right-sided weakness, she was unable to speak or stand, came to the ER, was found to have acute CVA and was requested to be admitted to the hospitalist service. Acute Left MCA ischemic stroke with right U/L weakness and aphasia -- not thrombectomy candidate as stroke completed at time of eval  - CTA:  possible occlusion of the anterior division of the left anterior cerebral artery, with a relative paucity of vessels in the left frontal lobe anterior middle cerebral artery territory    - MRI:  Moderate acute infarction of the anterior inferior left frontal lobe, predominantly cortically based without significant superimposed hemorrhage, mass  Effect.  Mild chronic microvascular ischemic change and mild cerebral atrophy  - Cards:  EKG NSR, echo not done, on tele  - Continue aspirin 81mg , Lipitor   - Plavix switched to Brilinta per neurology,   - Neurology follow up outpatient in 1 month   - Pureed diet   - Echo with Normal EF, no PFO     Pneumonia  In setting of aphasia, concern for aspiration  COVID-19 PCR was negative  Afebrile  Aspiration precaution  abx changed to Zosyn, change to Augmentin until 11/4/21      Acute cystitis with hematuria  This was not present on admission  Urinalysis yesterday indicative of urinary tract infection  On abx     Sepsis  Present on admission  Did have leukocytosis and persistent fevers  Dynamically stable  Unclear if due to aspiration pneumonia versus cystitis, treating both     Accelerated hypertension  Blood pressure currently elevated  Continuing hydrochlorothiazide  increase lisinopril to 40 mg daily, added amlodipine 5mg     HLD    Continuing atorvastatin     Hypokalemia  Resolved    FOLLOW-UP CARE RECOMMENDATIONS:    FOLLOW-UP TESTS RECOMMENDED:   · NONE    ONGOING TREATMENT PLAN:  As above     PENDING TEST RESULTS:  At the time of discharge the following test results are still pending: none. Please review these results as they become available. Specific symptoms to watch for: chest pain, shortness of breath, fever, chills, nausea, vomiting, diarrhea, change in mentation, falling, weakness, bleeding.     DIET:  Dysphagia diet-pureed, advance as tolerated with speech therapy    ACTIVITY:  Activity as tolerated    WOUND CARE:  None needed    EQUIPMENT needed:  NONE    INCIDENTAL FINDINGS:  NONE    GOALS OF CARE:  X  Eventual return to home/independent/assisted living     Long term SNF      Hospice     No rehospitalization     Patient condition at discharge:   Functional status  X  Poor      Deconditioned      Independent   Cognition  X  Lucid     Forgetful (some sensescence)     Dementia   Catheters/lines (plus indication)    Yu     PICC      PEG         Code status  X  Full code      DNR      Physical examination at discharge  Visit Vitals  BP (!) 146/89 (BP 1 Location: Left arm, BP Patient Position: At rest)   Pulse 66   Temp 98.3 °F (36.8 °C)   Resp 23   Ht 5' 7\" (1.702 m)   Wt 123.3 kg (271 lb 13.2 oz) SpO2 92%   BMI 42.57 kg/m²     Awake and alert  Apraxia  Rt hemiparesis  Lung Clear  CVS: RRR  Abd: soft NT ND   Ext: no edema     Pertinent imaging studies:    See radiology      Recent Labs     10/23/21  0255   WBC 15.6*   HGB 12.4   HCT 37.5        Recent Labs     10/25/21  0037 10/23/21  0255   * 132*   K 3.5 3.3*    100   CO2 29 27   BUN 14 16   CREA 0.52* 0.43*   * 154*   CA 9.0 8.9     Recent Labs     10/23/21  0255   AP 92   TP 6.9   ALB 2.2*   GLOB 4.7*     No results for input(s): INR, PTP, APTT, INREXT in the last 72 hours. No results for input(s): FE, TIBC, PSAT, FERR in the last 72 hours. No results for input(s): PH, PCO2, PO2 in the last 72 hours. No results for input(s): CPK, CKMB in the last 72 hours. No lab exists for component: TROPONINI  No components found for: [place]    Chronic Diagnoses:    Problem List as of 10/25/2021 Never Reviewed        Codes Class Noted - Resolved    * (Principal) CVA (cerebral vascular accident) [facility]) ICD-10-CM: I63.9  ICD-9-CM: 434.91  10/15/2021 - Present              Time spent on discharge related activities today greater than 30 minutes.       Signed:  Zoraida Wolf MD                 Hospitalist, Internal Medicine      Cc: None

## 2021-10-25 NOTE — PROGRESS NOTES
Transition of Care Plan   RUR- Low 12%   DISPOSITION: SNF - [address] F/U with PCP/Specialist     Transport: [facility] 12pm    CM left message for Addis ([phone]) in Admissions with [facility] regarding DC today. [facility] scheduled for 12pm.    Jenny Egan M.S.OTONIEL.

## 2022-03-19 PROBLEM — I63.9 CVA (CEREBRAL VASCULAR ACCIDENT) (HCC): Status: ACTIVE | Noted: 2021-10-15

## 2022-05-12 ENCOUNTER — HOSPITAL ENCOUNTER (EMERGENCY)
Age: 72
Discharge: HOME | End: 2022-05-12
Attending: EMERGENCY MEDICINE
Payer: MEDICARE

## 2022-05-12 ENCOUNTER — APPOINTMENT (OUTPATIENT)
Dept: GENERAL RADIOLOGY | Age: 72
End: 2022-05-12
Attending: EMERGENCY MEDICINE
Payer: MEDICARE

## 2022-05-12 VITALS
RESPIRATION RATE: 33 BRPM | WEIGHT: 230.7 LBS | SYSTOLIC BLOOD PRESSURE: 133 MMHG | BODY MASS INDEX: 36.13 KG/M2 | HEART RATE: 103 BPM | OXYGEN SATURATION: 97 % | DIASTOLIC BLOOD PRESSURE: 93 MMHG | TEMPERATURE: 97.7 F

## 2022-05-12 DIAGNOSIS — J81.0 ACUTE PULMONARY EDEMA (HCC): Primary | ICD-10-CM

## 2022-05-12 DIAGNOSIS — I48.11 LONGSTANDING PERSISTENT ATRIAL FIBRILLATION (HCC): ICD-10-CM

## 2022-05-12 DIAGNOSIS — R06.02 SHORTNESS OF BREATH: ICD-10-CM

## 2022-05-12 LAB
ALBUMIN SERPL-MCNC: 3.4 G/DL (ref 3.5–5)
ALBUMIN/GLOB SERPL: 1 {RATIO} (ref 1.1–2.2)
ALP SERPL-CCNC: 87 U/L (ref 45–117)
ALT SERPL-CCNC: 23 U/L (ref 12–78)
ANION GAP SERPL CALC-SCNC: 10 MMOL/L (ref 5–15)
APPEARANCE UR: CLEAR
APTT PPP: 27.1 SEC (ref 22.1–31)
AST SERPL-CCNC: 20 U/L (ref 15–37)
BACTERIA URNS QL MICRO: ABNORMAL /HPF
BASOPHILS # BLD: 0.1 K/UL (ref 0–0.1)
BASOPHILS NFR BLD: 1 % (ref 0–1)
BILIRUB SERPL-MCNC: 0.6 MG/DL (ref 0.2–1)
BILIRUB UR QL: NEGATIVE
BNP SERPL-MCNC: 2871 PG/ML (ref 0–125)
BUN SERPL-MCNC: 23 MG/DL (ref 6–20)
BUN/CREAT SERPL: 27 (ref 12–20)
CALCIUM SERPL-MCNC: 9 MG/DL (ref 8.5–10.1)
CHLORIDE SERPL-SCNC: 109 MMOL/L (ref 97–108)
CO2 SERPL-SCNC: 27 MMOL/L (ref 21–32)
COLOR UR: ABNORMAL
CREAT SERPL-MCNC: 0.85 MG/DL (ref 0.55–1.02)
DIFFERENTIAL METHOD BLD: ABNORMAL
EOSINOPHIL # BLD: 0.1 K/UL (ref 0–0.4)
EOSINOPHIL NFR BLD: 1 % (ref 0–7)
EPITH CASTS URNS QL MICRO: ABNORMAL /LPF
ERYTHROCYTE [DISTWIDTH] IN BLOOD BY AUTOMATED COUNT: 17.4 % (ref 11.5–14.5)
FLUAV RNA SPEC QL NAA+PROBE: NOT DETECTED
FLUBV RNA SPEC QL NAA+PROBE: NOT DETECTED
GLOBULIN SER CALC-MCNC: 3.4 G/DL (ref 2–4)
GLUCOSE SERPL-MCNC: 136 MG/DL (ref 65–100)
GLUCOSE UR STRIP.AUTO-MCNC: NEGATIVE MG/DL
HCT VFR BLD AUTO: 34.7 % (ref 35–47)
HGB BLD-MCNC: 10.7 G/DL (ref 11.5–16)
HGB UR QL STRIP: NEGATIVE
IMM GRANULOCYTES # BLD AUTO: 0 K/UL (ref 0–0.04)
IMM GRANULOCYTES NFR BLD AUTO: 0 % (ref 0–0.5)
INR PPP: 1.1 (ref 0.9–1.1)
KETONES UR QL STRIP.AUTO: NEGATIVE MG/DL
LACTATE SERPL-SCNC: 1.7 MMOL/L (ref 0.4–2)
LEUKOCYTE ESTERASE UR QL STRIP.AUTO: ABNORMAL
LYMPHOCYTES # BLD: 3 K/UL (ref 0.8–3.5)
LYMPHOCYTES NFR BLD: 31 % (ref 12–49)
MCH RBC QN AUTO: 28.5 PG (ref 26–34)
MCHC RBC AUTO-ENTMCNC: 30.8 G/DL (ref 30–36.5)
MCV RBC AUTO: 92.5 FL (ref 80–99)
MONOCYTES # BLD: 0.7 K/UL (ref 0–1)
MONOCYTES NFR BLD: 7 % (ref 5–13)
NEUTS SEG # BLD: 5.9 K/UL (ref 1.8–8)
NEUTS SEG NFR BLD: 60 % (ref 32–75)
NITRITE UR QL STRIP.AUTO: NEGATIVE
NRBC # BLD: 0 K/UL (ref 0–0.01)
NRBC BLD-RTO: 0 PER 100 WBC
PH UR STRIP: 6.5 [PH] (ref 5–8)
PLATELET # BLD AUTO: 362 K/UL (ref 150–400)
PMV BLD AUTO: 10.2 FL (ref 8.9–12.9)
POTASSIUM SERPL-SCNC: 4.3 MMOL/L (ref 3.5–5.1)
PROT SERPL-MCNC: 6.8 G/DL (ref 6.4–8.2)
PROT UR STRIP-MCNC: NEGATIVE MG/DL
PROTHROMBIN TIME: 11.4 SEC (ref 9–11.1)
RBC # BLD AUTO: 3.75 M/UL (ref 3.8–5.2)
RBC #/AREA URNS HPF: ABNORMAL /HPF (ref 0–5)
SARS-COV-2, COV2: NOT DETECTED
SODIUM SERPL-SCNC: 146 MMOL/L (ref 136–145)
SP GR UR REFRACTOMETRY: 1.01 (ref 1–1.03)
THERAPEUTIC RANGE,PTTT: NORMAL SECS (ref 58–77)
TROPONIN-HIGH SENSITIVITY: 10 NG/L (ref 0–51)
TROPONIN-HIGH SENSITIVITY: 9 NG/L (ref 0–51)
UA: UC IF INDICATED,UAUC: ABNORMAL
UROBILINOGEN UR QL STRIP.AUTO: 0.2 EU/DL (ref 0.2–1)
WBC # BLD AUTO: 9.8 K/UL (ref 3.6–11)
WBC URNS QL MICRO: ABNORMAL /HPF (ref 0–4)

## 2022-05-12 PROCEDURE — 99285 EMERGENCY DEPT VISIT HI MDM: CPT

## 2022-05-12 PROCEDURE — 74011250637 HC RX REV CODE- 250/637: Performed by: EMERGENCY MEDICINE

## 2022-05-12 PROCEDURE — 87077 CULTURE AEROBIC IDENTIFY: CPT

## 2022-05-12 PROCEDURE — 81001 URINALYSIS AUTO W/SCOPE: CPT

## 2022-05-12 PROCEDURE — 85610 PROTHROMBIN TIME: CPT

## 2022-05-12 PROCEDURE — 87086 URINE CULTURE/COLONY COUNT: CPT

## 2022-05-12 PROCEDURE — 87636 SARSCOV2 & INF A&B AMP PRB: CPT

## 2022-05-12 PROCEDURE — 71045 X-RAY EXAM CHEST 1 VIEW: CPT

## 2022-05-12 PROCEDURE — 96375 TX/PRO/DX INJ NEW DRUG ADDON: CPT

## 2022-05-12 PROCEDURE — 74011000258 HC RX REV CODE- 258: Performed by: EMERGENCY MEDICINE

## 2022-05-12 PROCEDURE — 83605 ASSAY OF LACTIC ACID: CPT

## 2022-05-12 PROCEDURE — 83880 ASSAY OF NATRIURETIC PEPTIDE: CPT

## 2022-05-12 PROCEDURE — 74011250636 HC RX REV CODE- 250/636: Performed by: EMERGENCY MEDICINE

## 2022-05-12 PROCEDURE — 85025 COMPLETE CBC W/AUTO DIFF WBC: CPT

## 2022-05-12 PROCEDURE — 87040 BLOOD CULTURE FOR BACTERIA: CPT

## 2022-05-12 PROCEDURE — 36415 COLL VENOUS BLD VENIPUNCTURE: CPT

## 2022-05-12 PROCEDURE — 80053 COMPREHEN METABOLIC PANEL: CPT

## 2022-05-12 PROCEDURE — 93005 ELECTROCARDIOGRAM TRACING: CPT

## 2022-05-12 PROCEDURE — 84484 ASSAY OF TROPONIN QUANT: CPT

## 2022-05-12 PROCEDURE — 85730 THROMBOPLASTIN TIME PARTIAL: CPT

## 2022-05-12 PROCEDURE — 96365 THER/PROPH/DIAG IV INF INIT: CPT

## 2022-05-12 PROCEDURE — 74011000250 HC RX REV CODE- 250: Performed by: EMERGENCY MEDICINE

## 2022-05-12 RX ORDER — FUROSEMIDE 20 MG/1
20 TABLET ORAL DAILY
Qty: 30 TABLET | Refills: 0 | Status: SHIPPED | OUTPATIENT
Start: 2022-05-12

## 2022-05-12 RX ORDER — DILTIAZEM HYDROCHLORIDE 5 MG/ML
10 INJECTION INTRAVENOUS
Status: COMPLETED | OUTPATIENT
Start: 2022-05-12 | End: 2022-05-12

## 2022-05-12 RX ORDER — FUROSEMIDE 10 MG/ML
40 INJECTION INTRAMUSCULAR; INTRAVENOUS
Status: COMPLETED | OUTPATIENT
Start: 2022-05-12 | End: 2022-05-12

## 2022-05-12 RX ORDER — SODIUM CHLORIDE 0.9 % (FLUSH) 0.9 %
5-10 SYRINGE (ML) INJECTION AS NEEDED
Status: DISCONTINUED | OUTPATIENT
Start: 2022-05-12 | End: 2022-05-12 | Stop reason: HOSPADM

## 2022-05-12 RX ORDER — METOPROLOL TARTRATE 25 MG/1
25 TABLET, FILM COATED ORAL
Status: COMPLETED | OUTPATIENT
Start: 2022-05-12 | End: 2022-05-12

## 2022-05-12 RX ADMIN — FUROSEMIDE 40 MG: 10 INJECTION, SOLUTION INTRAMUSCULAR; INTRAVENOUS at 05:33

## 2022-05-12 RX ADMIN — CEFTRIAXONE SODIUM 1 G: 1 INJECTION, POWDER, FOR SOLUTION INTRAMUSCULAR; INTRAVENOUS at 05:03

## 2022-05-12 RX ADMIN — DILTIAZEM HYDROCHLORIDE 10 MG: 5 INJECTION INTRAVENOUS at 06:30

## 2022-05-12 RX ADMIN — METOPROLOL TARTRATE 25 MG: 25 TABLET, FILM COATED ORAL at 08:37

## 2022-05-12 NOTE — ED PROVIDER NOTES
EMERGENCY DEPARTMENT HISTORY AND PHYSICAL EXAM      Date: 5/12/2022  Patient Name: Pat Monson      Diagnosis     Clinical Impression:   1. Acute pulmonary edema (HCC)    2. Shortness of breath    3. Longstanding persistent atrial fibrillation ([address]              History of Presenting Illness     Chief Complaint   Patient presents with    Shortness of Breath       History Provided By: Patient and Patient's     HPI:   The history is provided by the patient and the spouse. Shortness of Breath  This is a new problem. The current episode started 3 to 5 hours ago. The problem has been gradually worsening. Associated symptoms include cough and leg swelling. Pertinent negatives include no fever, no headaches, no coryza, no rhinorrhea, no sore throat, no ear pain, no sputum production, no hemoptysis, no wheezing, no chest pain, no vomiting, no abdominal pain, no rash and no leg pain. She has tried nothing for the symptoms. The treatment provided no relief. She has had prior hospitalizations. She has had prior ED visits. Associated medical issues include PE. Associated medical issues do not include asthma, COPD or heart failure. Pat Monson, 74 y.o. female  presents to the ED with cc of shortness of breath.  reports she has been short of breath for the last several hours. Be having difficulty breathing so he brought her in. She has a history of chronic atrial fibrillation history of CVA with residual right-sided weakness, chronically on Eliquis. She is on metoprolol for rate control. She does not report any history of asthma COPD CHF.  reports she has been coughing for the last several days has been given Mucinex. Tonight symptoms became worse he was concerned and brought her in. She does not use home oxygen. She denies any fevers chills or respiratory symptoms. She denies any chest pain abdominal pain nausea vomiting diarrhea.   She reports chronic lower extremity swelling which is unchanged.  reports he was short of breath walking to the bathroom tonight and he also reports she was short of breath with lying flat. There are no other complaints, changes, or physical findings at this time. PCP: None    No current facility-administered medications on file prior to encounter. Current Outpatient Medications on File Prior to Encounter   Medication Sig Dispense Refill    atorvastatin (LIPITOR) 40 mg tablet Take 1 Tablet by mouth daily. 30 Tablet 0    hydroCHLOROthiazide (HYDRODIURIL) 12.5 mg tablet Take 1 Tablet by mouth daily. 30 Tablet 0    amLODIPine (NORVASC) 5 mg tablet Take 1 Tablet by mouth daily. 30 Tablet 0    lisinopriL (PRINIVIL, ZESTRIL) 40 mg tablet Take 1 Tablet by mouth daily. 30 Tablet 0    ticagrelor (BRILINTA) 60 mg tab tablet Take 0.5 Tablets by mouth every twelve (12) hours every twelve (12) hours. 60 Tablet 2    amoxicillin-clavulanate (Augmentin) 875-125 mg per tablet Take 1 Tablet by mouth two (2) times a day. 20 Tablet 0    aspirin delayed-release 81 mg tablet Take 81 mg by mouth daily.  cholecalciferol (VITAMIN D3) (1000 Units /25 mcg) tablet Take 1,000 Units by mouth daily.  therapeutic multivitamin (THERAGRAN) tablet Take 1 Tablet by mouth daily.  B.infantis-B.ani-B.long-B.bifi (Probiotic 4X) 10-15 mg TbEC Take  by mouth daily. Past History     Past Medical History:  No past medical history on file. Past Surgical History:  No past surgical history on file. Family History:  No family history on file. Social History:  Social History     Tobacco Use    Smoking status: Not on file    Smokeless tobacco: Not on file   Substance Use Topics    Alcohol use: Not on file    Drug use: Not on file       Allergies:  No Known Allergies      Review of Systems   Review of Systems   Constitutional: Negative. Negative for chills and fever. HENT: Negative. Negative for congestion, ear pain, rhinorrhea and sore throat. Eyes: Negative. Negative for discharge and redness. Respiratory: Positive for cough and shortness of breath. Negative for hemoptysis, sputum production and wheezing. Cardiovascular: Positive for leg swelling. Negative for chest pain and palpitations. Gastrointestinal: Negative. Negative for abdominal pain, nausea and vomiting. Endocrine: Negative. Negative for polydipsia and polyuria. Genitourinary: Negative. Negative for dysuria, flank pain and frequency. Musculoskeletal: Negative. Negative for arthralgias and back pain. Skin: Negative. Negative for rash and wound. Allergic/Immunologic: Negative. Neurological: Negative. Negative for dizziness and headaches. Hematological: Negative. Negative for adenopathy. Does not bruise/bleed easily. Psychiatric/Behavioral: Negative. Negative for confusion. The patient is not nervous/anxious. All other systems reviewed and are negative. Physical Exam   Physical Exam  Vitals and nursing note reviewed. Constitutional:       General: She is in acute distress. Appearance: Normal appearance. She is well-developed. She is not ill-appearing, toxic-appearing or diaphoretic. Comments: Female lying in bed in mild respiratory distress, no stridor   HENT:      Head: Normocephalic and atraumatic. Nose: Nose normal.      Mouth/Throat:      Mouth: Mucous membranes are moist.      Pharynx: Oropharynx is clear. Eyes:      Extraocular Movements: Extraocular movements intact. Conjunctiva/sclera: Conjunctivae normal.      Pupils: Pupils are equal, round, and reactive to light. Cardiovascular:      Rate and Rhythm: Tachycardia present. Rhythm irregular. Pulses: Normal pulses. Heart sounds: Normal heart sounds. No murmur heard. Pulmonary:      Effort: Respiratory distress present. Breath sounds: Rales present. No wheezing. Abdominal:      General: There is no distension. Palpations: Abdomen is soft.  There is no mass. Tenderness: There is no abdominal tenderness. There is no guarding or rebound. Musculoskeletal:         General: No swelling or tenderness. Normal range of motion. Cervical back: Normal range of motion and neck supple. No rigidity or tenderness. No muscular tenderness. Right lower leg: Edema present. Left lower leg: Edema present. Skin:     General: Skin is warm and dry. Capillary Refill: Capillary refill takes less than 2 seconds. Findings: No erythema or rash. Neurological:      Mental Status: She is alert and oriented to person, place, and time. Mental status is at baseline. Cranial Nerves: No cranial nerve deficit. Sensory: No sensory deficit. Motor: Weakness present.       Comments: Dysarthria with right-sided weakness, no paralysis   Psychiatric:         Mood and Affect: Mood normal.         Behavior: Behavior normal.         Diagnostic Study Results     Labs -     Recent Results (from the past 12 hour(s))   EKG, 12 LEAD, INITIAL    Collection Time: 05/12/22  4:47 AM   Result Value Ref Range    Ventricular Rate 118 BPM    Atrial Rate 92 BPM    QRS Duration 72 ms    Q-T Interval 338 ms    QTC Calculation (Bezet) 473 ms    Calculated R Axis -20 degrees    Calculated T Axis 46 degrees    Diagnosis       Undetermined rhythm  Low voltage QRS  Cannot rule out Anterior infarct , age undetermined  Abnormal ECG  When compared with ECG of 15-OCT-2021 11:43,  Current undetermined rhythm precludes rhythm comparison, needs review  QRS duration has decreased  Minimal criteria for Anterior infarct are now present  Nonspecific T wave abnormality now evident in Anterior leads     COVID-19 WITH INFLUENZA A/B    Collection Time: 05/12/22  4:53 AM   Result Value Ref Range    SARS-CoV-2 by PCR Not detected NOTD      Influenza A by PCR Not detected NOTD      Influenza B by PCR Not detected NOTD     NT-PRO BNP    Collection Time: 05/12/22  4:56 AM   Result Value Ref Range NT pro-BNP 2,871 (H) 0 - 125 PG/ML   LACTIC ACID    Collection Time: 05/12/22  4:58 AM   Result Value Ref Range    Lactic acid 1.7 0.4 - 2.0 MMOL/L   METABOLIC PANEL, COMPREHENSIVE    Collection Time: 05/12/22  4:58 AM   Result Value Ref Range    Sodium 146 (H) 136 - 145 mmol/L    Potassium 4.3 3.5 - 5.1 mmol/L    Chloride 109 (H) 97 - 108 mmol/L    CO2 27 21 - 32 mmol/L    Anion gap 10 5 - 15 mmol/L    Glucose 136 (H) 65 - 100 mg/dL    BUN 23 (H) 6 - 20 MG/DL    Creatinine 0.85 0.55 - 1.02 MG/DL    BUN/Creatinine ratio 27 (H) 12 - 20      GFR est AA >60 >60 ml/min/1.73m2    GFR est non-AA >60 >60 ml/min/1.73m2    Calcium 9.0 8.5 - 10.1 MG/DL    Bilirubin, total 0.6 0.2 - 1.0 MG/DL    ALT (SGPT) 23 12 - 78 U/L    AST (SGOT) 20 15 - 37 U/L    Alk. phosphatase 87 45 - 117 U/L    Protein, total 6.8 6.4 - 8.2 g/dL    Albumin 3.4 (L) 3.5 - 5.0 g/dL    Globulin 3.4 2.0 - 4.0 g/dL    A-G Ratio 1.0 (L) 1.1 - 2.2     CBC WITH AUTOMATED DIFF    Collection Time: 05/12/22  4:58 AM   Result Value Ref Range    WBC 9.8 3.6 - 11.0 K/uL    RBC 3.75 (L) 3.80 - 5.20 M/uL    HGB 10.7 (L) 11.5 - 16.0 g/dL    HCT 34.7 (L) 35.0 - 47.0 %    MCV 92.5 80.0 - 99.0 FL    MCH 28.5 26.0 - 34.0 PG    MCHC 30.8 30.0 - 36.5 g/dL    RDW 17.4 (H) 11.5 - 14.5 %    PLATELET 889 507 - 564 K/uL    MPV 10.2 8.9 - 12.9 FL    NRBC 0.0 0  WBC    ABSOLUTE NRBC 0.00 0.00 - 0.01 K/uL    NEUTROPHILS 60 32 - 75 %    LYMPHOCYTES 31 12 - 49 %    MONOCYTES 7 5 - 13 %    EOSINOPHILS 1 0 - 7 %    BASOPHILS 1 0 - 1 %    IMMATURE GRANULOCYTES 0 0.0 - 0.5 %    ABS. NEUTROPHILS 5.9 1.8 - 8.0 K/UL    ABS. LYMPHOCYTES 3.0 0.8 - 3.5 K/UL    ABS. MONOCYTES 0.7 0.0 - 1.0 K/UL    ABS. EOSINOPHILS 0.1 0.0 - 0.4 K/UL    ABS. BASOPHILS 0.1 0.0 - 0.1 K/UL    ABS. IMM.  GRANS. 0.0 0.00 - 0.04 K/UL    DF AUTOMATED     TROPONIN-HIGH SENSITIVITY    Collection Time: 05/12/22  4:58 AM   Result Value Ref Range    Troponin-High Sensitivity 9 0 - 51 ng/L   PTT    Collection Time: 05/12/22  4:58 AM   Result Value Ref Range    aPTT 27.1 22.1 - 31.0 sec    aPTT, therapeutic range     58.0 - 77.0 SECS   PROTHROMBIN TIME + INR    Collection Time: 05/12/22  4:58 AM   Result Value Ref Range    INR 1.1 0.9 - 1.1      Prothrombin time 11.4 (H) 9.0 - 11.1 sec   URINALYSIS W/ REFLEX CULTURE    Collection Time: 05/12/22  6:04 AM    Specimen: Urine   Result Value Ref Range    Color YELLOW/STRAW      Appearance CLEAR CLEAR      Specific gravity 1.015 1.003 - 1.030      pH (UA) 6.5 5.0 - 8.0      Protein Negative NEG mg/dL    Glucose Negative NEG mg/dL    Ketone Negative NEG mg/dL    Bilirubin Negative NEG      Blood Negative NEG      Urobilinogen 0.2 0.2 - 1.0 EU/dL    Nitrites Negative NEG      Leukocyte Esterase LARGE (A) NEG      WBC 20-50 0 - 4 /hpf    RBC 0-5 0 - 5 /hpf    Epithelial cells FEW FEW /lpf    Bacteria 1+ (A) NEG /hpf    UA:UC IF INDICATED URINE CULTURE ORDERED (A) CNI         Radiologic Studies -   XR CHEST PORT   Final Result   Mild pulmonary edema with bibasilar atelectasis and small bilateral   pleural effusions. CT Results  (Last 48 hours)    None        CXR Results  (Last 48 hours)               05/12/22 0458  XR CHEST PORT Final result    Impression:  Mild pulmonary edema with bibasilar atelectasis and small bilateral   pleural effusions. Narrative:  INDICATION: Shortness of breath       COMPARISON: 10/18/2021       FINDINGS: AP portable imaging of the chest performed at 4:48 AM demonstrates a   stable cardiomediastinal silhouette. There is mild pulmonary edema with basilar   atelectasis and small bilateral pleural effusions. Degenerative changes are   present in the thoracic spine. Medical Decision Making   I am the first provider for this patient. I reviewed the vital signs, available nursing notes, past medical history, past surgical history, family history and social history. Vital Signs-Reviewed the patient's vital signs.   Patient Vitals for the past 12 hrs:   Temp Pulse Resp BP SpO2   05/12/22 0655  100 17  93 %   05/12/22 0652  88 18  94 %   05/12/22 0647  93 20  96 %   05/12/22 0646  (!) 115 20 (!) 165/92 96 %   05/12/22 0628  95 21  96 %   05/12/22 0558  99 21  97 %   05/12/22 0540  85 22  94 %   05/12/22 0535  (!) 106 18  96 %   05/12/22 0531  (!) 113 20 137/68 96 %   05/12/22 0516  (!) 109 19 (!) 149/97 96 %   05/12/22 0513  96 25  95 %   05/12/22 0501  95 24 137/83 95 %   05/12/22 0458  99 24 (!) 134/94 96 %   05/12/22 0444 97.7 °F (36.5 °C)       05/12/22 0441  (!) 124 25 (!) 158/104 95 %           EKG interpretation: (Preliminary)  Rhythm: atrial fib;  irregular. Rate (approx.): 118; Blocks: none; Ectopy: noneAxis: normal; P wave: ; QRS interval: normal ; ST/T wave: non-specific changes; in  Lead: ; Other findings:     EKG interpretation: (Preliminary)  Rhythm: normal sinus rhythm; and regular . Rate (approx.): 81; Blocks: none; Ectopy: PVCs; Axis: normal; P wave: normal; QRS interval: normal ; ST/T wave: non-specific changes; in  Lead: ; Other findings: prolonged QT. This EKG was interpreted by Yahaira Montgomery MD ,ED Provider. .        Records Reviewed: Nursing Notes, Old Medical Records, Previous electrocardiograms, Previous Radiology Studies and Previous Laboratory Studies    Provider Notes (Medical Decision Making):   Patient presents with report of shortness of breath with increased work of breathing tonight  was concerned and brought her in. She initially appeared to be in atrial fibrillation which is a chronic history on Eliquis, repeat EKG reveals normal sinus rhythm. Will check chest x-ray laboratory studies to assess etiology of shortness of breath. We will do COVID-19 test.    ED Course:   Initial assessment performed. The patients presenting problems have been discussed, and they are in agreement with the care plan formulated and outlined with them.   I have encouraged them to ask questions as they arise throughout their visit. ED Course as of 05/12/22 0707   Thu May 12, 2022   0614 Updated patient and family on lab results, patient's heart rate is intermittently elevated A. fib on the monitor, will give her dose of Cardizem to reevaluate heart rate. Family and patient would like to be discharged on diuretics to follow-up with her cardiologist in [facility]. Recommend admission, risk and benefits of going home discussed and need for close follow-up, symptoms to watch for were discussed. Patient family understands and wishes to be discharged over being admitted to the hospital []   [phone] with patient's cardiologist Dr. Mindi Mccormack, reviewed treatment he is agreeable with IV Lasix here and if she is improved heart rate is controlled less than 100 she can be discharged with continuation of her current meds add Lasix 20 mg daily and he was seen in the office for follow-up []   1436 Updated patient and family on discussion with cardiologist, will check a second troponin and ensure that she has rate control and plan discharge with Lasix. []      ED Course User Index  [] Roel Smith MD         Medications Given in the ED:    Medications   sodium chloride (NS) flush 5-10 mL (has no administration in time range)   cefTRIAXone (ROCEPHIN) 1 g in 0.9% sodium chloride (MBP/ADV) 50 mL MBP (0 g IntraVENous IV Completed 5/12/22 0533)   furosemide (LASIX) injection 40 mg (40 mg IntraVENous Given 5/12/22 0533)   dilTIAZem (CARDIZEM) injection 10 mg (10 mg IntraVENous Given 5/12/22 0630)           6:59 AM    Patient presents complaining of shortness of breath, she was COVID-negative chest x-ray showed some mild pulmonary edema. She improved with Lasix she was not hypoxic, she was borderline on rate control with her A. fib she is currently anticoagulated and takes metoprolol past, she was given a dose of Cardizem for rate control.   Patient and family do not want to be admitted, discussed findings with patient's cardiologist, he will see patient for follow-up he agrees with starting on Lasix. Waiting for second troponin and reassessment of her heart rate if these are both acceptable she will be discharged to see her cardiologist for follow-up    Pt has been re-examined and states that they are feeling better and have no new complaints. Laboratory tests, medications, x-rays, diagnosis, follow up plan and return instructions have been reviewed and discussed with the patient and/or family. Pt and/or family were instructed on symptoms that may arise after discharge requiring re-evaluation by a physician. Pt and/or family have had the opportunity to ask questions about their care. Patient and/or family verbalized understanding and agreement with care plan, follow up and return instructions. Patient and/or family agree to return in 50 hours if their symptoms are not improving or immediately if they have any change in their condition. I have also put together some discharge instructions for patient that include: 1) educational information regarding their diagnosis, 2) how to care for their diagnosis at home, as well a 3) list of reasons why they would want to return to the ED prior to their follow-up appointment, should their condition change. Camila Cedeno MD        7:00 AM  Care to Dr. María Randall, reassess heart rate and check second troponin. Camila Cedeno MD        Procedures        Disposition:  Plan discharge home pending troponin and reassessment of heart rate        DISCHARGE PLAN:  1. Current Discharge Medication List      START taking these medications    Details   furosemide (Lasix) 20 mg tablet Take 1 Tablet by mouth daily. Qty: 30 Tablet, Refills: 0  Start date: 5/12/2022           2.    Follow-up Information     Follow up With Specialties Details Why Contact Sixot Berkowitz MD Cardiovascular Disease Physician Schedule an appointment as soon as possible for a visit in 1 day For follow up [address] [facility] [address]  [phone]          3. Return to ED if worse       Attestations: Kortney Armstrong MD    Please note that this dictation was completed with [employer], the computer voice recognition software. Quite often unanticipated grammatical, syntax, homophones, and other interpretive errors are inadvertently transcribed by the computer software. Please disregard these errors. Please excuse any errors that have escaped final proofreading. Thank you.

## 2022-05-12 NOTE — ED PROVIDER NOTES
Patient was signed out to me by Dr. Ashish Rosa with repeat troponin pending. Repeat troponin is negative. Reassessed patient and she is in no acute distress, breathing comfortably maintaining adequate oxygen saturations. At the time my assessment heart rate was 90 to 100 bpm on the monitor, remains in atrial fibrillation. Given patient dose of her morning metoprolol. Otherwise patient is feeling well at this time and again requesting discharge. 8:28 AM  Michaelanandini Carey's  results have been reviewed with her. She has been counseled regarding her diagnosis. She verbally conveys understanding and agreement of the signs, symptoms, diagnosis, treatment and prognosis and additionally agrees to follow up as recommended. She also agrees with the care-plan and conveys that all of her questions have been answered. I have also put together some discharge instructions for her that include: 1) educational information regarding their diagnosis, 2) how to care for their diagnosis at home, as well a 3) list of reasons why they would want to return to the ED prior to their follow-up appointment, should their condition change.       Lelo Samano MD

## 2022-05-12 NOTE — ED TRIAGE NOTES
Pt arrived POV with c/o SOB that woke her up in the middle of the night.  states he has been giving her mucinex the past several days for a cough.

## 2022-05-13 LAB
BACTERIA SPEC CULT: NORMAL
CC UR VC: NORMAL
SERVICE CMNT-IMP: NORMAL

## 2022-05-15 LAB
ATRIAL RATE: 92 BPM
CALCULATED R AXIS, ECG10: -20 DEGREES
CALCULATED T AXIS, ECG11: 46 DEGREES
DIAGNOSIS, 93000: NORMAL
Q-T INTERVAL, ECG07: 338 MS
QRS DURATION, ECG06: 72 MS
QTC CALCULATION (BEZET), ECG08: 473 MS
VENTRICULAR RATE, ECG03: 118 BPM

## 2022-05-18 LAB
BACTERIA SPEC CULT: NORMAL
SERVICE CMNT-IMP: NORMAL

## 2022-05-27 LAB
BACTERIA SPEC CULT: ABNORMAL
SERVICE CMNT-IMP: ABNORMAL

## 2022-05-28 NOTE — PROGRESS NOTES
Patient was seen 16 days ago and had these cultures drawn. There was initially no growth and now there is growth in one of the bottles of staph negative which is likely contaminant. Attempted to call patient but number is not in service. She does not have a PCP listed. No way to contact her.   Jaycee Peters MD